# Patient Record
Sex: FEMALE | Race: WHITE | NOT HISPANIC OR LATINO | Employment: FULL TIME
[De-identification: names, ages, dates, MRNs, and addresses within clinical notes are randomized per-mention and may not be internally consistent; named-entity substitution may affect disease eponyms.]

---

## 2017-09-03 ENCOUNTER — HEALTH MAINTENANCE LETTER (OUTPATIENT)
Age: 32
End: 2017-09-03

## 2019-01-16 ENCOUNTER — COMMUNICATION - HEALTHEAST (OUTPATIENT)
Dept: SURGERY | Facility: CLINIC | Age: 34
End: 2019-01-16

## 2019-02-01 ENCOUNTER — AMBULATORY - HEALTHEAST (OUTPATIENT)
Dept: SURGERY | Facility: CLINIC | Age: 34
End: 2019-02-01

## 2019-02-04 ENCOUNTER — OFFICE VISIT - HEALTHEAST (OUTPATIENT)
Dept: SURGERY | Facility: CLINIC | Age: 34
End: 2019-02-04

## 2019-02-04 DIAGNOSIS — E66.01 OBESITY, CLASS III, BMI 40-49.9 (MORBID OBESITY) (H): ICD-10-CM

## 2019-02-04 ASSESSMENT — MIFFLIN-ST. JEOR: SCORE: 1969.45

## 2019-02-22 ENCOUNTER — AMBULATORY - HEALTHEAST (OUTPATIENT)
Dept: LAB | Facility: CLINIC | Age: 34
End: 2019-02-22

## 2019-02-22 ENCOUNTER — OFFICE VISIT - HEALTHEAST (OUTPATIENT)
Dept: SURGERY | Facility: CLINIC | Age: 34
End: 2019-02-22

## 2019-02-22 DIAGNOSIS — G89.29 OTHER CHRONIC PAIN: ICD-10-CM

## 2019-02-22 DIAGNOSIS — Z71.3 NUTRITIONAL COUNSELING: ICD-10-CM

## 2019-02-22 DIAGNOSIS — E66.01 OBESITY, CLASS III, BMI 40-49.9 (MORBID OBESITY) (H): ICD-10-CM

## 2019-02-22 DIAGNOSIS — E66.01 OBESITY, MORBID, BMI 40.0-49.9 (H): ICD-10-CM

## 2019-02-22 LAB
25(OH)D3 SERPL-MCNC: 34.7 NG/ML (ref 30–80)
ALBUMIN SERPL-MCNC: 3.9 G/DL (ref 3.5–5)
ALP SERPL-CCNC: 61 U/L (ref 45–120)
ALT SERPL W P-5'-P-CCNC: 12 U/L (ref 0–45)
ANION GAP SERPL CALCULATED.3IONS-SCNC: 7 MMOL/L (ref 5–18)
AST SERPL W P-5'-P-CCNC: 15 U/L (ref 0–40)
BILIRUB SERPL-MCNC: 0.6 MG/DL (ref 0–1)
BUN SERPL-MCNC: 11 MG/DL (ref 8–22)
CALCIUM SERPL-MCNC: 9.1 MG/DL (ref 8.5–10.5)
CHLORIDE BLD-SCNC: 107 MMOL/L (ref 98–107)
CO2 SERPL-SCNC: 22 MMOL/L (ref 22–31)
CREAT SERPL-MCNC: 0.79 MG/DL (ref 0.6–1.1)
ERYTHROCYTE [DISTWIDTH] IN BLOOD BY AUTOMATED COUNT: 12.6 % (ref 11–14.5)
FERRITIN SERPL-MCNC: 8 NG/ML (ref 10–130)
FOLATE SERPL-MCNC: 8.1 NG/ML
GFR SERPL CREATININE-BSD FRML MDRD: >60 ML/MIN/1.73M2
GLUCOSE BLD-MCNC: 89 MG/DL (ref 70–125)
HBA1C MFR BLD: 5.3 % (ref 4.2–6.1)
HCT VFR BLD AUTO: 38.3 % (ref 35–47)
HGB BLD-MCNC: 11.9 G/DL (ref 12–16)
MAGNESIUM SERPL-MCNC: 2.1 MG/DL (ref 1.8–2.6)
MCH RBC QN AUTO: 26.7 PG (ref 27–34)
MCHC RBC AUTO-ENTMCNC: 31.1 G/DL (ref 32–36)
MCV RBC AUTO: 86 FL (ref 80–100)
PLATELET # BLD AUTO: 310 THOU/UL (ref 140–440)
PMV BLD AUTO: 9.5 FL (ref 8.5–12.5)
POTASSIUM BLD-SCNC: 4.3 MMOL/L (ref 3.5–5)
PROT SERPL-MCNC: 6.9 G/DL (ref 6–8)
PTH-INTACT SERPL-MCNC: 60 PG/ML (ref 10–86)
RBC # BLD AUTO: 4.45 MILL/UL (ref 3.8–5.4)
SODIUM SERPL-SCNC: 136 MMOL/L (ref 136–145)
TSH SERPL DL<=0.005 MIU/L-ACNC: 2.94 UIU/ML (ref 0.3–5)
VIT B12 SERPL-MCNC: 330 PG/ML (ref 213–816)
WBC: 5.5 THOU/UL (ref 4–11)

## 2019-02-22 ASSESSMENT — MIFFLIN-ST. JEOR: SCORE: 1949.6

## 2019-02-24 LAB — VIT B1 PYROPHOSHATE BLD-SCNC: 81 NMOL/L (ref 70–180)

## 2019-02-25 LAB
ANNOTATION COMMENT IMP: NORMAL
VIT A SERPL-MCNC: 0.79 MG/L (ref 0.3–1.2)
VITAMIN A (RETINYL PALMITATE): 0.02 MG/L (ref 0–0.1)

## 2019-02-26 ENCOUNTER — COMMUNICATION - HEALTHEAST (OUTPATIENT)
Dept: SURGERY | Facility: CLINIC | Age: 34
End: 2019-02-26

## 2019-02-26 LAB
COPPER SERPL-MCNC: 97 UG/DL (ref 80–155)
ZINC SERPL-MCNC: 79 UG/DL (ref 60–120)

## 2019-02-28 ENCOUNTER — COMMUNICATION - HEALTHEAST (OUTPATIENT)
Dept: SURGERY | Facility: CLINIC | Age: 34
End: 2019-02-28

## 2019-02-28 ENCOUNTER — AMBULATORY - HEALTHEAST (OUTPATIENT)
Dept: SURGERY | Facility: CLINIC | Age: 34
End: 2019-02-28

## 2019-02-28 DIAGNOSIS — R79.0 LOW FERRITIN: ICD-10-CM

## 2019-03-05 ENCOUNTER — OFFICE VISIT - HEALTHEAST (OUTPATIENT)
Dept: SURGERY | Facility: CLINIC | Age: 34
End: 2019-03-05

## 2019-03-05 DIAGNOSIS — E66.01 MORBID OBESITY WITH BMI OF 40.0-44.9, ADULT (H): ICD-10-CM

## 2019-03-15 ENCOUNTER — OFFICE VISIT - HEALTHEAST (OUTPATIENT)
Dept: SURGERY | Facility: CLINIC | Age: 34
End: 2019-03-15

## 2019-03-15 DIAGNOSIS — E66.01 OBESITY, MORBID, BMI 40.0-49.9 (H): ICD-10-CM

## 2019-03-15 DIAGNOSIS — G89.29 OTHER CHRONIC PAIN: ICD-10-CM

## 2019-03-15 DIAGNOSIS — Z71.3 NUTRITIONAL COUNSELING: ICD-10-CM

## 2019-03-15 ASSESSMENT — MIFFLIN-ST. JEOR: SCORE: 1954.14

## 2019-03-20 ENCOUNTER — COMMUNICATION - HEALTHEAST (OUTPATIENT)
Dept: SURGERY | Facility: CLINIC | Age: 34
End: 2019-03-20

## 2019-03-21 ENCOUNTER — OFFICE VISIT - HEALTHEAST (OUTPATIENT)
Dept: SURGERY | Facility: CLINIC | Age: 34
End: 2019-03-21

## 2019-03-21 DIAGNOSIS — E66.01 MORBID OBESITY WITH BMI OF 40.0-44.9, ADULT (H): ICD-10-CM

## 2019-04-08 ENCOUNTER — OFFICE VISIT - HEALTHEAST (OUTPATIENT)
Dept: SURGERY | Facility: CLINIC | Age: 34
End: 2019-04-08

## 2019-04-08 DIAGNOSIS — E66.01 MORBID OBESITY WITH BMI OF 40.0-44.9, ADULT (H): ICD-10-CM

## 2019-04-08 ASSESSMENT — MIFFLIN-ST. JEOR: SCORE: 1972.28

## 2019-04-09 ENCOUNTER — AMBULATORY - HEALTHEAST (OUTPATIENT)
Dept: SURGERY | Facility: CLINIC | Age: 34
End: 2019-04-09

## 2019-04-15 ENCOUNTER — RECORDS - HEALTHEAST (OUTPATIENT)
Dept: ADMINISTRATIVE | Facility: OTHER | Age: 34
End: 2019-04-15

## 2019-04-22 ENCOUNTER — AMBULATORY - HEALTHEAST (OUTPATIENT)
Dept: SURGERY | Facility: CLINIC | Age: 34
End: 2019-04-22

## 2019-04-22 DIAGNOSIS — K21.9 ACID REFLUX: ICD-10-CM

## 2019-04-22 DIAGNOSIS — Z01.818 PRE-OPERATIVE CLEARANCE: ICD-10-CM

## 2019-04-22 DIAGNOSIS — Z87.891 HISTORY OF SMOKING: ICD-10-CM

## 2019-04-22 DIAGNOSIS — R63.4 RAPID WEIGHT LOSS: ICD-10-CM

## 2019-04-22 DIAGNOSIS — Z98.84 S/P LAPAROSCOPIC SLEEVE GASTRECTOMY: ICD-10-CM

## 2019-04-29 ENCOUNTER — RECORDS - HEALTHEAST (OUTPATIENT)
Dept: ADMINISTRATIVE | Facility: OTHER | Age: 34
End: 2019-04-29

## 2019-05-01 ENCOUNTER — COMMUNICATION - HEALTHEAST (OUTPATIENT)
Dept: SURGERY | Facility: CLINIC | Age: 34
End: 2019-05-01

## 2019-05-06 ENCOUNTER — ANESTHESIA - HEALTHEAST (OUTPATIENT)
Dept: SURGERY | Facility: CLINIC | Age: 34
End: 2019-05-06

## 2019-05-07 ENCOUNTER — SURGERY - HEALTHEAST (OUTPATIENT)
Dept: SURGERY | Facility: CLINIC | Age: 34
End: 2019-05-07

## 2019-05-07 ASSESSMENT — MIFFLIN-ST. JEOR
SCORE: 1943.71
SCORE: 1959.58

## 2019-05-10 ENCOUNTER — COMMUNICATION - HEALTHEAST (OUTPATIENT)
Dept: SURGERY | Facility: CLINIC | Age: 34
End: 2019-05-10

## 2019-05-10 DIAGNOSIS — K22.4 ESOPHAGEAL SPASM: ICD-10-CM

## 2019-05-10 DIAGNOSIS — R10.13 EPIGASTRIC CRAMPING: ICD-10-CM

## 2019-05-10 DIAGNOSIS — Z98.84 S/P LAPAROSCOPIC SLEEVE GASTRECTOMY: ICD-10-CM

## 2019-05-13 ENCOUNTER — AMBULATORY - HEALTHEAST (OUTPATIENT)
Dept: SURGERY | Facility: CLINIC | Age: 34
End: 2019-05-13

## 2019-05-13 ENCOUNTER — COMMUNICATION - HEALTHEAST (OUTPATIENT)
Dept: SURGERY | Facility: CLINIC | Age: 34
End: 2019-05-13

## 2019-05-13 DIAGNOSIS — E66.01 OBESITY, MORBID, BMI 40.0-49.9 (H): ICD-10-CM

## 2019-05-13 DIAGNOSIS — Z71.3 NUTRITIONAL COUNSELING: ICD-10-CM

## 2019-05-13 DIAGNOSIS — Z98.84 BARIATRIC SURGERY STATUS: ICD-10-CM

## 2019-05-20 ENCOUNTER — OFFICE VISIT - HEALTHEAST (OUTPATIENT)
Dept: SURGERY | Facility: CLINIC | Age: 34
End: 2019-05-20

## 2019-05-20 DIAGNOSIS — Z48.89 POSTOPERATIVE VISIT: ICD-10-CM

## 2019-05-20 ASSESSMENT — MIFFLIN-ST. JEOR: SCORE: 1881.56

## 2019-05-21 ENCOUNTER — COMMUNICATION - HEALTHEAST (OUTPATIENT)
Dept: FAMILY MEDICINE | Facility: CLINIC | Age: 34
End: 2019-05-21

## 2019-05-21 ENCOUNTER — OFFICE VISIT - HEALTHEAST (OUTPATIENT)
Dept: FAMILY MEDICINE | Facility: CLINIC | Age: 34
End: 2019-05-21

## 2019-05-21 ENCOUNTER — COMMUNICATION - HEALTHEAST (OUTPATIENT)
Dept: SURGERY | Facility: CLINIC | Age: 34
End: 2019-05-21

## 2019-05-21 DIAGNOSIS — D64.9 ANEMIA, UNSPECIFIED TYPE: ICD-10-CM

## 2019-05-21 DIAGNOSIS — L05.01 PILONIDAL ABSCESS: ICD-10-CM

## 2019-05-21 ASSESSMENT — MIFFLIN-ST. JEOR: SCORE: 1901.75

## 2019-05-22 ENCOUNTER — OFFICE VISIT - HEALTHEAST (OUTPATIENT)
Dept: SURGERY | Facility: CLINIC | Age: 34
End: 2019-05-22

## 2019-05-22 DIAGNOSIS — K61.1 PERI-RECTAL ABSCESS: ICD-10-CM

## 2019-05-22 ASSESSMENT — MIFFLIN-ST. JEOR: SCORE: 1874.54

## 2019-05-23 ENCOUNTER — COMMUNICATION - HEALTHEAST (OUTPATIENT)
Dept: SURGERY | Facility: CLINIC | Age: 34
End: 2019-05-23

## 2019-05-23 DIAGNOSIS — K61.1 PERI-RECTAL ABSCESS: ICD-10-CM

## 2019-05-29 ENCOUNTER — OFFICE VISIT - HEALTHEAST (OUTPATIENT)
Dept: SURGERY | Facility: CLINIC | Age: 34
End: 2019-05-29

## 2019-05-29 ASSESSMENT — MIFFLIN-ST. JEOR: SCORE: 1866.82

## 2019-06-04 ENCOUNTER — COMMUNICATION - HEALTHEAST (OUTPATIENT)
Dept: SURGERY | Facility: CLINIC | Age: 34
End: 2019-06-04

## 2019-06-07 ENCOUNTER — OFFICE VISIT - HEALTHEAST (OUTPATIENT)
Dept: SURGERY | Facility: CLINIC | Age: 34
End: 2019-06-07

## 2019-06-07 ENCOUNTER — COMMUNICATION - HEALTHEAST (OUTPATIENT)
Dept: SURGERY | Facility: CLINIC | Age: 34
End: 2019-06-07

## 2019-06-07 DIAGNOSIS — K61.1 PERIRECTAL ABSCESS: ICD-10-CM

## 2019-06-07 ASSESSMENT — MIFFLIN-ST. JEOR: SCORE: 1856.85

## 2019-06-12 ENCOUNTER — COMMUNICATION - HEALTHEAST (OUTPATIENT)
Dept: SURGERY | Facility: CLINIC | Age: 34
End: 2019-06-12

## 2019-06-17 ENCOUNTER — COMMUNICATION - HEALTHEAST (OUTPATIENT)
Dept: SURGERY | Facility: CLINIC | Age: 34
End: 2019-06-17

## 2019-06-25 ENCOUNTER — COMMUNICATION - HEALTHEAST (OUTPATIENT)
Dept: SURGERY | Facility: CLINIC | Age: 34
End: 2019-06-25

## 2019-06-25 DIAGNOSIS — K61.1 PERIRECTAL ABSCESS: ICD-10-CM

## 2019-06-27 ENCOUNTER — COMMUNICATION - HEALTHEAST (OUTPATIENT)
Dept: SURGERY | Facility: CLINIC | Age: 34
End: 2019-06-27

## 2019-06-28 ENCOUNTER — OFFICE VISIT - HEALTHEAST (OUTPATIENT)
Dept: SURGERY | Facility: CLINIC | Age: 34
End: 2019-06-28

## 2019-06-28 DIAGNOSIS — K61.1 PERIRECTAL ABSCESS: ICD-10-CM

## 2019-06-28 ASSESSMENT — MIFFLIN-ST. JEOR
SCORE: 1826
SCORE: 1826

## 2019-07-01 ENCOUNTER — ANESTHESIA - HEALTHEAST (OUTPATIENT)
Dept: SURGERY | Facility: AMBULATORY SURGERY CENTER | Age: 34
End: 2019-07-01

## 2019-07-02 ENCOUNTER — SURGERY - HEALTHEAST (OUTPATIENT)
Dept: SURGERY | Facility: AMBULATORY SURGERY CENTER | Age: 34
End: 2019-07-02

## 2019-07-02 ASSESSMENT — MIFFLIN-ST. JEOR: SCORE: 1821.46

## 2019-07-05 ENCOUNTER — COMMUNICATION - HEALTHEAST (OUTPATIENT)
Dept: SURGERY | Facility: CLINIC | Age: 34
End: 2019-07-05

## 2019-07-17 ENCOUNTER — OFFICE VISIT - HEALTHEAST (OUTPATIENT)
Dept: SURGERY | Facility: CLINIC | Age: 34
End: 2019-07-17

## 2019-07-17 DIAGNOSIS — K60.30 ANAL FISTULA: ICD-10-CM

## 2019-07-18 ENCOUNTER — AMBULATORY - HEALTHEAST (OUTPATIENT)
Dept: SURGERY | Facility: CLINIC | Age: 34
End: 2019-07-18

## 2019-07-18 DIAGNOSIS — E66.9 OBESITY WITH BODY MASS INDEX OF 30.0-39.9: ICD-10-CM

## 2019-07-18 DIAGNOSIS — Z98.84 BARIATRIC SURGERY STATUS: ICD-10-CM

## 2019-07-18 DIAGNOSIS — Z71.3 NUTRITIONAL COUNSELING: ICD-10-CM

## 2019-07-26 ENCOUNTER — COMMUNICATION - HEALTHEAST (OUTPATIENT)
Dept: SURGERY | Facility: CLINIC | Age: 34
End: 2019-07-26

## 2019-07-31 ENCOUNTER — COMMUNICATION - HEALTHEAST (OUTPATIENT)
Dept: SURGERY | Facility: CLINIC | Age: 34
End: 2019-07-31

## 2019-08-13 ENCOUNTER — OFFICE VISIT - HEALTHEAST (OUTPATIENT)
Dept: SURGERY | Facility: CLINIC | Age: 34
End: 2019-08-13

## 2019-08-13 DIAGNOSIS — Z98.84 BARIATRIC SURGERY STATUS: ICD-10-CM

## 2019-08-13 DIAGNOSIS — Z71.3 NUTRITIONAL COUNSELING: ICD-10-CM

## 2019-08-13 DIAGNOSIS — E66.9 OBESITY WITH BODY MASS INDEX OF 30.0-39.9: ICD-10-CM

## 2019-08-13 ASSESSMENT — MIFFLIN-ST. JEOR: SCORE: 1794.7

## 2019-08-27 ENCOUNTER — RECORDS - HEALTHEAST (OUTPATIENT)
Dept: ADMINISTRATIVE | Facility: OTHER | Age: 34
End: 2019-08-27

## 2019-09-17 ENCOUNTER — COMMUNICATION - HEALTHEAST (OUTPATIENT)
Dept: SURGERY | Facility: CLINIC | Age: 34
End: 2019-09-17

## 2019-09-17 ENCOUNTER — RECORDS - HEALTHEAST (OUTPATIENT)
Dept: ADMINISTRATIVE | Facility: OTHER | Age: 34
End: 2019-09-17

## 2019-10-24 ENCOUNTER — COMMUNICATION - HEALTHEAST (OUTPATIENT)
Dept: SURGERY | Facility: CLINIC | Age: 34
End: 2019-10-24

## 2019-10-24 DIAGNOSIS — Z48.89 POSTOPERATIVE VISIT: ICD-10-CM

## 2019-10-24 DIAGNOSIS — K91.2 POSTSURGICAL MALABSORPTION: ICD-10-CM

## 2019-10-24 DIAGNOSIS — K90.9 INTESTINAL MALABSORPTION, UNSPECIFIED: ICD-10-CM

## 2019-10-24 DIAGNOSIS — Z98.84 S/P LAPAROSCOPIC SLEEVE GASTRECTOMY: ICD-10-CM

## 2019-10-25 ENCOUNTER — COMMUNICATION - HEALTHEAST (OUTPATIENT)
Dept: SCHEDULING | Facility: CLINIC | Age: 34
End: 2019-10-25

## 2019-10-25 DIAGNOSIS — Z98.84 S/P LAPAROSCOPIC SLEEVE GASTRECTOMY: ICD-10-CM

## 2019-10-25 DIAGNOSIS — R63.4 RAPID WEIGHT LOSS: ICD-10-CM

## 2019-10-31 ENCOUNTER — SURGERY - HEALTHEAST (OUTPATIENT)
Dept: SURGERY | Facility: HOSPITAL | Age: 34
End: 2019-10-31

## 2019-10-31 ENCOUNTER — ANESTHESIA - HEALTHEAST (OUTPATIENT)
Dept: SURGERY | Facility: HOSPITAL | Age: 34
End: 2019-10-31

## 2019-11-18 ENCOUNTER — RECORDS - HEALTHEAST (OUTPATIENT)
Dept: ADMINISTRATIVE | Facility: OTHER | Age: 34
End: 2019-11-18

## 2019-11-19 ENCOUNTER — HOSPITAL ENCOUNTER (OUTPATIENT)
Dept: CT IMAGING | Facility: CLINIC | Age: 34
Discharge: HOME | End: 2019-11-19

## 2019-11-19 DIAGNOSIS — K61.1 PERIRECTAL ABSCESS: ICD-10-CM

## 2019-11-21 ENCOUNTER — AMBULATORY - HEALTHEAST (OUTPATIENT)
Dept: LAB | Facility: CLINIC | Age: 34
End: 2019-11-21

## 2019-11-21 DIAGNOSIS — K90.9 INTESTINAL MALABSORPTION, UNSPECIFIED: ICD-10-CM

## 2019-11-21 DIAGNOSIS — K91.2 POSTSURGICAL MALABSORPTION: ICD-10-CM

## 2019-11-21 DIAGNOSIS — Z98.84 S/P LAPAROSCOPIC SLEEVE GASTRECTOMY: ICD-10-CM

## 2019-11-21 LAB
25(OH)D3 SERPL-MCNC: 42.9 NG/ML (ref 30–80)
ALBUMIN SERPL-MCNC: 3.6 G/DL (ref 3.5–5)
ALP SERPL-CCNC: 54 U/L (ref 45–120)
ALT SERPL W P-5'-P-CCNC: 14 U/L (ref 0–45)
ANION GAP SERPL CALCULATED.3IONS-SCNC: 8 MMOL/L (ref 5–18)
AST SERPL W P-5'-P-CCNC: 21 U/L (ref 0–40)
BILIRUB SERPL-MCNC: 0.9 MG/DL (ref 0–1)
BUN SERPL-MCNC: 11 MG/DL (ref 8–22)
CALCIUM SERPL-MCNC: 8.8 MG/DL (ref 8.5–10.5)
CHLORIDE BLD-SCNC: 105 MMOL/L (ref 98–107)
CHOLEST SERPL-MCNC: 161 MG/DL
CO2 SERPL-SCNC: 24 MMOL/L (ref 22–31)
CREAT SERPL-MCNC: 0.71 MG/DL (ref 0.6–1.1)
ERYTHROCYTE [DISTWIDTH] IN BLOOD BY AUTOMATED COUNT: 12.1 % (ref 11–14.5)
FASTING STATUS PATIENT QL REPORTED: YES
FERRITIN SERPL-MCNC: 30 NG/ML (ref 10–130)
FOLATE SERPL-MCNC: 14.5 NG/ML
GFR SERPL CREATININE-BSD FRML MDRD: >60 ML/MIN/1.73M2
GLUCOSE BLD-MCNC: 81 MG/DL (ref 70–125)
HCT VFR BLD AUTO: 43.1 % (ref 35–47)
HDLC SERPL-MCNC: 59 MG/DL
HGB BLD-MCNC: 14.3 G/DL (ref 12–16)
LDLC SERPL CALC-MCNC: 88 MG/DL
MCH RBC QN AUTO: 30.4 PG (ref 27–34)
MCHC RBC AUTO-ENTMCNC: 33.2 G/DL (ref 32–36)
MCV RBC AUTO: 92 FL (ref 80–100)
PLATELET # BLD AUTO: 294 THOU/UL (ref 140–440)
PMV BLD AUTO: 9.3 FL (ref 8.5–12.5)
POTASSIUM BLD-SCNC: 4.6 MMOL/L (ref 3.5–5)
PROT SERPL-MCNC: 6.5 G/DL (ref 6–8)
PTH-INTACT SERPL-MCNC: 42 PG/ML (ref 10–86)
RBC # BLD AUTO: 4.7 MILL/UL (ref 3.8–5.4)
SODIUM SERPL-SCNC: 137 MMOL/L (ref 136–145)
TRIGL SERPL-MCNC: 68 MG/DL
WBC: 5 THOU/UL (ref 4–11)

## 2019-11-22 LAB — ZINC SERPL-MCNC: 72.2 UG/DL (ref 60–120)

## 2019-11-24 LAB — VIT B1 PYROPHOSHATE BLD-SCNC: 156 NMOL/L (ref 70–180)

## 2019-11-26 ENCOUNTER — COMMUNICATION - HEALTHEAST (OUTPATIENT)
Dept: SURGERY | Facility: CLINIC | Age: 34
End: 2019-11-26

## 2019-11-27 ENCOUNTER — OFFICE VISIT - HEALTHEAST (OUTPATIENT)
Dept: SURGERY | Facility: CLINIC | Age: 34
End: 2019-11-27

## 2019-11-27 DIAGNOSIS — E66.811 OBESITY, CLASS I, BMI 30-34.9: ICD-10-CM

## 2019-11-27 DIAGNOSIS — K91.2 POSTOPERATIVE MALABSORPTION: ICD-10-CM

## 2019-11-27 DIAGNOSIS — Z90.3 HISTORY OF SLEEVE GASTRECTOMY: ICD-10-CM

## 2019-11-27 ASSESSMENT — MIFFLIN-ST. JEOR: SCORE: 1724.4

## 2019-12-02 ENCOUNTER — AMBULATORY - HEALTHEAST (OUTPATIENT)
Dept: SURGERY | Facility: CLINIC | Age: 34
End: 2019-12-02

## 2019-12-02 DIAGNOSIS — K91.2 POSTOPERATIVE MALABSORPTION: ICD-10-CM

## 2019-12-06 ENCOUNTER — RECORDS - HEALTHEAST (OUTPATIENT)
Dept: ADMINISTRATIVE | Facility: OTHER | Age: 34
End: 2019-12-06

## 2019-12-19 ENCOUNTER — HOSPITAL ENCOUNTER (OUTPATIENT)
Dept: MRI IMAGING | Facility: CLINIC | Age: 34
Discharge: HOME | End: 2019-12-19
Attending: COLON & RECTAL SURGERY

## 2019-12-19 DIAGNOSIS — K60.30 ANAL FISTULA: ICD-10-CM

## 2019-12-20 ASSESSMENT — MIFFLIN-ST. JEOR: SCORE: 1721.67

## 2019-12-26 ENCOUNTER — ANESTHESIA - HEALTHEAST (OUTPATIENT)
Dept: SURGERY | Facility: AMBULATORY SURGERY CENTER | Age: 34
End: 2019-12-26

## 2019-12-27 ENCOUNTER — SURGERY - HEALTHEAST (OUTPATIENT)
Dept: SURGERY | Facility: AMBULATORY SURGERY CENTER | Age: 34
End: 2019-12-27

## 2019-12-27 ASSESSMENT — MIFFLIN-ST. JEOR: SCORE: 1721.67

## 2020-01-23 ENCOUNTER — COMMUNICATION - HEALTHEAST (OUTPATIENT)
Dept: SURGERY | Facility: CLINIC | Age: 35
End: 2020-01-23

## 2020-01-28 ENCOUNTER — OFFICE VISIT - HEALTHEAST (OUTPATIENT)
Dept: SURGERY | Facility: CLINIC | Age: 35
End: 2020-01-28

## 2020-01-28 DIAGNOSIS — Z98.84 BARIATRIC SURGERY STATUS: ICD-10-CM

## 2020-01-28 DIAGNOSIS — K91.2 POSTOPERATIVE MALABSORPTION: ICD-10-CM

## 2020-01-28 DIAGNOSIS — E66.811 OBESITY, CLASS I, BMI 30-34.9: ICD-10-CM

## 2020-01-28 ASSESSMENT — MIFFLIN-ST. JEOR: SCORE: 1702.17

## 2020-02-12 ENCOUNTER — AMBULATORY - HEALTHEAST (OUTPATIENT)
Dept: MULTI SPECIALTY CLINIC | Facility: CLINIC | Age: 35
End: 2020-02-12

## 2020-02-12 LAB
HPV_EXT - HISTORICAL: NORMAL
PAP SMEAR - HIM PATIENT REPORTED: ABNORMAL

## 2020-03-01 ENCOUNTER — COMMUNICATION - HEALTHEAST (OUTPATIENT)
Dept: SURGERY | Facility: CLINIC | Age: 35
End: 2020-03-01

## 2020-03-02 ENCOUNTER — AMBULATORY - HEALTHEAST (OUTPATIENT)
Dept: SURGERY | Facility: CLINIC | Age: 35
End: 2020-03-02

## 2020-03-02 DIAGNOSIS — E66.09 OBESITY DUE TO EXCESS CALORIES, UNSPECIFIED CLASSIFICATION, UNSPECIFIED WHETHER SERIOUS COMORBIDITY PRESENT: ICD-10-CM

## 2020-03-03 ENCOUNTER — COMMUNICATION - HEALTHEAST (OUTPATIENT)
Dept: SURGERY | Facility: CLINIC | Age: 35
End: 2020-03-03

## 2020-05-06 ENCOUNTER — OFFICE VISIT - HEALTHEAST (OUTPATIENT)
Dept: SURGERY | Facility: CLINIC | Age: 35
End: 2020-05-06

## 2020-05-06 DIAGNOSIS — K91.2 POSTOPERATIVE MALABSORPTION: ICD-10-CM

## 2020-05-06 DIAGNOSIS — Z90.3 HISTORY OF SLEEVE GASTRECTOMY: ICD-10-CM

## 2020-05-06 ASSESSMENT — PATIENT HEALTH QUESTIONNAIRE - PHQ9: SUM OF ALL RESPONSES TO PHQ QUESTIONS 1-9: 2

## 2020-05-15 ENCOUNTER — AMBULATORY - HEALTHEAST (OUTPATIENT)
Dept: LAB | Facility: CLINIC | Age: 35
End: 2020-05-15

## 2020-05-15 ENCOUNTER — COMMUNICATION - HEALTHEAST (OUTPATIENT)
Dept: SCHEDULING | Facility: CLINIC | Age: 35
End: 2020-05-15

## 2020-05-15 DIAGNOSIS — K91.2 POSTOPERATIVE MALABSORPTION: ICD-10-CM

## 2020-05-15 LAB
ALBUMIN SERPL-MCNC: 4 G/DL (ref 3.5–5)
ALP SERPL-CCNC: 75 U/L (ref 45–120)
ALT SERPL W P-5'-P-CCNC: 18 U/L (ref 0–45)
ANION GAP SERPL CALCULATED.3IONS-SCNC: 11 MMOL/L (ref 5–18)
AST SERPL W P-5'-P-CCNC: 21 U/L (ref 0–40)
BILIRUB SERPL-MCNC: 1.4 MG/DL (ref 0–1)
BUN SERPL-MCNC: 10 MG/DL (ref 8–22)
CALCIUM SERPL-MCNC: 9.6 MG/DL (ref 8.5–10.5)
CHLORIDE BLD-SCNC: 100 MMOL/L (ref 98–107)
CHOLEST SERPL-MCNC: 184 MG/DL
CO2 SERPL-SCNC: 26 MMOL/L (ref 22–31)
CREAT SERPL-MCNC: 0.81 MG/DL (ref 0.6–1.1)
ERYTHROCYTE [DISTWIDTH] IN BLOOD BY AUTOMATED COUNT: 10.7 % (ref 11–14.5)
FASTING STATUS PATIENT QL REPORTED: NORMAL
FERRITIN SERPL-MCNC: 37 NG/ML (ref 10–130)
FOLATE SERPL-MCNC: 9.5 NG/ML
GFR SERPL CREATININE-BSD FRML MDRD: >60 ML/MIN/1.73M2
GLUCOSE BLD-MCNC: 86 MG/DL (ref 70–125)
HCT VFR BLD AUTO: 45.7 % (ref 35–47)
HDLC SERPL-MCNC: 109 MG/DL
HGB BLD-MCNC: 15.5 G/DL (ref 12–16)
LDLC SERPL CALC-MCNC: 60 MG/DL
MAGNESIUM SERPL-MCNC: 1.9 MG/DL (ref 1.8–2.6)
MCH RBC QN AUTO: 33.1 PG (ref 27–34)
MCHC RBC AUTO-ENTMCNC: 33.8 G/DL (ref 32–36)
MCV RBC AUTO: 98 FL (ref 80–100)
PLATELET # BLD AUTO: 294 THOU/UL (ref 140–440)
PMV BLD AUTO: 7 FL (ref 7–10)
POTASSIUM BLD-SCNC: 4.1 MMOL/L (ref 3.5–5)
PROT SERPL-MCNC: 7.1 G/DL (ref 6–8)
PTH-INTACT SERPL-MCNC: 58 PG/ML (ref 10–86)
RBC # BLD AUTO: 4.68 MILL/UL (ref 3.8–5.4)
SODIUM SERPL-SCNC: 137 MMOL/L (ref 136–145)
TRIGL SERPL-MCNC: 73 MG/DL
TSH SERPL DL<=0.005 MIU/L-ACNC: 2.59 UIU/ML (ref 0.3–5)
VIT B12 SERPL-MCNC: 498 PG/ML (ref 213–816)
WBC: 6.3 THOU/UL (ref 4–11)

## 2020-05-17 LAB
COPPER SERPL-MCNC: 121.3 UG/DL (ref 80–155)
ZINC SERPL-MCNC: 97.9 UG/DL (ref 60–120)

## 2020-05-18 LAB
ANNOTATION COMMENT IMP: NORMAL
VIT A SERPL-MCNC: 0.95 MG/L (ref 0.3–1.2)
VIT B1 PYROPHOSHATE BLD-SCNC: 139 NMOL/L (ref 70–180)
VITAMIN A (RETINYL PALMITATE): 0.03 MG/L (ref 0–0.1)

## 2020-05-19 LAB — 25(OH)D3 SERPL-MCNC: 37.5 NG/ML (ref 30–80)

## 2020-05-20 ENCOUNTER — COMMUNICATION - HEALTHEAST (OUTPATIENT)
Dept: SURGERY | Facility: CLINIC | Age: 35
End: 2020-05-20

## 2020-05-29 ENCOUNTER — COMMUNICATION - HEALTHEAST (OUTPATIENT)
Dept: SURGERY | Facility: CLINIC | Age: 35
End: 2020-05-29

## 2020-06-01 ENCOUNTER — AMBULATORY - HEALTHEAST (OUTPATIENT)
Dept: SURGERY | Facility: CLINIC | Age: 35
End: 2020-06-01

## 2020-06-01 DIAGNOSIS — E66.09 OBESITY DUE TO EXCESS CALORIES, UNSPECIFIED CLASSIFICATION, UNSPECIFIED WHETHER SERIOUS COMORBIDITY PRESENT: ICD-10-CM

## 2020-06-03 ENCOUNTER — AMBULATORY - HEALTHEAST (OUTPATIENT)
Dept: SURGERY | Facility: CLINIC | Age: 35
End: 2020-06-03

## 2020-06-03 DIAGNOSIS — E66.09 OBESITY DUE TO EXCESS CALORIES, UNSPECIFIED CLASSIFICATION, UNSPECIFIED WHETHER SERIOUS COMORBIDITY PRESENT: ICD-10-CM

## 2020-06-26 ENCOUNTER — COMMUNICATION - HEALTHEAST (OUTPATIENT)
Dept: SURGERY | Facility: CLINIC | Age: 35
End: 2020-06-26

## 2020-08-17 ENCOUNTER — COMMUNICATION - HEALTHEAST (OUTPATIENT)
Dept: SURGERY | Facility: CLINIC | Age: 35
End: 2020-08-17

## 2020-08-18 ENCOUNTER — COMMUNICATION - HEALTHEAST (OUTPATIENT)
Dept: SURGERY | Facility: CLINIC | Age: 35
End: 2020-08-18

## 2020-08-18 ENCOUNTER — AMBULATORY - HEALTHEAST (OUTPATIENT)
Dept: SURGERY | Facility: CLINIC | Age: 35
End: 2020-08-18

## 2020-08-18 DIAGNOSIS — Z90.3 HISTORY OF SLEEVE GASTRECTOMY: ICD-10-CM

## 2020-08-18 DIAGNOSIS — F19.90 EXCESSIVE USE OF NONSTEROIDAL ANTI-INFLAMMATORY DRUG (NSAID): ICD-10-CM

## 2020-09-08 ENCOUNTER — AMBULATORY - HEALTHEAST (OUTPATIENT)
Dept: SURGERY | Facility: CLINIC | Age: 35
End: 2020-09-08

## 2020-09-08 DIAGNOSIS — F19.90 EXCESSIVE USE OF NONSTEROIDAL ANTI-INFLAMMATORY DRUG (NSAID): ICD-10-CM

## 2020-09-08 DIAGNOSIS — Z90.3 HISTORY OF SLEEVE GASTRECTOMY: ICD-10-CM

## 2020-11-10 ENCOUNTER — COMMUNICATION - HEALTHEAST (OUTPATIENT)
Dept: SURGERY | Facility: CLINIC | Age: 35
End: 2020-11-10

## 2020-11-10 DIAGNOSIS — Z90.3 HISTORY OF SLEEVE GASTRECTOMY: ICD-10-CM

## 2020-11-10 DIAGNOSIS — F19.90 EXCESSIVE USE OF NONSTEROIDAL ANTI-INFLAMMATORY DRUG (NSAID): ICD-10-CM

## 2020-11-30 ENCOUNTER — COMMUNICATION - HEALTHEAST (OUTPATIENT)
Dept: SURGERY | Facility: CLINIC | Age: 35
End: 2020-11-30

## 2020-11-30 DIAGNOSIS — E66.09 OBESITY DUE TO EXCESS CALORIES, UNSPECIFIED CLASSIFICATION, UNSPECIFIED WHETHER SERIOUS COMORBIDITY PRESENT: ICD-10-CM

## 2020-12-02 ENCOUNTER — COMMUNICATION - HEALTHEAST (OUTPATIENT)
Dept: SURGERY | Facility: CLINIC | Age: 35
End: 2020-12-02

## 2020-12-07 ENCOUNTER — COMMUNICATION - HEALTHEAST (OUTPATIENT)
Dept: SURGERY | Facility: CLINIC | Age: 35
End: 2020-12-07

## 2020-12-07 DIAGNOSIS — Z90.3 HISTORY OF SLEEVE GASTRECTOMY: ICD-10-CM

## 2020-12-07 DIAGNOSIS — K91.2 POSTOPERATIVE MALABSORPTION: ICD-10-CM

## 2020-12-15 ENCOUNTER — AMBULATORY - HEALTHEAST (OUTPATIENT)
Dept: LAB | Facility: CLINIC | Age: 35
End: 2020-12-15

## 2020-12-15 DIAGNOSIS — Z90.3 HISTORY OF SLEEVE GASTRECTOMY: ICD-10-CM

## 2020-12-15 DIAGNOSIS — K91.2 POSTOPERATIVE MALABSORPTION: ICD-10-CM

## 2020-12-15 LAB
ALBUMIN SERPL-MCNC: 3.9 G/DL (ref 3.5–5)
ALP SERPL-CCNC: 63 U/L (ref 45–120)
ALT SERPL W P-5'-P-CCNC: 15 U/L (ref 0–45)
ANION GAP SERPL CALCULATED.3IONS-SCNC: 9 MMOL/L (ref 5–18)
AST SERPL W P-5'-P-CCNC: 18 U/L (ref 0–40)
BILIRUB SERPL-MCNC: 1.3 MG/DL (ref 0–1)
BUN SERPL-MCNC: 13 MG/DL (ref 8–22)
CALCIUM SERPL-MCNC: 9 MG/DL (ref 8.5–10.5)
CHLORIDE BLD-SCNC: 105 MMOL/L (ref 98–107)
CO2 SERPL-SCNC: 24 MMOL/L (ref 22–31)
CREAT SERPL-MCNC: 0.74 MG/DL (ref 0.6–1.1)
ERYTHROCYTE [DISTWIDTH] IN BLOOD BY AUTOMATED COUNT: 10.5 % (ref 11–14.5)
FERRITIN SERPL-MCNC: 112 NG/ML (ref 10–130)
GFR SERPL CREATININE-BSD FRML MDRD: >60 ML/MIN/1.73M2
GLUCOSE BLD-MCNC: 70 MG/DL (ref 70–125)
HCT VFR BLD AUTO: 41.4 % (ref 35–47)
HGB BLD-MCNC: 14.1 G/DL (ref 12–16)
MCH RBC QN AUTO: 32.6 PG (ref 27–34)
MCHC RBC AUTO-ENTMCNC: 34 G/DL (ref 32–36)
MCV RBC AUTO: 96 FL (ref 80–100)
PLATELET # BLD AUTO: 225 THOU/UL (ref 140–440)
PMV BLD AUTO: 7.3 FL (ref 7–10)
POTASSIUM BLD-SCNC: 4.4 MMOL/L (ref 3.5–5)
PROT SERPL-MCNC: 6.7 G/DL (ref 6–8)
RBC # BLD AUTO: 4.33 MILL/UL (ref 3.8–5.4)
SODIUM SERPL-SCNC: 138 MMOL/L (ref 136–145)
VIT B12 SERPL-MCNC: 841 PG/ML (ref 213–816)
WBC: 5.2 THOU/UL (ref 4–11)

## 2020-12-16 LAB — 25(OH)D3 SERPL-MCNC: 54.3 NG/ML (ref 30–80)

## 2020-12-18 ENCOUNTER — COMMUNICATION - HEALTHEAST (OUTPATIENT)
Dept: SURGERY | Facility: CLINIC | Age: 35
End: 2020-12-18

## 2020-12-18 LAB — VIT B1 PYROPHOSHATE BLD-SCNC: 154 NMOL/L (ref 70–180)

## 2020-12-23 ENCOUNTER — OFFICE VISIT - HEALTHEAST (OUTPATIENT)
Dept: SURGERY | Facility: CLINIC | Age: 35
End: 2020-12-23

## 2020-12-23 DIAGNOSIS — M54.2 NECK PAIN: ICD-10-CM

## 2020-12-23 DIAGNOSIS — E66.09 OBESITY DUE TO EXCESS CALORIES, UNSPECIFIED CLASSIFICATION, UNSPECIFIED WHETHER SERIOUS COMORBIDITY PRESENT: ICD-10-CM

## 2020-12-23 ASSESSMENT — MIFFLIN-ST. JEOR: SCORE: 1581.06

## 2020-12-29 ENCOUNTER — COMMUNICATION - HEALTHEAST (OUTPATIENT)
Dept: SURGERY | Facility: CLINIC | Age: 35
End: 2020-12-29

## 2021-01-15 ENCOUNTER — HEALTH MAINTENANCE LETTER (OUTPATIENT)
Age: 36
End: 2021-01-15

## 2021-02-19 ENCOUNTER — COMMUNICATION - HEALTHEAST (OUTPATIENT)
Dept: SURGERY | Facility: CLINIC | Age: 36
End: 2021-02-19

## 2021-02-19 DIAGNOSIS — F19.90 EXCESSIVE USE OF NONSTEROIDAL ANTI-INFLAMMATORY DRUG (NSAID): ICD-10-CM

## 2021-02-19 DIAGNOSIS — Z90.3 HISTORY OF SLEEVE GASTRECTOMY: ICD-10-CM

## 2021-03-26 ENCOUNTER — AMBULATORY - HEALTHEAST (OUTPATIENT)
Dept: NURSING | Facility: CLINIC | Age: 36
End: 2021-03-26

## 2021-04-16 ENCOUNTER — AMBULATORY - HEALTHEAST (OUTPATIENT)
Dept: NURSING | Facility: CLINIC | Age: 36
End: 2021-04-16

## 2021-05-10 ENCOUNTER — COMMUNICATION - HEALTHEAST (OUTPATIENT)
Dept: SURGERY | Facility: CLINIC | Age: 36
End: 2021-05-10

## 2021-05-10 DIAGNOSIS — F19.90 EXCESSIVE USE OF NONSTEROIDAL ANTI-INFLAMMATORY DRUG (NSAID): ICD-10-CM

## 2021-05-10 DIAGNOSIS — Z90.3 HISTORY OF SLEEVE GASTRECTOMY: ICD-10-CM

## 2021-05-27 ASSESSMENT — PATIENT HEALTH QUESTIONNAIRE - PHQ9: SUM OF ALL RESPONSES TO PHQ QUESTIONS 1-9: 2

## 2021-05-27 NOTE — PROGRESS NOTES
I have submitted a prior authorization request on behalf of this patient to [facility] to be approved for a LSG with Dr. Austyn Louis

## 2021-05-27 NOTE — PROGRESS NOTES
HPI: Donna Bolanos is a 34 y.o. female here today for consideration of metabolic and bariatric surgery. She is referred by Dr. Hummel.  She has struggled with obesity for most of her life, with accelerating weight through her late 20s.  She has made multiple weight loss attempts on her own accord over the years, including low carbohydrate diets, caloric restrictive diets, and exercise.  She has had varying success with these, losing up to 50 pounds, but unfortunately always gained the weight back.  She began working with her primary physician last March on phentermine, but has plateaued on her weight loss.  She is currently down from her highest weight of 291 pounds.  She would like to pursue bariatric surgery to improve her overall health.    Her bariatric co-morbidities include chronic neck and upper back pain.    Bariatric comorbidities not present include hypertension, type 2 diabetes, obstructive sleep apnea, PCO S, hyperlipidemia.      Allergies:Patient has no known allergies.    No past medical history on file.  No active medical issues other than obesity    No past surgical history on file.  No previous surgeries    CURRENT MEDS:  Current Outpatient Medications   Medication Sig Dispense Refill     cholecalciferol, vitamin D3, (CHOLECALCIFEROL) 1,000 unit tablet Take 1 tablet by mouth daily.       ferrous gluconate (FERGON) 324 MG tablet Take 1 tablet (324 mg total) by mouth daily with breakfast. 45 tablet 0     levonorgestrel (MIRENA) 20 mcg/24 hr (5 years) IUD 1 each by Intrauterine route.       mv-min-FA-Cq-Wv-izesekc-lutein (MULTIVITAL) 0.4-162-18 mg Tab Take 1 tablet by mouth daily.       phentermine 15 MG capsule Take 15 mg by mouth daily.       topiramate (TOPAMAX) 25 MG tablet Take 75 mg by mouth. TAKE 3 TABLETS DAILY FOR A WEEK, THEN 2 TABLETS DAILY FOR A WEEK, THEN 1 TABLET DAILY FOR A WEEK, THEN STOP             No current facility-administered medications for this visit.          Family  "History   Problem Relation Age of Onset     Scoliosis Mother      Depression Sister      Liver disease Brother      No Medical Problems Father      No Medical Problems Sister      No Medical Problems Sister         reports that she quit smoking about 9 years ago. She has never used smokeless tobacco. She reports that she drinks alcohol. She reports that she does not use drugs.    Review of Systems -  A complete ROS was reviewed and except for what is listed in the HPI above, all others are negative  PSYCHIATRIC: She has undergone a lifestyle assessment and has been deemed a good candidate for bariatric surgery by the psychologist.    /80 (Patient Site: Right Arm, Patient Position: Sitting, Cuff Size: Adult Large)   Pulse 88   Ht 5' 8\" (1.727 m)   Wt (!) 272 lb (123.4 kg)   SpO2 100%   Breastfeeding? No   BMI 41.36 kg/m    Wt Readings from Last 3 Encounters:   04/08/19 (!) 272 lb (123.4 kg)   03/21/19 (!) 266 lb (120.7 kg)   03/15/19 (!) 268 lb (121.6 kg)     Body mass index is 41.36 kg/m .    EXAM:  GENERAL: This is a well-developed 34 y.o. female who appears her stated age  HEAD & NECK: Grossly normal.  No visible goiter or scars  CARDIAC: RRR without murmur  CHEST/LUNG:  Clear to auscultation  ABDOMEN: Obese.  Nontender.  No hernias or masses appreciated.  LYMPHATIC:  No significant adenopathy appreciated.    EXTREMITIES: Grossly normal.  No evidence of chronic venous stasis.    NEUROLOGIC: Focally intact  INTEGUMENT: No open lesions or ulcers  PSYCHIATRIC: Normal affect. She has a good grasp on the nature of her obesity and the treatment options.    LABS:  Lab Results   Component Value Date    WBC 5.5 02/22/2019    HGB 11.9 (L) 02/22/2019    HCT 38.3 02/22/2019    MCV 86 02/22/2019     02/22/2019     INR/Prothrombin Time      Lab Results   Component Value Date    HGBA1C 5.3 02/22/2019     Lab Results   Component Value Date    ALT 12 02/22/2019    AST 15 02/22/2019    ALKPHOS 61 02/22/2019    " BILITOT 0.6 02/22/2019       Assessment/Plan: 34 y.o. female who is an excellent candidate for bariatric and metabolic surgery.  After a careful conversation with the patient it was decided that a lap scopic sleeve gastrectomy would be her best option given her limited medical issues in young age.       I went over the surgery in detail with her.  I went over the nature of the operation and some of the potential consequences of the surgery.  I went over the expected hospital course and discussed laparoscopic versus open surgery, understanding that we will plan on doing this laparoscopically with the possibility of having to convert to an open operation.  I went over some of the risks and complications of the operation including, but not limited to, DVT, pulmonary emboli, pneumonia, postoperative bleeding, wound infection, staple line leak, intra-abdominal sepsis, and possible death.  I also went over some of the potential nutritional concerns such as vitamin B-12, iron, vitamin D, vitamin A, calcium and protein deficiencies.  I will also went over the need for lifelong nutritional surveillance.  The patient understands and wants to proceed with surgery.  We will submit for prior authorization.      Austyn Louis MD  [facility] Department of Surgery

## 2021-05-28 NOTE — PATIENT INSTRUCTIONS - HE
Your surgery is scheduled for 5/7/2019 at 11:30 am.  Please arrive at 9:30 am.    Medication Recommendations    Acetaminophen (Brand Name: Tylenol or MPAP)   You will likely be sent home on Tylenol to go with your other pain medications after surgery.  It is ok to cut/crush regular or extra strength tablets.  Make sure tablet is not long acting or extended release.  Do not take more than 3000mg in 24 hours.  If you decide to use liquid Tylenol at home, we recommend you use Adult strength because you will have to take less liquid to get the same effect.  Dilute the medication 1:1 with water before taking the liquid version to prevent dumping or stomach upset.    Levonorgestrel (Mirena IUD)  Okay to leave in and continue use.     Multivitamin with Iron   If not already taking, start chewable MVI with at least 18mg of iron and 10-15 mg of zinc twice a day at least 2 weeks prior to surgery and resume the day after surgery for life. Do not take the morning of surgery.    Omeprazole (Brand Name: Prilosec)   If not already taking, you will get a prescription to start when you get home from the hospital.  Tablets cannot be cut or crushed.  If a capsule, entire capsule contents may be sprinkled on a spoonful of applesauce and taken immediately without chewing.  If only on a full liquid diet, dump capsule contents into a spoonful of liquid and take without chewing.  May swallow whole again starting 6 weeks after surgery but can try swallowing sooner if having issues with opening into food.  Continue after surgery for at least 3 months.    Phentermine (Brand Name: Adipex)   Stop taking 2 weeks before surgery.  Talk with your Bariatrician prior to restarting this medication after surgery.    Topiramate (Brand Name: Topamax)  Tablet is not recommended to break, crush or chew because of the bitter taste.  However, you may need to cut tablet and take with flavored water or juice to mask the bitter taste.  If you have capsules, you  can open the capsule and pour it into a teaspoonful of soft food (yogurt or applesauce).  Swallow all of the food without chewing, and follow with a drink of water.  Stop taking after surgery if using for appetite suppression.    Ursodiol (Brand name: Actigall)  Start two weeks after surgery.  Swallow whole with warm water, do not cut, crush, or open capsule up.  This is a medication that will help to prevent gallstones from forming during the first 6 months post-op of rapid weight loss.  Prescription was sent to your pharmacy.    Vitamin B12   If not already taking, start sublingual (under the tongue) vitamin B12 1,000 mcg at least 2 weeks prior to surgery and resume the day after surgery for life. Do not take the morning of surgery.    Vitamin D3 5000 IU   Ok to cut or crush tablet; if a softgel will likely need to swallow whole if small enough.  If capsule is too large, may need to take tablet form until able to swallow larger pills again.  Do not take the morning of surgery and don t restart again until instructed by the dietitian.      [facility] Surgery   Laparoscopic Mehran-en-Y Gastric Bypass, Gastric Sleeve & Single Anastomosis Duodenal Switch  Home Discharge Instructions      Coughing and Deep Breathing   Use your incentive spirometer frequently after you get home. Continued coughing and deep breathing help prevent complications such as fevers and pneumonia. If you are fever free after one week, stop using it, and discard it.    Incision and Dressing   All incisional coverings can get wet so you may continue to shower at home.  If you have Band-Aids, they should come off while in the hospital.  Remove all steri-strips one week from your surgery date unless told otherwise by the surgeon.  If you have gauze covered by a clear dressing, remove 2-3 days after surgery as directed by the surgeon.     Notify the clinic or your surgeon if:  You develop a fever orally of 101.5 or greater, see any unusual bright red  or green infection-like drainage; see redness or swelling around the incision; have left shoulder pain or pain that does not go away with your narcotic; increasing anxiety or feeling that something is just  not right;  a persistent rapid heart rate (greater than or equal to 120 beats per minute) lasting longer than 15 minutes; progressive rapid breathing; increasing shortness of breath; continuous (non-stop) hiccups lasting longer than 15 minutes; persistent nausea; if you are unable to keep liquids down; have frothy vomiting; difficulty swallowing; excessive bloating; swelling, warmth, discoloration or pain in your lower legs; dark, bright red, black, or tarry bowel movements; or anything you are concerned might be an urgent problem or need reassurance about.    Dehydration/Nausea/Vomiting  Vomiting is not normal after surgery and can be caused by drinking with meals, eating large portions, not chewing thoroughly and drinking large sips.  If you continue to have nausea and vomiting despite following our recommendations, call the clinic.  Nausea can be caused by dehydration so make sure you are drinking the suggested amount of fluids.  Symptoms of dehydration include dark colored urine, lack of energy, nausea, dizziness, headache and a bad taste in your mouth.  If you notice any of these symptoms, please don t delay in calling the clinic.  Early identification gives us more options for treatment.    Bowel Movements  Consider that your stools might be loose since you are currently only taking in fluids. Diarrhea may be caused by dumping syndrome if you had Gastric Bypass, so make sure you aren t drinking liquids containing excess sugar.  Otherwise, call the clinic if you have 3 or more diarrhea stools per day. If constipation is a problem, it is ok to use a Dulcolax  rectal suppository, Miralax or Milk of Magnesia . Other helpful ways to avoid constipation include: increasing your fluids; stop taking narcotic  medications; and increasing your activity. Adding Benefiber  daily to your liquids once you have had a stool may help keep you regular until there is more natural fiber in your diet. You may also have foul smelling gas.    Risk for Blood Clots  For the next 6 weeks, if you are in any vehicle longer than 30 minutes, stop every 30 minutes, and walk for at least 3 minutes before continuing your journey. Traveling and/or flying are not recommended for 6 weeks.  If leaving the country within the first 3-6 months after surgery, check with your surgeon first.    Activity  Do not lift greater than 20 lbs. for 2 weeks unless told differently by your surgeon. After two weeks, let your body be your guide. You may drive only after you have been completely off of narcotics for a minimum of 24 hours. Continue to shower as you have in the hospital. NO BATHING IN THE BATHTUB, SWIMMING, etc. for 2-4 weeks.  (You need to be sure your incisions are well healed to prevent infection.)    Pain Control  You will go home with a prescription for pain medication. If your pain does not go away with this medication, please notify your surgeon. If the pain requires medication, but not something as strong as the prescription, you may try Tylenol  (acetaminophen) cut  <    inch or crushed.   DO NOT USE PRESCRIBED OR OVER THE COUNTER NONSTEROIDAL ANTI-  INFLAMMATORY MEDICATION LIKE MOTRIN, ADVIL, IBUPROFEN OR ASPIRIN.    Acid Reducer and Gallbladder Medication  It is important to reduce the amount of acid in your new stomach for a couple months after surgery while it is healing.  We will prescribe an acid reducer that you should take daily.  It is important for you to let us know if you have any symptoms of heartburn or reflux.      For those of you who still have a gallbladder, we will also prescribe a medication called Actigall (ursodiol) and we recommend taking it twice a day for 6 months.  It is only effective if you take it twice a day.  You  will start taking this two weeks after surgery.    ER Needs  If you need to go to the Emergency Room, we prefer [facility] go to [facility].  Be sure to inform the ER staff that you are a bariatric surgery patient, and ask them to notify your surgeon that you are there.    Remember to refer to your bariatric program handbook and keep follow up appointments for long-term success. You will need regular labs to check your nutritional status and it is very important to take ALL your supplements and protein religiously,    For urgent concerns on evenings, weekends & holidays, call the clinic and the message will direct you to the surgeon on call.    [facility] Surgery and Bariatric Care: [phone]

## 2021-05-28 NOTE — PROGRESS NOTES
Time in: 10:00a   /Time out: 10:30a     BMI: There is no height or weight on file to calculate BMI.   Pt presents for 1 week post op dietitian follow up. Reports tolerating full liquids well. Denies n/v, constipation/diarrhea, or significant pain. Taking MVI and SL B12 appropriately. Taking 50 oz fluid/day. Educated pt on pureed and soft to bariatric regular diets. Provided grocery list and sample meal plan for each diet stage.  Pt will begin pureed diet on 5/23 and advance as tolerated to softs/bariatric regular on 6/14. Instructed pt to begin 500 mg calcium citrate BID and 5000IU Vitamin D3 on 6/8. Pt to follow up with RD at 3 months post op.

## 2021-05-28 NOTE — ANESTHESIA POSTPROCEDURE EVALUATION
Patient: Donna Metcalfer  GASTRECTOMY, SLEEVE, LAPAROSCOPIC  Anesthesia type: general    Patient location: PACU  Last vitals:   Vitals Value Taken Time   /61 5/8/2019  7:28 AM   Temp 36.9  C (98.4  F) 5/8/2019  7:28 AM   Pulse 82 5/8/2019  7:28 AM   Resp 16 5/8/2019  7:28 AM   SpO2 99 % 5/8/2019  7:28 AM     Post vital signs: stable  Level of consciousness: awake and responds to simple questions  Post-anesthesia pain: pain controlled  Post-anesthesia nausea and vomiting: no  Pulmonary: unassisted  Cardiovascular: stable  Hydration: adequate  Anesthetic events: no    QCDR Measures:  ASA# 11 - Joan-op Cardiac Arrest: ASA11B - Patient did NOT experience unanticipated cardiac arrest  ASA# 12 - Joan-op Mortality Rate: ASA12B - Patient did NOT die  ASA# 13 - PACU Re-Intubation Rate: ASA13B - Patient did NOT require a new airway mgmt  ASA# 10 - Composite Anes Safety: ASA10A - No serious adverse event    Additional Notes:

## 2021-05-28 NOTE — TELEPHONE ENCOUNTER
Patient called in to let the care team know that she  Has completed her labs and all her pre-op visits.     Her PCP will be sending over records over.    Patient was also inquiring before and after pictures - she does not need a follow-up call.

## 2021-05-28 NOTE — ANESTHESIA PREPROCEDURE EVALUATION
Anesthesia Evaluation      Patient summary reviewed   No history of anesthetic complications     Airway   Mallampati: II  Neck ROM: full   Pulmonary - negative ROS and normal exam                          Cardiovascular - negative ROS and normal exam   Neuro/Psych - negative ROS     Endo/Other    (+) obesity (BMI 40),      GI/Hepatic/Renal - negative ROS           Dental - normal exam                        Anesthesia Plan  Planned anesthetic: general endotracheal and ITN  ITN duromorph  ASA 3   Induction: intravenous   Anesthetic plan and risks discussed with: patient    Post-op plan: routine recovery

## 2021-05-28 NOTE — ANESTHESIA CARE TRANSFER NOTE
Last vitals:   Vitals:    05/07/19 0837   BP: (!) 175/93   Pulse: 92   Resp: 16   Temp: 36.5  C (97.7  F)   SpO2: 100%     Patient's level of consciousness is drowsy  Spontaneous respirations: yes  Maintains airway independently: yes  Dentition unchanged: yes  Oropharynx: oropharynx clear of all foreign objects    QCDR Measures:  ASA# 20 - Surgical Safety Checklist: WHO surgical safety checklist completed prior to induction    PQRS# 430 - Adult PONV Prevention: 4558F - Pt received => 2 anti-emetic agents (different classes) preop & intraop  ASA# 8 - Peds PONV Prevention: NA - Not pediatric patient, not GA or 2 or more risk factors NOT present  PQRS# 424 - Joan-op Temp Management: 4559F - At least one body temp DOCUMENTED => 35.5C or 95.9F within required timeframe  PQRS# 426 - PACU Transfer Protocol: - Transfer of care checklist used  ASA# 14 - Acute Post-op Pain: ASA14B - Patient did NOT experience pain >= 7 out of 10

## 2021-05-28 NOTE — ANESTHESIA PROCEDURE NOTES
Spinal Block    Patient location during procedure: pre-op  Start time: 5/7/2019 7:06 AM  End time: 5/7/2019 7:14 AM  Reason for block: at surgeon's request and post-op pain management    Staffing:  Performing  Anesthesiologist: Soledad Masterson MD    Preanesthetic Checklist  Completed: patient identified, risks, benefits, and alternatives discussed, timeout performed, consent obtained, airway assessed, oxygen available, suction available, emergency drugs available and hand hygiene performed  Spinal Block  Patient position: sitting  Prep: ChloraPrep and site prepped and draped  Patient monitoring: heart rate, cardiac monitor, continuous pulse ox and blood pressure  Approach: midline  Location: L3-4  Injection technique: single-shot  Needle type: pencil-tip   Needle gauge: 22 G

## 2021-05-28 NOTE — TELEPHONE ENCOUNTER
Post-Op Phone Call  [facility] Bariatric Care    Surgeon: Austyn Louis M.D.  Date of Surgery: 5/7/2019  Discharge Date: 5/8/2019    Date/Time Called:   Date: 5/10/2019 Time: 4:03 PM   Attempt: First    Patient unavailable, message left to call [facility] with questions/problems? No    Pain Control:  Intensity: Mild (1 - 3)  Duration/Location/Explain: incisional pain  What makes it better/worse? Getting up from sitting or standing, goes up to 7/10.  Some issues with cramping/tightness in her esophagus when she swallows, script for hyoscyamine sent to her pharmacy and gave her instructions on taking the medication.  Pt verbalized understanding.    Medications:  Narcotic Use - No  Drug type: Gabapentin  Frequency: every 8 hours    Non-prescription pain control: Tylenol every 8 hours    Other medications currently taking: See med list    Complete Multivitamin + Iron BID? Yes    Vitamin B12? sublingual daily    Incisions:  Drainage? clean and dry  Comment: Will take bandaids off    Intake/Output:  Fluid Intake(oz/day)? 30 oz so far today  Fluid type? water    Heartburn? No  Counseling: Omeprazole  Nausea? No  Vomiting? No  Explain:     Voiding Frequency? 4 or more/day     Voiding-Color/Amount? Good.    Flatus? Yes    Bowel Movement?Yes     Are you using your incentive spirometer? If yes, how often? 3+/day    Any fever type symptoms? No  Explain:     Walking activity?   Frequency/Type: walking around the house.    In Preparation for Surgery:  On a scale of 1-5, with 5 being the highest, how well did the pre-op class prepare you for what actually happened in the hospital? 5  If you were unable to give us a 5, what could we have done to earn a 5?     Is there anything that you wish you would have known prior to surgery that you did not know? If yes, what? No    On a scale of 1-5, with 5 being the highest, how was the service while you were in the hospital? 5  If you were unable to give us a 5, what could we have done  to earn a 5?     Is/was there anyone in particular; nurse, aide, hospital staff, that did a great job and you would like us to recognize? If yes, whom. DEWAYNE Saucedo and Darby Vivas NP  What did they do? They were all great.    Would you recommend [facility] to others? Yes  If no, can you explain why?     Would you recommend [facility] to others?Yes  If no, can you explain why?      Thank you for your time. Please do not hesitate to call us with any questions or concerns.    Call completed by:   Sheryl Watkins RN, Count includes the [facility] Surgery and Bariatric Care  P [phone]  F [phone]

## 2021-05-28 NOTE — PROGRESS NOTES
"HPI: Pt is here for follow up of a laparoscopic sleeve gastrectomy on May 7. She is doing well. Taking po well, estimating she is getting in 64+ oz of fluid. No vomiting.  No pain with drinking or eating purees. No fevers or chills. Ambulating without problems.     Current Outpatient Medications   Medication Sig Dispense Refill     cholecalciferol, vitamin D3, (CHOLECALCIFEROL) 1,000 unit tablet Take 2,000 Units by mouth daily.              cyanocobalamin, vitamin B-12, 1,000 mcg Subl Place 1,000 mcg under the tongue daily. Sublingual spray             levonorgestrel (MIRENA) 20 mcg/24 hr (5 years) IUD 1 each by Intrauterine route.       omeprazole (PRILOSEC) 20 MG capsule Take 1 capsule (20 mg total) by mouth daily. Open capsule and sprinkle on food. 90 capsule 0     pediatric multivitamin-iron (POLY-VI-SOL WITH IRON) chewable tablet Chew 1 tablet 2 (two) times a day.       [START ON 5/21/2019] ursodiol (ACTIGALL) 300 mg capsule Take 1 capsule (300 mg total) by mouth 2 (two) times a day. Start 2 wks after surgery. Do not open capsule. Swallow whole with warm water. 180 capsule 1     hyoscyamine (LEVSIN/SL) 0.125 mg SL tablet Take 1 tablet (0.125 mg total) by mouth every 4 (four) hours as needed for cramping. 90 tablet 1     No current facility-administered medications for this visit.          /72   Pulse 96   Temp 99.5  F (37.5  C) (Oral)   Resp (!) 100   Ht 5' 8\" (1.727 m)   Wt (!) 252 lb (114.3 kg)   LMP  (LMP Unknown) Comment: has IUD  BMI 38.32 kg/m    Wt Readings from Last 3 Encounters:   05/20/19 (!) 252 lb (114.3 kg)   05/07/19 (!) 269 lb 3.2 oz (122.1 kg)   04/22/19 (!) 275 lb 9.6 oz (125 kg)     Body mass index is 38.32 kg/m .    EXAM:  GENERAL:Appears well  ABDOMEN: Incisions healing well      Assessment/Plan: Pt s/p laparoscopic sleeve gastrectomy. Doing well. Diet and activity discussed. She will f/u with us at the Bariatric Center in 1 month to meet with our dietician, and again at 3 " months for additional follow up.    Austyn Louis MD  [facility] Department of Surgery

## 2021-05-28 NOTE — PROGRESS NOTES
Patient attended group class to review pre-op instructions and dietary plan for upcoming surgery.    Pt will begin 2-week pre-op liquid diet 4/23/2019, will do clear liquids the day before surgery. Pt is scheduled for Gastric Sleeve on 5/7/2019, and will then follow 2 weeks post-op liquid diet. Pt will f/u with RD 1-week post-op for further diet advancement.    Educated pt on 2-week pre-op and 1-week post-op liquid diets. Discussed appropriate liquids and demonstrated portions for each of the food groupings during each diet phase. Reviewed appropriate calories/protein/fluid goals during 2-week pre-op liquid diet. Educated on correct vitamins/minerals to take after surgery in correct dosage and frequency. Provided grocery list and sample menu plan for each diet stage, as well as unflavored protein powder samples.       Discussed admission process and hospital course.  Pharmacy information packet given and explained. Patient was given exercises to work on post-op for maintaining muscle mass and strengthening that was created by [facility].  Bariatric quiz given to patient for a review on their own.  Discharge instructions, information card and follow up appointments given and reviewed with pt at this time and patient verbalized understanding. She will have her H&P on 4/26/2019 at [facility] and do her  pre-op testing during her visit with her primary. Prescriptions for Omeprazole and Actigall sent to the patient's pharmacy to be started after surgery.      Soledad Mccollum RN, CBN

## 2021-05-29 NOTE — PATIENT INSTRUCTIONS - HE
Dr. Flores [address], [address] [facility].   3:30 pm, 5/22/19  Watch temps, if worsening symptoms, nausea, vomiting, fever, go to the ER.

## 2021-05-29 NOTE — TELEPHONE ENCOUNTER
Question following Office Visit  When did you see your provider: today   What is your question: Patient states Анна Harvey PA-C was going to recommend a specific chewable multivitamin. However, this was not on her papers. The patient is asking if this could be sent as a 90 day supply to [facility] to see if her insurance will cover it.  Okay to leave a detailed message: Yes

## 2021-05-29 NOTE — TELEPHONE ENCOUNTER
Pt called and left message stating that she did not receive her pain medication after her office visit yesterday with Dr. Shepherd.     A Rx of Dilaudid was prescribed, but printed instead of being e-faxed to her pharmacy.     Dr. Shepherd is out of the office today. I will have Darby Vivas CNP sign medication in his absence.

## 2021-05-29 NOTE — PROGRESS NOTES
CHIEF COMPLAINT:  Chief Complaint   Patient presents with     rectal pain x11 days     pain while having bowel movement-sitting-walking- tender to touch- mild discharge        HPI:  Donna Bolanos is a female who is seen for new anal drainage and pain in the perianal area.  This symptom has been going on for 11 days.  She had recent gastric bypass surgery, has been on a liquid diet.  She notes some constant loose stools since her surgery.  She has had some low-grade fever in the last 2 days, in the 99.5 range.  She has pain about 5 out of 10 with a bowel movement.  She notices some mucus.  She denies rectal pain, swelling of the rectal area, hemorrhoid.  She has had some passage of blood but none no now.  She is not currently on any opiate medications for pain, is taking Tylenol.  She talked to her bariatric surgeon who was concerned that she may have a perianal abscess.  She was advised to see her primary care, she is seen today in office since her primary is not available.  She notes that she is also had ongoing headaches since her surgery.  She is otherwise had no other symptoms.  Review of Systems:  ROS: Patient denies, chills, sweats, fainting, fatigue, weight change, dizziness, sleep problems, chest pain, palpitations, shortness of breath, wheezing, cough,  sore throat, changes in hearing, ear pain,tinnitus,  disphagia, sore throat, globus, changes in vision, eye pain eye redness, acid reflux, nausea, vomiting, diarrhea, constipation, black or bloody stools,  Dysuria, frequency, urinary incontinence, nocturia, hematuria, back pain,joint pain, bone pain, muscle cramps,edema, weakness, numbness, tingling of extremities, rash, itching, skin changes, swollen lymph nodes, thirst, increased urination, breast lumps, breast pain, nipple discharge, memory difficulties, anxiety, mood swings, (female)vaginal discharge, dyspareunia, menorrhagia, pelvic pain, sexual dysfunction,   (male) testicular lumps, erectile  "dysfunction.    PREVIOUS MEDICAL HISTORY  No past medical history on file.  PREVIOUS SURGICAL HISTORY  Past Surgical History:   Procedure Laterality Date     NO PAST SURGERIES       CURRENT MEDICATIONS  Current Outpatient Medications on File Prior to Visit   Medication Sig Dispense Refill     cyanocobalamin, vitamin B-12, 1,000 mcg Subl Place 1,000 mcg under the tongue daily. Sublingual spray             levonorgestrel (MIRENA) 20 mcg/24 hr (5 years) IUD 1 each by Intrauterine route.       omeprazole (PRILOSEC) 20 MG capsule Take 1 capsule (20 mg total) by mouth daily. Open capsule and sprinkle on food. 90 capsule 0     pediatric multivitamin-iron (POLY-VI-SOL WITH IRON) chewable tablet Chew 1 tablet 2 (two) times a day.       ursodiol (ACTIGALL) 300 mg capsule Take 1 capsule (300 mg total) by mouth 2 (two) times a day. Start 2 wks after surgery. Do not open capsule. Swallow whole with warm water. 180 capsule 1     cholecalciferol, vitamin D3, (CHOLECALCIFEROL) 1,000 unit tablet Take 2,000 Units by mouth daily.              hyoscyamine (LEVSIN/SL) 0.125 mg SL tablet Take 1 tablet (0.125 mg total) by mouth every 4 (four) hours as needed for cramping. 90 tablet 1     No current facility-administered medications on file prior to visit.        ALLERGIES  No Known Allergies    PROBLEM LIST  Patient Active Problem List   Diagnosis     Morbid obesity due to excess calories (H)       SOCIAL HISTORY  [unfilled]  OBJECTIVE:  /80 (Patient Site: Right Arm, Patient Position: Sitting, Cuff Size: Adult Large)   Pulse 80   Ht 5' 8.5\" (1.74 m)   Wt (!) 254 lb 11.2 oz (115.5 kg)   LMP  (LMP Unknown) Comment: has IUD  SpO2 99%   BMI 38.16 kg/m      General: Shows alert and oriented, well-nourished, well-developed, pleasant  male, NAD.Hydration: good  Vital Signs: Pulse 143   Temp 98.2  F (36.8  C)   Wt 28 lb (12.7 kg)   SpO2 96%   General: Appears well, in no apparent distress.   Mouth: mucous membranes moist  Neck: Soft, " no adenopathy  Heart: Regular rate and rhythm, no murmurs, clicks or rubs.   Lungs:  Clear to auscultation.  Abdomen: abdomen is soft with mild diffuse tenderness, no guarding, mass, rebound or organomegaly.  Well-healed surgical scars with no increased erythema or tenderness to palpation.  Rectal area: Rectal drainage of purulent and bloody exudates.  Tender rectal mass deep to surface, no apparent tracking, does express fluid to the rectal area when palpated, minimal tenderness, minimal redness or increased warmth.  None   ASSESSMENT/PLAN:  1. Pilonidal abscess  sulfamethoxazole-trimethoprim (BACTRIM) 400-80 mg/5 mL injection     Called general surgery and patient can be seen tomorrow morning.  Advised to start antibiotics, watch for any worsening signs such as nausea, vomiting, fever or worsening, go to the emergency room if any of these develop, follow-up tomorrow in general surgery for incision and drainage or surgical removal of possible infected pilonidal cyst versus abscess which has tracked to the rectal area.  She voices understanding.  Анна Harvey 12/11/2018 6:03 AM

## 2021-05-29 NOTE — PROGRESS NOTES
GENERAL SURGICAL CONSULTATION    I was requested by Gela Hummel NP to consult on this pt to evaluate them for a perirectal abscess    HPI:  This is a 34 y.o. female here today with pain that started around the anus at the anterior left side about a week ago ago.  This pain started to become quite intense and then started to drain yesterday.    Allergies:Patient has no known allergies.    Past Medical History:   Diagnosis Date     Obesity        Past Surgical History:   Procedure Laterality Date     AR LAP, PANCHO RESTRICT PROC, LONGITUDINAL GASTRECTOMY N/A 5/7/2019    Procedure: GASTRECTOMY, SLEEVE, LAPAROSCOPIC;  Surgeon: Austyn Louis MD;  Location: [facility];  Service: General       CURRENT MEDS:  Current Outpatient Medications   Medication Sig Dispense Refill     cholecalciferol, vitamin D3, (CHOLECALCIFEROL) 1,000 unit tablet Take 2,000 Units by mouth daily.              cyanocobalamin, vitamin B-12, 1,000 mcg Subl Place 1,000 mcg under the tongue daily. Sublingual spray             hyoscyamine (LEVSIN/SL) 0.125 mg SL tablet Take 1 tablet (0.125 mg total) by mouth every 4 (four) hours as needed for cramping. 90 tablet 1     levonorgestrel (MIRENA) 20 mcg/24 hr (5 years) IUD 1 each by Intrauterine route.       omeprazole (PRILOSEC) 20 MG capsule Take 1 capsule (20 mg total) by mouth daily. Open capsule and sprinkle on food. 90 capsule 0     pediatric multivitamin-iron (POLY-VI-SOL WITH IRON) chewable tablet Chew 1 tablet 2 (two) times a day.       sulfamethoxazole-trimethoprim (BACTRIM) 400-80 mg/5 mL injection 10 ml two times a day x 10 days 200 mL 0     ursodiol (ACTIGALL) 300 mg capsule Take 1 capsule (300 mg total) by mouth 2 (two) times a day. Start 2 wks after surgery. Do not open capsule. Swallow whole with warm water. 180 capsule 1     No current facility-administered medications for this visit.        Family History   Problem Relation Age of Onset     Scoliosis Mother       Depression Sister      Liver disease Brother      No Medical Problems Father      No Medical Problems Sister      No Medical Problems Sister      Family history is not pertinent to this patients Chief Complaint.     reports that she quit smoking about 9 years ago. She has never used smokeless tobacco. She reports that she drinks alcohol. She reports that she does not use drugs.    Review of Systems -   10 point Review of systems is negative except for; as mentioned above in HPI and PMHx    LMP  (LMP Unknown) Comment: has IUD  There is no height or weight on file to calculate BMI.    EXAM:  GENERAL: Well developed female   Rectal exam: Indurated area of the perirectal tissue at the 8 o'clock position, when this area is palpated purulent fluid comes out through the anus  HEENT: EOMI, Anicteric Sclera, Moist Mucous Membranes,  In Mouth the pt does not have redness or bleeding gums  CARDIOVASCULAR: RRR w/out murmur   CHEST/LUNG: Clear to Auscultation  ABDOMEN:  Non tender to palpation, +BS  MUSCULOSKELETAL:  No deformities with good range of motion in all extremities  NEURO: She is ambulatory with good strength in both legs.  HEME/LYMPH: No Cervical Adenopathy or tenderness.     IMAGES:  None    Assessment/Plan:  Perirectal abscess that is draining into the lumen of the rectum.  This lesion needs further and more extensive incision and drainage.      Procedure note  Sterile prep with Betadine  A field block was infused with 1% lidocaine with epinephrine  A 1.5 cm incision was made over the abscess  The abscess pocket was cleaned out with gauze  The abscess was packed with gauze.  The wound was covered over with gauze and tape  The patient was given instructions to change the dressings and packing daily for the next 1-2 weeks      Amado Shepherd MD  [facility]  [phone]

## 2021-05-29 NOTE — PROGRESS NOTES
Donna Knutson is a 34-year-old woman known to me with a perirectal abscess.  I opened and drained this perirectal abscess.  She did well for period of time but then she started draining fluid from the incision site from perirectal abscess again 3 or 4 days ago.  I evaluated the wound which looks to be unremarkable she has some surrounding induration but no erythema.  There is a small amount of purulent fluid expressed up to the incision site from where that perirectal abscess was entered.  I probed this wound but I cannot appreciate where it connects into a deeper pocket or tract.  With that I recommend we treat her with a course of antibiotics and see how this progresses.    I did discuss with the patient that she has a potential for having reformed an abscess with a tissue has come together and healed over the abscess pocket or she has potential for a anal fistula which is a source of reinfection to the site but before getting carried away with these eventualities I recommend we treat this with antibiotics and see how things work out.  If she is not feeling better then I be happy to see her back in 2 to 3 weeks.    Amado [facility]  [phone]

## 2021-05-29 NOTE — TELEPHONE ENCOUNTER
RN cannot approve Refill Request    RN can NOT refill this medication med is not covered by policy/route to provider.    Lauri Mansfield, [facility] Triage/Med Refill 5/22/2019    Requested Prescriptions   Pending Prescriptions Disp Refills     sulfamethoxazole-trimethoprim (BACTRIM) 400-80 mg/5 mL injection [Pharmacy Med Name: SULFAMETHOXAZOLE-TMP INJ -80 SOLN] 200 mL 0     Sig: 10 ML TWO TIMES A DAY X 10 DAYS *NEEDS CLARIFICATION - INJECTION?*       There is no refill protocol information for this order

## 2021-05-29 NOTE — TELEPHONE ENCOUNTER
Patient called and said that she was just in to see Heriberto yesterday for her 2 week post-op bariatric surgery visit and forgot to mention to him some issues she was having with constipation and now with some anal leakage.  Patient said she had constipation on the pre-op liquid diet and then again after surgery. She is now stooling regularly but noticed some opaque yellow to brownish anal leakage that she has been having the last two days.  She has pain with sitting and she also has a hard lump forming near the anus.  Discussed with Dr. Louis and he said he would be happy to see her in clinic but doesn't have any openings in clinic until later next week.  He encouraged her to make an appointment with her primary clinic in the meantime because it may be a pimple or cellulitis and not directly related to bariatric surgery.  Soledad Mccollum RN

## 2021-05-29 NOTE — TELEPHONE ENCOUNTER
Pt called with questions about her wound (per-rectal abscess). She states that it had completely healed at her last follow up. She noticed a large amount of brown-yellowish drainage yesterday and the wound had opened. Denies any pain or odor.      Informed her that the drainage doesn't sound worrisome for infection, however to continue to monitor for pain, redness, swelling, fever/chills and call me right away if this happens.    She will resume packing daily again and covering with gauze. Pt would like to be scheduled on Friday with Dr. Shepherd for evaluation before he is out of the office for two weeks.

## 2021-05-30 NOTE — ANESTHESIA POSTPROCEDURE EVALUATION
Patient: Donna Bolanos  RECTAL EXAM UNDER ANESTHESIA, Seton placement, anal fistula debridment,  Anesthesia type: MAC    Patient location: Phase II Recovery  Last vitals:   Vitals Value Taken Time   /63 7/2/2019  2:00 PM   Temp 36.3  C (97.4  F) 7/2/2019  1:17 PM   Pulse 63 7/2/2019  1:59 PM   Resp 16 7/2/2019  1:45 PM   SpO2 100 % 7/2/2019  1:59 PM   Vitals shown include unvalidated device data.  Post vital signs: stable  Level of consciousness: awake and responds to simple questions  Post-anesthesia pain: pain controlled  Post-anesthesia nausea and vomiting: no  Pulmonary: unassisted, return to baseline  Cardiovascular: stable and blood pressure at baseline  Hydration: adequate  Anesthetic events: no    QCDR Measures:  ASA# 11 - Joan-op Cardiac Arrest: ASA11B - Patient did NOT experience unanticipated cardiac arrest  ASA# 12 - Joan-op Mortality Rate: ASA12B - Patient did NOT die  ASA# 13 - PACU Re-Intubation Rate: ASA13B - Patient did NOT require a new airway mgmt  ASA# 10 - Composite Anes Safety: ASA10A - No serious adverse event    Additional Notes:

## 2021-05-30 NOTE — ANESTHESIA CARE TRANSFER NOTE
Last vitals:   Vitals:    07/02/19 1317   BP: 125/59   Pulse: (!) 104   Resp: 16   Temp: 36.3  C (97.4  F)   SpO2: 100%     Patient's level of consciousness is awake and alert  Spontaneous respirations: yes  Maintains airway independently: yes  Dentition unchanged: yes  Oropharynx: oropharynx clear of all foreign objects    QCDR Measures:  ASA# 20 - Surgical Safety Checklist: WHO surgical safety checklist completed prior to induction    PQRS# 430 - Adult PONV Prevention: 4558F - Pt received => 2 anti-emetic agents (different classes) preop & intraop  ASA# 8 - Peds PONV Prevention: NA - Not pediatric patient, not GA or 2 or more risk factors NOT present  PQRS# 424 - Joan-op Temp Management: 4559F - At least one body temp DOCUMENTED => 35.5C or 95.9F within required timeframe  PQRS# 426 - PACU Transfer Protocol: - Transfer of care checklist used  ASA# 14 - Acute Post-op Pain: ASA14B - Patient did NOT experience pain >= 7 out of 10

## 2021-05-30 NOTE — TELEPHONE ENCOUNTER
Pt calling back. Informed her I have sent Dr. Shepherd a message and have not heard back. Advised I would attempt to send another message to him for recommendations. He will be in clinic tomorrow as well.    English

## 2021-05-30 NOTE — PROGRESS NOTES
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  A seton was placed.  The operative site was fairly uncomfortable for the first week but she more recently has been doing much better and is now having some drainage from the seton site but overall is fairly comfortable at this point.   she is doing well.  Pain is well controlled.  No difficulties with the surgical wound/wounds.  she is eating well and denies fever and chills.         There were no vitals taken for this visit.    EXAM:  GENERAL:Appears well  ABDOMEN:  Soft, +BS  SURGICAL WOUNDS: Seton in the anal fistulous tract      Assessment/Plan: . Doing well after exam under anesthesia where she had a seton placed in her anal fistula.  This seton will need to stay in place for 3 to 6 months.  I will refer this patient to colorectal surgery to get their input on treatment of her anal fistula.  I did mention Dr. Fabiola Dwyer to her and she seemed enthusiastic about seeing a specialist in this field.  Surgery and should follow up as needed.    Consultation with colorectal surgery please request of Dr.Judith Meme Shepherd MD  [facility] Department of Surgery

## 2021-05-30 NOTE — PROGRESS NOTES
GENERAL SURGICAL CONSULTATION    I was requested by Gela Hummel NP to consult on this pt to evaluate them for a perirectal abscess    HPI:  This is a 34 y.o. female is having persistent challenges with drainage from the perirectal abscess that was evaluated on this patient's last clinic visit.    Allergies:Patient has no known allergies.    Past Medical History:   Diagnosis Date     Obesity        Past Surgical History:   Procedure Laterality Date     SD LAP, PANCHO RESTRICT PROC, LONGITUDINAL GASTRECTOMY N/A 5/7/2019    Procedure: GASTRECTOMY, SLEEVE, LAPAROSCOPIC;  Surgeon: Austyn Louis MD;  Location: [facility];  Service: General       CURRENT MEDS:  Current Outpatient Medications   Medication Sig Dispense Refill     cholecalciferol, vitamin D3, (CHOLECALCIFEROL) 1,000 unit tablet Take 2,000 Units by mouth daily.              cyanocobalamin, vitamin B-12, 1,000 mcg Subl Place 1,000 mcg under the tongue daily. Sublingual spray             levonorgestrel (MIRENA) 20 mcg/24 hr (5 years) IUD 1 each by Intrauterine route.       multivit with min-folic acid 200 mcg Chew Chew 1 each daily. 90 tablet 3     omeprazole (PRILOSEC) 20 MG capsule Take 1 capsule (20 mg total) by mouth daily. Open capsule and sprinkle on food. 90 capsule 0     pediatric multivitamin-iron (POLY-VI-SOL WITH IRON) chewable tablet Chew 1 tablet 2 (two) times a day.       ursodiol (ACTIGALL) 300 mg capsule Take 1 capsule (300 mg total) by mouth 2 (two) times a day. Start 2 wks after surgery. Do not open capsule. Swallow whole with warm water. 180 capsule 1     levoFLOXacin (LEVAQUIN) 500 MG tablet Take 1 tablet (500 mg total) by mouth daily. 7 tablet 0     No current facility-administered medications for this visit.        Family History   Problem Relation Age of Onset     Scoliosis Mother      Depression Sister      Liver disease Brother      No Medical Problems Father      No Medical Problems Sister      No Medical Problems  "Sister      Family history is not pertinent to this patients Chief Complaint.     reports that she quit smoking about 9 years ago. She has never used smokeless tobacco. She reports that she drinks alcohol. She reports that she does not use drugs.    Review of Systems -   10 point Review of systems is negative except for; as mentioned above in HPI and PMHx    /88 (Patient Site: Right Arm, Patient Position: Sitting, Cuff Size: Adult Large)   Pulse 77   Ht 5' 8.5\" (1.74 m)   Wt (!) 238 lb (108 kg)   SpO2 100%   Breastfeeding? No   BMI 35.66 kg/m    Body mass index is 35.66 kg/m .    EXAM:  GENERAL: Well developed female   Rectal exam: Indurated area of the perirectal tissue at the 8 o'clock position, when this area is palpated purulent fluid comes out through the anus  HEENT: EOMI, Anicteric Sclera, Moist Mucous Membranes,  In Mouth the pt does not have redness or bleeding gums  CARDIOVASCULAR: RRR w/out murmur   CHEST/LUNG: Clear to Auscultation  ABDOMEN:  Non tender to palpation, +BS  MUSCULOSKELETAL:  No deformities with good range of motion in all extremities  NEURO: She is ambulatory with good strength in both legs.  HEME/LYMPH: No Cervical Adenopathy or tenderness.     IMAGES:  None    Assessment/Plan:  Perirectal abscess that has not recovered.  This raises concern for potential fistula.  Like to evaluate this patient with an exam under anesthesia to better understand the anatomy of this lesion and to rule out an anal fistula.  I did discuss with the patient that if the fistula exists that we would likely treat this with a seton and a subsequent procedure down the road.    Perineal exam under anesthesia          Amado Shepherd MD  [facility]  [phone]  "

## 2021-05-30 NOTE — PROGRESS NOTES
Time in: 4:00 pm Time out: 5:00 pm  .     Pt presents for 1 month post op dietitian follow up class . Reports tolerating soft food diet well. Denies n/v, constipation/diarrhea, or significant pain. Taking MVI and SL B12 appropriately.Instructed pt to begin taking 500 mg calcium citrate BID and 5000 IU Vitamin D3. Educated pt on soft to bariatric regular diets, medications, developing an exercise regimen, and other dietary points of care. Provided  Education handout on items discussed . Pt to follow up with RD at 3 months post op.

## 2021-05-30 NOTE — TELEPHONE ENCOUNTER
Pt calling with an update of her perirectal abscess. She was seen last on 6/7/19 by Dr. Shepherd. There was a small amount of purulent fluid expressed up to the incision site from where her perirectal abscess was entered. She was placed on antibiotics  (Levaquin 500 mg daily x 7). Pt states that she was doing much better on the antibiotics, however about 5 days after stopping them, has some drainage that has re-occurred. She states it is very similar to before and not worse. Denies any pain, but can feel some swelling in the area.    She is wondering if the dose of antibiotics should be repeated. Advised that I would follow up with Dr. Shepherd and call her back today with plan.

## 2021-05-30 NOTE — ANESTHESIA PREPROCEDURE EVALUATION
Anesthesia Evaluation      Patient summary reviewed   History of anesthetic complications (PONV)     Airway   Mallampati: II  Neck ROM: full   Pulmonary - normal exam    breath sounds clear to auscultation  (+) a smoker (former)  (-) wheezes                         Cardiovascular - negative ROS and normal exam  Exercise tolerance: > or = 4 METS  (-) murmur  Rhythm: regular  Rate: normal,    no murmur      Neuro/Psych - negative ROS     Endo/Other    (+) obesity (BMI 35.66),      GI/Hepatic/Renal - negative ROS           Dental - normal exam                        Anesthesia Plan  Planned anesthetic: MAC  Plan for propofol gtt w/ fentanyl PRN for pain.  Discussed potential need to convert to GA w/ ETT vs LMA if not tolerating.  Explained that it is possible to remember certain aspects of the OR experience during MAC dependent on the depth of sedation and patient understands this.  Hx of PONV and plan for scopolamine, decadron, zofran for PONV ppx.  ASA 2     Anesthetic plan and risks discussed with: patient  Anesthesia plan special considerations: antiemetics,   Post-op plan: routine recovery

## 2021-05-31 NOTE — PROGRESS NOTES
"Post-op Surgical Weight Loss Diet Evaluation     Assessment:  Pt presents for 3 months post-op RD visit, s/p LSG on 5/8/19 with Dr. Louis. Today we reviewed current eating habits and level of physical activity, and instructed on the changes that are required for successful bariatric outcomes.    Patient Progress: Tolerating foods and fluids well; discouraged in weight loss stall. Having difficulty getting in 64 oz/day.   Pt had perirectal abscess on 5/22; fistula debridement (seton placed) on 7/2.     Pt's goal weight: 170 lb     Pt's Initial Weight: 270.5 lbs  Weight: (!) 231 lb 1.6 oz (104.8 kg)  Weight loss from initial: 39.4  % Weight loss: 14.57 %    Body mass index is 34.63 kg/m .     Concerns: large portion sizes, poor fiber intake, snacking, poor H20 intake, and increased exercise needed.      Vitamins   Multi Vit: yes  Calcium Citrate: yes- TID  B12: yes  D3: yes  Iron: yes     Do you experience hunger? Yes   Do you have \"dumping\" syndrome?No    How often?n/a   With what foods: n/a   Do you experience any reflux or discomfort with eating? No   -Are you still taking omeprazole? Is this new since you stopped taking it?  Yes   Nausea: no  Vomiting: no  Diarrhea: no  Constipation: no  Hair loss: yes     Diet Recall/Time:   Breakfast: protein shake w/ powder, fruit, kefir, almond milk, power greens (40g)   Am Snack: almonds or 1/2 protein bar (10g)  Lunch: lunch meat, cheese, tortilla (15g)   Pm snack:1/2 protein bar  Dinner:  Meat, vegetable (10g)   HS Snack: none     Estimated protein intake: 70-80 grams    Estimated portion size per meals:1 cup/meal    Meals per week away from home: seldom   Recommended limiting eating out to no more than 2x/week.    Meal Duration:30 minutes  Encouraged slowing meal times down, 20-30 minutes, chewing to applesauce consistency.     Fluid-meal separation:  Fluids are  30min before and 30 minutes after meals.  The patient and I reviewed the anatomy of the bypass and why " " fluids from a meal is so important.    Fluid Intake  Water: 40 oz     Discussed the importance of adequate hydration after surgery and the goal of 64+ oz of fluid daily.   The patient understands the importance of avoiding all carbonated, caffeinated, and sweetened drinks; and instead choosing 64oz plain water.    Exercise  Type: walking and core strength exercise   Frequency (days/week): 2-3x/week   Duration (minutes/day): 30 minutes     PES statement:    1. (NC-1.4) Altered GI Function related to Alteration in gastrointestinal tract structure and/or function/ Decreased functional length of the GI tract as evidenced by Weight loss of 14% initial body weight; sleeve gastrectomy.     Intervention    Discussion  1. Discussed 3 months  Post-Op Nutritional Guidelines for SG.  2. Encouraged increased anaerobic activity for further weight loss.   3. Recommended to consume 15-20gm protein at 3 meals daily, along with protein supplement/\"planned protein containing snack\" of 15-30gm protein, to reach goal of 60-80 gm protein daily.  4. Educated on post-op vitamin regimen: Multi Vit + iron 2x/day, calcium citrate 400-600 mg 2x/day, 9086-6081 mcg of Sublingual B-12 daily, and 5000 IU Vitamin D3 daily (MVI and calcium can    Instructions  1. Include 15-20gm protein at each meal, along with protein supplement/\"planned protein containing snack\" of 15-30gm protein, to reach goal of 60-80 gm protein daily.  2. Increase fluid intake to 64oz daily: choose plain or calorie/carbonation-free beverages.  3. Incorporate daily structured activity, 30-60 minutes most days of the week  4. Recommended pt to start taking: Multi Vit + iron 2x/day, calcium citrate 400-600 mg 2x/day, 6403-5331 mcg of Sublingual B-12 daily, and 5000 IU Vitamin D3 daily. (MVI and calcium can be taken at the same time)  5. Read food labels more consistently: keeping total fat grams <10, total sugar grams <10, fiber >3gm per serving.  6. Increase " vegetable/fruit intake, by having a vegetable or fruit with each meal daily.  7. Practice plate method: 1/2 plate lean/low fat protein source, vegetable/fruit, <25% of plate complex carbohydrates.  8. Separate fluids 30 minutes before/after meal times.  9. Practice eating off of smaller plates/bowls, chewing to applesauce consistency, taking 20-30 minutes to eat in a calm/relaxed environment without distractions of tv/email/cell phone.    Handouts provided:  3 months  Post-Op Nutritional Guidelines for SG      Monitor/Evaluation    Pt to follow up for 6 months  post-op visit with bariatrician       Time In: 7:30am  Time Out: 8:00am      ABN signed: Yes

## 2021-06-02 VITALS — BODY MASS INDEX: 40.62 KG/M2 | WEIGHT: 268 LBS | HEIGHT: 68 IN

## 2021-06-02 VITALS — WEIGHT: 267 LBS | BODY MASS INDEX: 40.6 KG/M2

## 2021-06-02 VITALS — WEIGHT: 266 LBS | BODY MASS INDEX: 40.45 KG/M2

## 2021-06-02 VITALS — HEIGHT: 68 IN | BODY MASS INDEX: 40.47 KG/M2 | WEIGHT: 267 LBS

## 2021-06-02 VITALS — HEIGHT: 68 IN | WEIGHT: 272 LBS | BODY MASS INDEX: 41.22 KG/M2

## 2021-06-02 VITALS — WEIGHT: 270.5 LBS | HEIGHT: 68 IN | BODY MASS INDEX: 41 KG/M2

## 2021-06-02 NOTE — TELEPHONE ENCOUNTER
----- Message from Sheryl Watkins RN sent at 8/13/2019  9:45 AM CDT -----  Regarding: HE labs  6 month s/p SG with Marcus on 11/11. Labs internal.

## 2021-06-02 NOTE — TELEPHONE ENCOUNTER
6 mos post op lab orders placed for patient in preparation for appointment with  in November.    Sheryl Watkins RN, CBN  [facility]  P [phone]  F [phone]

## 2021-06-02 NOTE — ANESTHESIA PREPROCEDURE EVALUATION
Anesthesia Evaluation      Patient summary reviewed   History of anesthetic complications (POVN)     Airway   Mallampati: II  Neck ROM: full   Pulmonary     breath sounds clear to auscultation  (+) a smoker  (-) pneumonia, asthma, shortness of breath, recent URI, sleep apnea                         Cardiovascular   Exercise tolerance: > or = 4 METS  (-) angina  Rhythm: regular  Rate: normal,         Neuro/Psych - negative ROS     Endo/Other    (+) obesity,   (-) no diabetes     GI/Hepatic/Renal            Dental    (+) caps                       Anesthesia Plan  Planned anesthetic: MAC  GA as needed   zofran  Decadron   ASA 2     Anesthetic plan and risks discussed with: patient    Post-op plan: routine recovery

## 2021-06-02 NOTE — ANESTHESIA POSTPROCEDURE EVALUATION
Patient: Donna Bolanos  EXAMINE UNDER ANESTHESIA, INCISION AND DRAINAGE PERIRECTAL ABSCESS  Anesthesia type: MAC    Patient location: PACU  Last vitals:   Vitals Value Taken Time   /66 10/31/2019  7:08 PM   Temp 37  C (98.6  F) 10/31/2019  7:08 PM   Pulse 82 10/31/2019  7:08 PM   Resp 14 10/31/2019  7:08 PM   SpO2 100 % 10/31/2019  7:08 PM     Post vital signs: stable  Level of consciousness: awake and responds to simple questions  Post-anesthesia pain: pain controlled  Post-anesthesia nausea and vomiting: no  Pulmonary: unassisted, return to baseline  Cardiovascular: stable and blood pressure at baseline  Hydration: adequate  Anesthetic events: no    QCDR Measures:  ASA# 11 - Joan-op Cardiac Arrest: ASA11B - Patient did NOT experience unanticipated cardiac arrest  ASA# 12 - Joan-op Mortality Rate: ASA12B - Patient did NOT die  ASA# 13 - PACU Re-Intubation Rate: NA - No ETT / LMA used for case  ASA# 10 - Composite Anes Safety: ASA10A - No serious adverse event    Additional Notes:

## 2021-06-02 NOTE — ANESTHESIA CARE TRANSFER NOTE
Last vitals:   Vitals:    10/31/19 1908   BP: 145/66   Pulse: 82   Resp: 14   Temp: 37  C (98.6  F)   SpO2: 100%     Patient's level of consciousness is alert  Spontaneous respirations: yes  Maintains airway independently: yes  Dentition unchanged: yes  Oropharynx: oropharynx clear of all foreign objects    QCDR Measures:  ASA# 20 - Surgical Safety Checklist: WHO surgical safety checklist completed prior to induction    PQRS# 430 - Adult PONV Prevention: 4558F - Pt received => 2 anti-emetic agents (different classes) preop & intraop  ASA# 8 - Peds PONV Prevention: NA - Not pediatric patient, not GA or 2 or more risk factors NOT present  PQRS# 424 - Joan-op Temp Management: 4559F - At least one body temp DOCUMENTED => 35.5C or 95.9F within required timeframe  PQRS# 426 - PACU Transfer Protocol: - Transfer of care checklist used  ASA# 14 - Acute Post-op Pain: ASA14B - Patient did NOT experience pain >= 7 out of 10

## 2021-06-03 VITALS — WEIGHT: 269.2 LBS | BODY MASS INDEX: 40.8 KG/M2 | HEIGHT: 68 IN

## 2021-06-03 VITALS — HEIGHT: 69 IN | BODY MASS INDEX: 36.84 KG/M2 | WEIGHT: 248.7 LBS

## 2021-06-03 VITALS — HEIGHT: 68 IN | WEIGHT: 252 LBS | BODY MASS INDEX: 38.19 KG/M2

## 2021-06-03 VITALS — HEIGHT: 69 IN | WEIGHT: 237 LBS | BODY MASS INDEX: 35.1 KG/M2

## 2021-06-03 VITALS — BODY MASS INDEX: 34.23 KG/M2 | WEIGHT: 231.1 LBS | HEIGHT: 69 IN

## 2021-06-03 VITALS — BODY MASS INDEX: 35.25 KG/M2 | HEIGHT: 69 IN | WEIGHT: 238 LBS

## 2021-06-03 VITALS — WEIGHT: 254.7 LBS | BODY MASS INDEX: 37.72 KG/M2 | HEIGHT: 69 IN

## 2021-06-03 VITALS — HEIGHT: 69 IN | BODY MASS INDEX: 36.58 KG/M2 | WEIGHT: 247 LBS

## 2021-06-03 VITALS — HEIGHT: 69 IN | BODY MASS INDEX: 36.26 KG/M2 | WEIGHT: 244.8 LBS

## 2021-06-03 VITALS — WEIGHT: 275.6 LBS | BODY MASS INDEX: 41.9 KG/M2

## 2021-06-03 NOTE — PATIENT INSTRUCTIONS - HE
Plan:  1. Great work on 20% total body weight reduction these past 6 months. You're on track. Continue your exercise regimen and look to find adventure to use this new body.  2. Continue your vitamin regimen, labs are excellent.   3. Follow up with dietician in 2 months.  4. Given sensitive stomach, consider iron protein succinylate or iron fumarate or iron gluconate.      [facility]  Nutritional Guidelines  Gastric Sleeve 6 Months Post Op    General Guidelines and Helpful Hints:    Eat 3 meals per day + protein supplement(s). No snacks between meals.  o Do not skip meals.  This can cause overeating at the next meal and will prevent adequate protein and nutritional intake.    Aim for 60-80 grams of protein per day.   o Always eat your protein first. This assists with optimal nutrition and helps you stay full longer.  o To achieve daily protein goals, it is recommended to drink approved protein supplement between meals.    Follow appropriate portion size: Use measuring cups to be accurate.    Months Post Op Portion Size per Meal   6 months 1/2 cup   7-8 months 1/2 - 2/3 cup   8 -9 months 2/3 - 3/4 cup   10-12 months 3/4 - 1 cup   12 months and beyond 1 cup maximum       Continue to use saucer/salad plates, infant/toddler silverware to keep portion sizes small and take small bites.    Eat S-L-O-W-L-Y to make each meal last 20-30 minutes. Always stop eating when satisfied.    Continue to use caution with foods containing skins, peels or membranes. Chew well!    Aim for 64 oz. of calorie-free fluids daily.  o Continue to avoid caffeine, carbonation and alcohol.  o Remember to avoid drinking during meals, 15-30 minutes before and 30 minutes after.    Aim for 30-60 minutes of physical activity most days of the week.    If having trouble tolerating meat, try using a crock-pot, tinfoil tent, steamer or other moist cooking method to create tender meats. Add broth or low-fat gravy to help meat stay moist.      Avoid high sugar and high fat foods to prevent high calorie intake. This will reduce your rate of weight loss.  o Check nutrition labels for less than 10 grams of sugar and less than 10 grams of fat per serving.    Continue Taking Vitamins/Minerals:  o 1000 mcg of Sublingual B-12 daily or at least 3 days weekly.  o 1 Multivitamin with Iron twice daily (chewable or swallow tabs)  o 500-600 mg Calcium Citrate three days weekly (chewable or swallow tabs)  o 5000 IU Vitamin D3 daily    Sample Grocery List    Protein:    Fat free Greek or light yogurt (less than 10 grams sugar)    Fat free or low-fat cottage cheese    String cheese or reduced fat cheese slices    Tuna, salmon, crab, egg, or chicken salad made with light or fat free mayonnaise    Egg or Egg Substitute    Lean/extra lean turkey, beef, bison, venison (ground, sirloin, round, flank)    Pork loin or tenderloin (grilled, baked, broiled)    Fish such as salmon, tuna, trout, tilapia, etc. (grilled, baked, broiled)    Tender cuts of lean (skinless) turkey or chicken    Lean deli meats: turkey, lean ham, chicken, lean roast beef    Beans such as kidney, garbanzo, black, verdugo, or low-fat/fat free refried beans    Peanut butter (natural preferred). Limit to 1 Tbsp. per day.    Low-fat meatloaf (made with lean ground beef or turkey)    Sloppy Joes made with low-sugar ketchup and lean ground beef or turkey    Soy or vegetable protein (i.e. vegan crumbles, soy/veggie burger, tofu)    Hummus    Vegetables:    Fresh: cooked or raw (as tolerated)    Frozen vegetables    Canned vegetables (low sodium or no salt added, rinse before cooking/eating)    (Ok to have skins/peels/membranes/seeds - just chew well)    Fruits:    Fresh fruit    Frozen fruit (no sugar added)    Canned fruit (packed in its own juice, NOT syrup)    (Ok to have skins/peels/membranes/seeds - just chew well)    Starch:    Unsweetened whole-grain hot cereal (or high fiber cold cereal, dry)    Toasted  whole wheat bread or Buffalo Thins    Whole grain crackers    Baked/boiled/mashed potato/sweet potato    Cooked whole grain pasta, brown rice, or other cooked whole grains    Starchy vegetables: corn, peas, winter squash    Protein Supplement:     Ready to drink protein shake with:  o 15-30 grams protein per serving  o Less than 10 grams total carbohydrate per serving     Protein powder mixed with:  o  Skim or 1% milk  o Low fat or fat free Lactaid milk, plain or no sugar added soymilk  o Water     Fats: (use in moderation)    1 teaspoon of soft tub margarine    1 teaspoon olive oil, canola oil, or peanut oil    1 tablespoon of low-fat ng or salad dressing     Sample Menu for 6 months after Gastric Sleeve    You do NOT need to eat/drink the full portion sizes listed below  Always stop when you are satisfied  Breakfast 6 Tbsp. 1% cottage cheese   2 Tbsp. peaches    Lunch 2 oz. lean hamburger or veggie burger  1-2 tsp. salsa   Supplement Approved Protein Shake   (Have between meals throughout the day)   Dinner 6 Tbsp. chicken breast  1-2 Tbsp. green beans     Breakfast 2 Eggs or   cup scrambled egg substitute product   Lunch 7 Tbsp. low-fat Sloppy Matthew mixture   2 high fiber crackers   Supplement Approved Protein Shake   (Have between meals throughout the day)   Dinner 6 Tbsp. grilled, broiled, or baked lemon pepper salmon  2 Tbsp. asparagus     Breakfast 6 Tbsp. light yogurt with 2 Tbsp. Grape Nuts or high fiber cereal    Lunch   cup of chili made with extra lean ground beef and kidney beans   Dinner 5 Tbsp. pork loin made in a crock pot  2 Tbsp. cooked broccoli  1 Tbsp. baked potato with 2 sprays of spray margarine    Supplement Approved Protein Shake   (Have between meals throughout the day)     Breakfast 6 Tbsp. lean ham  2 Tbsp. seedless melon   Lunch 7 Tbsp. diced turkey with 1 teaspoon low-fat gravy  1 Tbsp. green beans   Dinner 6 Tbsp. extra lean ground beef mixed with low sugar spaghetti sauce  2 Tbsp.  whole wheat pasta   Supplement Approved Protein Shake   (Have between meals throughout the day)     Breakfast 2 oz turkey or soy based sausage van    Lunch 6 Tbsp. low-fat cottage cheese  2 Tbsp. pineapple   Supplement Approved Protein Shake   (Have between meals throughout the day)   Dinner 7 Tbsp. beef tenderloin  1 Tbsp. asparagus     Breakfast 1/2 of a whole wheat English Muffin (toasted)  1 Tbsp. creamy natural peanut butter   Lunch   cup of black bean soup with 1 Tbsp. low fat shredded cheese   Dinner 7 Tbsp. low-fat turkey meat loaf   1 Tbsp. cooked carrots   Supplement Approved Protein Shake   (Have between meals throughout the day)     Breakfast 6 Tbsp. scrambled egg or scrambled egg substitute  1 Tbsp. finely chopped bell pepper  1 Tbsp. low fat shredded cheese   Lunch 7 Tbsp. lean diced ham  1 Tbsp. mandarin oranges   Supplement Approved Protein Shake   (Have between meals throughout the day)   Dinner 6 Tbsp. flaked fish   2 Tbsp. mashed sweet potato

## 2021-06-03 NOTE — PROGRESS NOTES
Bariatric Care Clinic Follow Up Visit for Previous Bariatric Surgery   Date of visit: 11/27/2019  Physician: Alfa Branch MD  Primary Care is Gela Hummel NP.  Donna Bolanos   34 y.o.  female    Date of Surgery: 5/7/19  Initial Weight: 270 lbs  Initial BMI: 40.8  Today's Weight:   Wt Readings from Last 1 Encounters:   11/27/19 215 lb 9.6 oz (97.8 kg)     Body mass index is 32.31 kg/m .  Initial Weight: 270 lbs  Weight: 215 lb 9.6 oz (97.8 kg)  Weight loss from initial: 54.4  % Weight loss: 20.15 %       Assessment and Plan   Assessment: Donna is a 34 y.o. year old female who is 6 months s/p  Sleeve Gastrectomy with Dr. Louis.  She has had a durable weight loss of 54 lbs since surgery.  Overall compliance with the [facility] Program has been excellent apart from being a bit limited in exercise variety/intensity due to some anal fistula issues/abscess problems the last few months. She's feeling well now. Previous iron deficiency anemia/low ferritin has improved and she's using vitamins well.   .  Donna ASHLIE Cici feels that she has achieved her   preoperative goals for bariatric surgery.    Plan:  Great work on 20% total body weight reduction these past 6 months. You're on track. Continue your exercise regimen and look to find adventure to use this new body.  2. Continue your vitamin regimen, labs are excellent.   3. Follow up with dietician in 2 months.  4. Given sensitive stomach, consider iron protein succinylate or iron fumarate or iron gluconate.      1. Postoperative malabsorption  iron succinyl-protein complex 40 mg/15 mL Liqd   2. Obesity, Class I, BMI 30-34.9     3. History of sleeve gastrectomy       Resolved Iron def anemia.    Return in about 6 months (around 5/27/2020), or 2 months w/ dietician..     Bariatric Surgery Review   Interim History/LifeChanges: few procedures for anal abscess. Upcoming wedding in May. 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    Patient Concerns: discussed vitamins/coffee and liver failure concerns and how it relates to alcohol abuse.    Medication changes: see list, completed Actigall.    Appetite (1-10): nothing \"out of the ordinary\". Some premeal hunger.     GERD sx at all? None.discused likely EGD in 2 years to assess status of esophagus, sooner if sx arise.    Vitamin Intake:   Multivitamin   yes   Vitamin D  yes   Calcium  yes   Vit. B-12    yes     Habits:            Alcohol Intake  rarely   NSAID Use  no   Caffeine Use  wants to start   Exercise  yes, bowflex machine/stair stepper in her home.   CPAP Use:  none.   Birth Control  IUD                  Symptoms  Hair Loss: No  Reactive Hypoglycemia: No  Abdominal Pain: No  Nausea: No  Heartburn: No  Constipation: No  Diarrhea: No  Trouble Breathing or Chest Pain: No  Leg Swelling: No  Skin rashes under folds: No                         LABS: \"Reviewed      LABS:  Lab Results   Component Value Date    WBC 5.0 11/21/2019    HGB 14.3 11/21/2019    HCT 43.1 11/21/2019    MCV 92 11/21/2019     11/21/2019      Lab Results   Component Value Date    JZATECCR92SL 42.9 11/21/2019    Lab Results   Component Value Date    HGBA1C 5.3 02/22/2019      Lab Results   Component Value Date    CHOL 161 11/21/2019    Lab Results   Component Value Date    PTH 42 11/21/2019         Lab Results   Component Value Date    FERRITIN 30 11/21/2019      Lab Results   Component Value Date    HDL 59 11/21/2019      Lab Results   Component Value Date    TKGEUHQO53 330 02/22/2019    No results found for: 82846   Lab Results   Component Value Date    LDLCALC 88 11/21/2019    Lab Results   Component Value Date    TSH 2.94 02/22/2019    Lab Results   Component Value Date    FOLATE 14.5 11/21/2019      Lab Results   Component Value Date    TRIG 68 11/21/2019    Lab Results   Component Value Date    ALT 14 11/21/2019    AST 21 11/21/2019    ALKPHOS 54 11/21/2019    BILITOT 0.9 11/21/2019    No results " "found for: TESTOSTERONE     No components found for: CHOLHDL No results found for: 7597   @[facility](vitamin a: 1)@          Patient Profile   Social History     Social History Narrative     Not on file        Past Medical History   Past Medical History:   Diagnosis Date     Obesity      PONV (postoperative nausea and vomiting)      Patient Active Problem List   Diagnosis     Anal fistula     Current Outpatient Medications   Medication Sig Note     calcium citrate-vitamin D (CITRACAL+D) 315-200 mg-unit per tablet take 3 tablets by oral route every day      cholecalciferol, vitamin D3, (CHOLECALCIFEROL) 1,000 unit tablet Take 2,000 Units by mouth daily.             cyanocobalamin, vitamin B-12, 1,000 mcg Subl Place 1,000 mcg under the tongue daily. Sublingual spray            FLUCELVAX QUAD 9413-1321, PF, 60 mcg (15 mcg x 4)/0.5 mL Syrg TO BE ADMINISTERED BY PHARMACIST FOR IMMUNIZATION      levonorgestrel (MIRENA) 20 mcg/24 hr (5 years) IUD 1 each by Intrauterine route.      magnesium 200 mg Tab take 2 tablets by oral route every day      multivit with min-folic acid 200 mcg Chew Chew 1 each daily. 11/27/2019: [facility] product. Doesn't have iron. Capsule. \"complete\".      multivitamin (ONE A DAY) per tablet take 1 tablet by oral route  every day with food      iron succinyl-protein complex 40 mg/15 mL Liqd 20 ml daily to supplement for postoperative malabsorption.        Past Surgical History  She has a past surgical history that includes pr lap, donta restrict proc, longitudinal gastrectomy (N/A, 5/7/2019); pr surg diagnostic exam, anorectal (N/A, 7/2/2019); and Incision and drainage perirectal abscess (N/A, 10/31/2019).     Examination   /68   Pulse 76   Resp 16   Ht 5' 8.5\" (1.74 m)   Wt 215 lb 9.6 oz (97.8 kg)   SpO2 99%   BMI 32.31 kg/m    Height: 5' 8.5\" (1.74 m) (11/27/2019  2:27 PM)  Initial Weight: 270 lbs (11/27/2019  2:27 PM)  Weight: 215 lb 9.6 oz (97.8 kg) (11/27/2019  2:27 PM)  Weight " loss from initial: 54.4 (11/27/2019  2:27 PM)  % Weight loss: 20.15 % (11/27/2019  2:27 PM)  BMI (Calculated): 32.3 (11/27/2019  2:27 PM)  SpO2: 99 % (11/27/2019  2:27 PM)  Waist Circumference (In): 45 Inches (2/4/2019  3:00 PM)  Hip Circumference (In): 50 Inches (2/4/2019  3:00 PM)  Neck Circumference (In): 14.5 Inches (2/4/2019  3:00 PM)  Body fat percentage: 50.6 (2/4/2019  3:00 PM)    General:  Alert and ambulatory,   HEENT:  No conjunctival pallor, moist mucous Membranes, neck is thin.  Pulmonary:  Normal respiratory effort, no cough, no audible wheezes/crackles.  CV:  Regular rate and Rhythm, no murmurs, pulses 2 plus  Abdominal: mildly obese, incisions well healed, non tender RUQ.   Extremities: no tremor or edema.  Skin:  No pallor or jaundice. Thinned hair.   Pscyh/Mood: happy with progress.         Counseling:   We reviewed the important post op bariatric recommendations:  -eating 3 meals daily  -eating protein first, getting >60gm protein daily  -eating slowly, chewing food well  -avoiding/limiting calorie containing beverages  -drinking water 15-30 minutes before or after meals  -limiting restaurant or cafeteria eating to twice a week or less    We discussed the importance of restorative sleep and stress management in maintaining a healthy weight.  We discussed the National Weight Control Registry healthy weight maintenance strategies and ways to optimize metabolism.  We discussed the importance of physical activity including cardiovascular and strength training in maintaining a healthier weight.  We discussed the importance of life-long vitamin supplementation and life-long  follow-up.    Donna was reminded that, to avoid marginal ulcers she should avoid tobacco at all, alcohol in excess, caffeine in excess, and NSAIDS (unless indicated for cardioprotection or othewise and opposed by a PPI).    At least 25 minutes was spent in direct consultation and over 50% of the time devoted to counseling regarding  maximizing the benefits of her previous bariatric surgery while minimizing risks of nutritional or structural complications.    Alfa Branch MD  [facility].  11/27/2019  2:55 PM

## 2021-06-04 VITALS — BODY MASS INDEX: 29.94 KG/M2 | WEIGHT: 199.8 LBS

## 2021-06-04 VITALS — WEIGHT: 210.7 LBS | BODY MASS INDEX: 31.21 KG/M2 | HEIGHT: 69 IN

## 2021-06-04 VITALS
WEIGHT: 215.6 LBS | RESPIRATION RATE: 16 BRPM | DIASTOLIC BLOOD PRESSURE: 68 MMHG | SYSTOLIC BLOOD PRESSURE: 120 MMHG | BODY MASS INDEX: 31.93 KG/M2 | HEART RATE: 76 BPM | HEIGHT: 69 IN | OXYGEN SATURATION: 99 %

## 2021-06-04 VITALS — BODY MASS INDEX: 31.84 KG/M2 | WEIGHT: 215 LBS | HEIGHT: 69 IN

## 2021-06-04 NOTE — ANESTHESIA POSTPROCEDURE EVALUATION
Patient: Donna Bolanos  EXAM UNDER ANESTHESIA, FISTULOTOMY  Anesthesia type: general    Patient location: Phase II Recovery  Last vitals:   Vitals Value Taken Time   /67 12/27/2019  1:45 PM   Temp 36.3  C (97.3  F) 12/27/2019  1:30 PM   Pulse 64 12/27/2019  1:44 PM   Resp 16 12/27/2019  1:30 PM   SpO2 100 % 12/27/2019  1:44 PM   Vitals shown include unvalidated device data.  Post vital signs: stable  Level of consciousness: awake and responds to simple questions  Post-anesthesia pain: pain controlled  Post-anesthesia nausea and vomiting: no  Pulmonary: unassisted, return to baseline  Cardiovascular: stable and blood pressure at baseline  Hydration: adequate  Anesthetic events: no    QCDR Measures:  ASA# 11 - Joan-op Cardiac Arrest: ASA11B - Patient did NOT experience unanticipated cardiac arrest  ASA# 12 - Joan-op Mortality Rate: ASA12B - Patient did NOT die  ASA# 13 - PACU Re-Intubation Rate: ASA13B - Patient did NOT require a new airway mgmt  ASA# 10 - Composite Anes Safety: ASA10A - No serious adverse event    Additional Notes:

## 2021-06-04 NOTE — ANESTHESIA CARE TRANSFER NOTE
Last vitals:   Vitals:    12/27/19 1254   BP: 131/63   Pulse: (!) 108   Resp: 16   Temp: 36.3  C (97.4  F)   SpO2: 100%     Pt brought to PACU on 10L facemask. Monitors applied. VSS upon arrival.    Patient's level of consciousness is drowsy  Spontaneous respirations: yes  Maintains airway independently: yes  Dentition unchanged: yes  Oropharynx: oropharynx clear of all foreign objects    QCDR Measures:  ASA# 20 - Surgical Safety Checklist: WHO surgical safety checklist completed prior to induction    PQRS# 430 - Adult PONV Prevention: 4558F - Pt received => 2 anti-emetic agents (different classes) preop & intraop  ASA# 8 - Peds PONV Prevention: NA - Not pediatric patient, not GA or 2 or more risk factors NOT present  PQRS# 424 - Joan-op Temp Management: 4559F - At least one body temp DOCUMENTED => 35.5C or 95.9F within required timeframe  PQRS# 426 - PACU Transfer Protocol: - Transfer of care checklist used  ASA# 14 - Acute Post-op Pain: ASA14B - Patient did NOT experience pain >= 7 out of 10

## 2021-06-04 NOTE — ANESTHESIA PREPROCEDURE EVALUATION
Anesthesia Evaluation      Patient summary reviewed   History of anesthetic complications (PONV after gastric sleeve, no issues with PONV since. )     Airway   Mallampati: II  Neck ROM: full   Pulmonary     breath sounds clear to auscultation  (+) a smoker  (-) pneumonia, asthma, shortness of breath, recent URI, sleep apnea                         Cardiovascular   Exercise tolerance: > or = 4 METS  (-) angina  ECG reviewed (incomplete RBBB, NSR)  Rhythm: regular  Rate: normal,         Neuro/Psych - negative ROS     Endo/Other    (+) obesity,   (-) no diabetes     GI/Hepatic/Renal            Dental    (+) caps                         Anesthesia Plan  Planned anesthetic: general endotracheal and total IV anesthesia  GETA  ASA 2   Induction: intravenous   Anesthetic plan and risks discussed with: patient  Anesthesia plan special considerations: antiemetics,   Post-op plan: routine recovery

## 2021-06-05 VITALS — WEIGHT: 184 LBS | HEIGHT: 69 IN | BODY MASS INDEX: 27.25 KG/M2

## 2021-06-05 NOTE — PROGRESS NOTES
"Post-op Surgical Weight Loss Diet Evaluation     Assessment:  Pt presents for 8 months post-op RD visit, s/p LSG on 5-17-19 with Dr. Louis. Today we reviewed current eating habits and level of physical activity, and instructed on the changes that are required for successful bariatric outcomes.    Patient Progress: Patient is frustrated with plateau, had three surgeries since sleeve due to anal fistula- unable to move the way she wants to  +we spent time discussing non scale victories as well as plateaus and how they are a normal part of the weight loss process    Pt's Initial Weight: 270 lbs  Weight: 210 lb 11.2 oz (95.6 kg)  Weight loss from initial: 59.3  % Weight loss: 21.96 %      Body mass index is 31.57 kg/m .     Concerns: inadequate water intake at times, lack of physical activity     Vitamins   Multi Vit with Iron: yes  Calcium Citrate: yes  B12: yes  D3: yes    Do you experience hunger? Yes- 3 hours after eating   Do you experience any reflux or discomfort with eating? No- only if not chewing food as small as possible  Nausea: no  Vomiting: no  Diarrhea: no  Constipation: no  Hair loss:no    Diet Recall/Time:   Breakfast: shilpa sanya egg sandwich with turkey sausage and cheese- 3/4 of it   Am Snack: orange  Lunch: roast beef, carrots, mushrooms and onions  Pm snack: protein bar (20g)  Dinner: chicken breast with salsa, black beans and corn, cheese sprinkled on top and hot sauce  HS Snack: none  +1/2 protein shake throughout the day- 21g    Proteins/Veg/Fruits/CHO (NOT well tolerated): milk is hard to drink, but can tolerate protein shakes    Estimated protein intake: 60 grams    Estimated portion size per meals:3/4 cup/meal    Incorporation of vegetables, fruits, carbohydrates into diet/meals using   Bariatric \"Plate Method\"   The patient and I discussed the importance of including lean/low fat protein at each meal, including a vegetable/fruit, and limiting carbohydrate intake to less than 25% of plate " "volume. Always keeping within approved perimeters of post op meal portion sizes according to 9 months post op guidelines.    Meal Duration:20 minutes  Encouraged slowing meal times down, 20-30 minutes, chewing to applesauce consistency.     Fluid-meal separation:  Fluids are  30min before and 30 minutes after meals.  The patient and I reviewed the anatomy of the bypass and why  fluids from a meal is so important.    Fluid Intake  Water: not so great, tracking now-48-50oz  +decaf tea    Discussed the importance of adequate hydration after surgery and the goal of 64+ oz of fluid daily.   The patient understands the importance of avoiding all carbonated, caffeinated, and sweetened drinks; and instead choosing 64oz plain water.    Exercise  Type: started exercising about 2 weeks ago- active at job, going to the gym 2-3 times per week    Pt's understands that 30-60 minutes of daily activity is an important part of bariatric surgery success.   Encouraged pt to incorporate strength training exercise along with cardiovascular exercise as well, most days of the week.      PES statement:    (NC-1.4) Altered GI Function related to Alteration in gastrointestinal tract structure and/or function/ Decreased functional length of the GI tract as evidenced by Weight loss of 21.9% initial body weight; sleeve gastrectomy    Intervention    Discussion  1. Discussed 9 Months  Post-Op Nutritional Guidelines for LSG  2. Recommended to consume 15-20gm protein at 3 meals daily, along with protein supplement/\"planned protein containing snack\" of 15-30gm protein, to reach goal of 60-80 gm protein daily.  3. Educated on post-op vitamin regimen: Multi Vit + iron 2x/day, calcium citrate 400-600 mg 2x/day, 0764-4541 mcg of Sublingual B-12 daily, and 5000 IU Vitamin D3 daily (MVI and calcium can be taken at the same time BID)  4. Reviewed lean protein sources  5. Bariatric Plate Method-  including lean/low fat protein at each " "meal, including a vegetable/fruit, and limiting carbohydrate intake to less than 25% of plate volume. Approved perimeters of post op meal portion sizes according to 9 months post op guidelines.  Instructions  1. Include 15-20gm protein at each meal, along with protein supplement/\"planned protein containing snack\" of 15-30gm protein, to reach goal of 60-80 gm protein daily.  2. Increase fluid intake to 64oz daily: choose plain or calorie/carbonation-free beverages.  3. Incorporate daily structured activity, 30-60 minutes most days of the week  4. Recommended pt to start taking: Multi Vit + iron 2x/day, calcium citrate 400-600 mg 2x/day, 7430-6803 mcg of Sublingual B-12 daily, and 5000 IU Vitamin D3 daily. (MVI and calcium can be taken at the same time)  5. Read food labels more consistently: keeping total fat grams <10, total sugar grams <10, fiber >3gm per serving.  6. Increase vegetable/fruit intake, by having a vegetable or fruit with each meal daily.  7. Practice plate method: 1/2 plate lean/low fat protein source, vegetable/fruit, <25% of plate complex carbohydrates.  8. Separate fluids 30 minutes before/after meal times.  9. Practice eating off of smaller plates/bowls, chewing to applesauce consistency, taking 20-30 minutes to eat in a calm/relaxed environment without distractions of tv/email/cell phone.    Handouts provided:  9 Months  Post-Op Nutritional Guidelines for LSG    Monitor/Evaluation    Pt to follow up for 1 year  post-op visit with bariatrician       Time In: 7:30a  Time Out: 8:00a      LARON signed: Yes    "

## 2021-06-06 NOTE — TELEPHONE ENCOUNTER
Prior Authorization Request  Who s requesting:  Pharmacy  Pharmacy Name and Location: [facility]  Medication Name: Phentermine  Insurance Plan: [employer]  Insurance Member ID Number:  [id number]  CoverMyMeds Key: N/A  Informed patient that prior authorizations can take up to 10 business days for response:   Yes  Okay to leave a detailed message: Yes

## 2021-06-06 NOTE — TELEPHONE ENCOUNTER
Central PA team  [phone]  Pool: HE PA MED ([id number])          PA has been initiated.       PA form completed and faxed insurance via Cover My Meds     Key:  [id number]     Medication:  Phentermine 37.5    Insurance:  [employer]        Response will be received via fax and may take up to 5-10 business days depending on plan

## 2021-06-08 NOTE — TELEPHONE ENCOUNTER
RN triage   Call from pt   Pt states she has fasting labs scheduled this AM and a couple hours ago took tylenol and a sip of water -- pt wants to know if she can still get her labs drawn  Advised to let lab know when getting labs drawn the time she took the tylenol.  Patti Goss RN [facility] RN triage      Reason for Disposition    Information only question and nurse able to answer    Protocols used: NO PROTOCOL AVAILABLE - INFORMATION ONLY-A-[place]

## 2021-06-08 NOTE — PROGRESS NOTES
Got a call from [facility] saying that they are only a hospital based pharmacist that can't fill the Phentermine.  Patient would prefer it to go to the [facility].  Please transfer this prescription.    Thanks,   Soledad Mccollum RN

## 2021-06-08 NOTE — TELEPHONE ENCOUNTER
Called pt to figure out which pharmacy she would like the script sent to and she would like to try the [facility] pharmacy in [place]. I let her know that I will send a message to  and let her know what he says. Pt verbalized understanding.    Sheryl Watkins RN, CBN  [facility]  P [phone]  F [phone]

## 2021-06-08 NOTE — TELEPHONE ENCOUNTER
Pt called and can not  her Phentermine prescription due to the pharmacy being shut down. Pharmacy told her to contact provider to figure out where she can go instead and have prescription sent there.   [phone]  **ok to leave a detailed message

## 2021-06-08 NOTE — PROGRESS NOTES
"B[facility] Care Clinic Follow Up Visit for Previous Bariatric Surgery   Date of visit: 5/6/2020  Physician: Alfa Branch MD  Primary Care is Gela Hummel NP.  Donna Bolanos   35 y.o.  female  8:23 AM telephone start. End call at 9:00am. Total call 35 minutes.  Date of Surgery: 5/7/19  Initial Weight: 270 lbs  Initial BMI: 40.8  Today's Weight:   Wt Readings from Last 1 Encounters:   05/06/20 199 lb 12.8 oz (90.6 kg)     Body mass index is 29.94 kg/m .  Initial Weight: 270 lbs  Weight: 199 lb 12.8 oz (90.6 kg)  Weight loss from initial: 70.2  % Weight loss: 26 %  Height: 5' 8.5\" (1.74 m) (1/28/2020  7:00 AM)  Initial Weight: 270 lbs (5/6/2020  8:13 AM)  Weight: 199 lb 12.8 oz (90.6 kg) (5/6/2020  8:13 AM)  Weight loss from initial: 70.2 (5/6/2020  8:13 AM)  % Weight loss: 26 % (5/6/2020  8:13 AM)  BMI (Calculated): 31.6 (1/28/2020  7:00 AM)  SpO2: 100 % (12/27/2019  2:00 PM)         Assessment and Plan   Assessment: Donna is a 35 y.o. year old female who is one year s/p  Sleeve Gastrectomy with Dr. Louis.  She has had a durable weight loss of  lbs since surgery.  Overall compliance with the [facility] has been excellent and has been using phentermine as well these last 2 months w/ good success and an additional reported 5% weight reduction from starting the 210 weight at her last dietician visit. Overall, doing well w/ 26% total body weight reduction, one year out and has just dipped her BMI below the obesity range at 199 lbs that she reports today.  Tolerating her phentermine well, using her vitamins well and plans to get her nutritional labs done this month to check if any further adjustments are needed. She's taking her iron separately from her chiropractor rx'd multivitamin that she prefers to use.     She has mild, infrequent GERD sx at night and uses prn omeprazole with good effect. We will plan for EGD between year 2 and 3 post op if sx remain mild/sporadic to check for " any silent reflux damage.    Exercise is good w/ interval training on her Bowflex machine. .  .  Donna Bolanos feels that she has achieved her   preoperative goals for bariatric surgery.    Plan:  1.  Great work, continue good bariatric method protein and water intake.  2. Phentermine can be refilled with 90 tabs next time, I'd recommend staying on half a tablet for now but it's most economical to fill 90 tabs for her.  Don't use phentermine if sick/ill or needing cold medications/decongestants or if you get COVID or other flu like illnesses/colds.  3. Continue as needed omeprazole if sporadic sx, consider daily use if getting sx more frequently or Pepcid with Gaviscon for breakthrough sx if needed.  4. Check labs at your convenience this month:  [facility] clinic or the lab at our Clinic in [place] (([phone] to schedule).   5. Continue exercise/strengthening 3-4 days weekly or more.  6. Recheck for phentermine in 3 months to check blood pressure/heart rate.  7. Consider sleep clinic evaluation if continued disrupted sleep. Don't use phentermine closer than 10-12 hours before desired bedtime and hold phentermine if worsening sleep patterns.      1. Postoperative malabsorption  Comprehensive Metabolic Panel    HM2(CBC w/o Differential)    Vitamin D, Total (25-Hydroxy)    Parathyroid Hormone Intact    Lipid Profile    Thyroid Stimulating Hormone (TSH)    Vitamin A (Retinol), Serum or Plasma    Vitamin B12    Vitamin B1 (Thiamine), Whole Blood (VIT B1 WB)    Ferritin    Copper, Serum or Plasma    Zinc, Serum or Plasma    Magnesium    Folate, Serum       No follow-ups on file.     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    Patient Concerns: doing well, tolerating phentermine the last 2 months.    Medication changes: see list.     Appetite (1-10): using phentermine well.  Sometimes will have evening hunger  still, may have half a protein bar or smoothie. occ snacks.     GERD sx at all? Rarely, night time reflux. Will use omeprazole as needed rarely. Once every 2 weeks.    Vitamin Intake:   Multivitamin   twice daily   Vitamin D  yes   Calcium  couple days weekly.   Vit. B-12    yes, spray at least 3     Habits:            Alcohol Intake  occasional.    NSAID Use None.   Caffeine Use  one cup coffee daily. Protein shake inside.    Exercise  bowflex and PT   CPAP Use:  never.   Birth Control  IUD                  Symptoms  Hair Loss: Yes  Reactive Hypoglycemia: No  Abdominal Pain: No  Nausea: No  Heartburn: Yes  Constipation: Yes  Diarrhea: No  Trouble Breathing or Chest Pain: No  Leg Swelling: No  Skin rashes under folds: No                         LABS: ordered      LABS:  Lab Results   Component Value Date    WBC 5.0 11/21/2019    HGB 14.3 11/21/2019    HCT 43.1 11/21/2019    MCV 92 11/21/2019     11/21/2019      Lab Results   Component Value Date    SXJYRBUP96FM 42.9 11/21/2019    Lab Results   Component Value Date    HGBA1C 5.3 02/22/2019      Lab Results   Component Value Date    CHOL 161 11/21/2019    Lab Results   Component Value Date    PTH 42 11/21/2019         Lab Results   Component Value Date    FERRITIN 30 11/21/2019      Lab Results   Component Value Date    HDL 59 11/21/2019      Lab Results   Component Value Date    DRYOHZCX11 330 02/22/2019    No results found for: 34893   Lab Results   Component Value Date    LDLCALC 88 11/21/2019    Lab Results   Component Value Date    TSH 2.94 02/22/2019    Lab Results   Component Value Date    FOLATE 14.5 11/21/2019      Lab Results   Component Value Date    TRIG 68 11/21/2019    Lab Results   Component Value Date    ALT 14 11/21/2019    AST 21 11/21/2019    ALKPHOS 54 11/21/2019    BILITOT 0.9 11/21/2019    No results found for: TESTOSTERONE     No components found for: CHOLHDL No results found for: 7597   @ZAINA PHARMA(vitamin a: 1)@          Patient Profile  "  Social History     Social History Narrative     Not on file        Past Medical History   Past Medical History:   Diagnosis Date     Obesity      Patient Active Problem List   Diagnosis     Anal fistula     Current Outpatient Medications   Medication Sig Note     calcium citrate-vitamin D (CITRACAL+D) 315-200 mg-unit per tablet Take 2 tablets by mouth 2 (two) times a week.       cholecalciferol, vitamin D3, (VITAMIN D3) 50 mcg (2,000 unit) capsule Take 4,000 Units by mouth daily.       cyanocobalamin, vitamin B-12, 1,000 mcg Subl Place 1,000 mcg under the tongue every other day. Sublingual spray      ferrous gluconate 324 mg (37.5 mg iron) Tab Take 1 tablet by mouth daily. 5/6/2020: Most nights will take.     FLUCELVAX QUAD 9565-6299, PF, 60 mcg (15 mcg x 4)/0.5 mL Syrg TO BE ADMINISTERED BY PHARMACIST FOR IMMUNIZATION      levonorgestrel (MIRENA) 20 mcg/24 hr (5 years) IUD 1 each by Intrauterine route.      magnesium 200 mg Tab take 2 tablets by oral route every day 5/6/2020: Uses for mood periodically.     multivit with min-folic acid 200 mcg Chew Chew 1 each daily. (Patient taking differently: Chew 1 each 2 (two) times a day. ) 11/27/2019: [facility] product. Doesn't have iron. Capsule. \"complete\".      phentermine (ADIPEX-P) 37.5 mg tablet Half tablet daily.      traZODone (DESYREL) 50 MG tablet         Past Surgical History  She has a past surgical history that includes pr lap, donta restrict proc, longitudinal gastrectomy (N/A, 5/7/2019); pr surg diagnostic exam, anorectal (N/A, 7/2/2019); Incision and drainage perirectal abscess (N/A, 10/31/2019); and Anal fissurectomy (N/A, 12/27/2019).     Examination   Wt 199 lb 12.8 oz (90.6 kg)   BMI 29.94 kg/m    Height: 5' 8.5\" (1.74 m) (1/28/2020  7:00 AM)  Initial Weight: 270 lbs (5/6/2020  8:13 AM)  Weight: 199 lb 12.8 oz (90.6 kg) (5/6/2020  8:13 AM)  Weight loss from initial: 70.2 (5/6/2020  8:13 AM)  % Weight loss: 26 % (5/6/2020  8:13 AM)  BMI (Calculated): " 31.6 (1/28/2020  7:00 AM)  SpO2: 100 % (12/27/2019  2:00 PM)    General:  Alert, normal speech/cognition and mood on phone visit.       Counseling:   We reviewed the important post op bariatric recommendations:  -eating 3 meals daily  -eating protein first, getting >60gm protein daily  -eating slowly, chewing food well  -avoiding/limiting calorie containing beverages  -drinking water 15-30 minutes before or after meals  -limiting restaurant or cafeteria eating to twice a week or less    We discussed the importance of restorative sleep and stress management in maintaining a healthy weight.  We discussed the National Weight Control Registry healthy weight maintenance strategies and ways to optimize metabolism.  We discussed the importance of physical activity including cardiovascular and strength training in maintaining a healthier weight.  We discussed the importance of life-long vitamin supplementation and life-long  follow-up.    Donna was reminded that, to avoid marginal ulcers she should avoid tobacco at all, alcohol in excess, caffeine in excess, and NSAIDS (unless indicated for cardioprotection or othewise and opposed by a PPI).    At least 25 minutes was spent in direct phone consultation and over 50% of the time devoted to counseling regarding maximizing the benefits of her previous bariatric surgery while minimizing risks of nutritional or structural complications.    Alfa Branch MD  [facility].  5/6/2020  8:22 AM

## 2021-06-08 NOTE — PATIENT INSTRUCTIONS - HE
Plan:  1.  Great work, continue good bariatric method protein and water intake.  2. Phentermine can be refilled with 90 tabs next time, I'd recommend staying on half a tablet for now but it's most economical to fill 90 tabs for her.  Don't use phentermine if sick/ill or needing cold medications/decongestants or if you get COVID or other flu like illnesses/colds.  3. Continue as needed omeprazole if sporadic sx, consider daily use if getting sx more frequently or Pepcid with Gaviscon for breakthrough sx if needed.  4. Check labs at your convenience this month:  [facility] clinic or the lab at our Clinic in [place] (([phone] to schedule).   5. Continue exercise/strengthening 3-4 days weekly or more.  6. Recheck for phentermine in 3 months to check blood pressure/heart rate.  7. Consider sleep clinic evaluation if continued disrupted sleep. Don't use phentermine closer than 10-12 hours before desired bedtime and hold phentermine if worsening sleep patterns.    [facility]  Nutritional Guidelines  Gastric Sleeve 12 Months Post Op    General Guidelines and Helpful Hints:    Eat 3 meals per day + protein supplement(s). No snacks between meals.  o Do not skip meals.  This can cause overeating at the next meal and will prevent adequate protein and nutritional intake.    Aim for 60-80 grams of protein per day.  o Always eat your protein first. This assists with optimal nutrition and helps you stay full longer.  o Depending on your portion size, you may need to drink approved protein supplement between meals to achieve protein goals. Follow recommendations of your Dietitian.     Eat your protein first, and then follow with fiber.   o It is not necessary to count your fiber, but 15-20 grams per day is recommended.    o Add fiber by including fruits, vegetables, whole grains, and beans.     Portions should be about 1 cup per meal. Use measuring cups to be accurate.    Continue to use saucer/salad  plates, infant/toddler silverware to keep portion sizes small and take small bites.    Eat [id number] to make each meal last 20-30 minutes. Always stop eating when satisfied.    Continue to use caution with foods containing skins, peels or membranes. Chew well!    Aim for 64 oz. of calorie-free fluids daily.  o Continue to avoid caffeine and carbonation. If you choose to drink alcohol, do so in moderation.   o Remember to avoid drinking during meals, 15-30 minutes before and 30 minutes after.    Exercise is sterling for continued weight loss and weight maintenance. Aim for 30-60 minutes of physical activity most days of the week. Include cardiovascular and strength training.    If having trouble tolerating meat, try using a crock-pot, tinfoil tent, steamer or other moist cooking method to create tender meats. Add broth or low-fat gravy to help meat stay moist.     Avoid high sugar and high fat foods to prevent high calorie intake. This will reduce your rate of weight loss and can cause weight regain.   o Check nutrition labels for less than 10 grams of sugar and less than 10 grams of fat per serving.    Continue Taking Vitamins/Minerals:  o 1000 mcg of Sublingual B-12 daily (at least 3 days weekly minimum)  o 1 Multivitamin with Iron twice daily (chewable or swallow tabs)  o 500-600 mg Calcium Citrate twice weekly(chewable or swallow tabs)  o 4000 IU Vitamin D3 daily     Sample Grocery List    Protein:    Fat free Greek or light yogurt (less than 10 grams sugar)    Fat free or low-fat cottage cheese    String cheese or reduced fat cheese slices    Tuna, salmon, crab, egg, or chicken salad made with light or fat free mayonnaise    Egg or Egg Substitute    Lean/extra lean turkey, beef, bison, venison (ground, sirloin, round, flank)    Pork loin or tenderloin (grilled, baked, broiled)    Fish such as salmon, tuna, trout, tilapia, etc. (grilled, baked, broiled)    Tender cuts of lean (skinless) turkey or chicken    Lean  deli meats: turkey, lean ham, chicken, lean roast beef    Beans such as kidney, garbanzo, black, verdugo, or low-fat/fat free refried beans    Peanut butter (natural preferred). Limit to 1 Tbsp. per day.    Low-fat meatloaf (made with lean ground beef or turkey)    Sloppy Joes made with low-sugar ketchup and lean ground beef or turkey    Soy or vegetable protein (i.e. vegan crumbles, soy/veggie burger, tofu)    Hummus    Vegetables:    Fresh: cooked or raw (as tolerated)    Frozen vegetables    Canned vegetables (low sodium or no salt added, rinse before cooking/eating)    (Ok to have skins/peels/membranes/seeds - just chew well)    Fruits:    Fresh fruit    Frozen fruit (no sugar added)    Canned fruit (packed in its own juice, NOT syrup)    (Ok to have skins/peels/membranes/seeds - just chew well)    Starch:    Unsweetened whole-grain hot cereal (or high fiber cold cereal, dry)    Toasted whole wheat bread or [place] Thins    Whole grain crackers    Baked/boiled/mashed potato/sweet potato    Cooked whole grain pasta, brown rice, or other cooked whole grains    Starchy vegetables: corn, peas, winter squash    Protein Supplement:     Ready to drink protein shake with:  o 15-30 grams protein per serving  o Less than 10 grams total carbohydrate per serving     Protein powder mixed with:  o  Skim or 1% milk  o Low fat or fat free Lactaid milk, plain or no sugar added soymilk  o Water     Fats: (use in moderation)    1 teaspoon of soft tub margarine    1 teaspoon olive oil, canola oil, or peanut oil    1 tablespoon of low-fat ng or salad dressing     Sample Menu for 12 months after Gastric Sleeve    You do NOT need to eat/drink the full portion sizes listed below  Always stop when you are satisfied    Breakfast   cup 1% cottage cheese     cup diced peaches   Lunch   slice whole grain bread/toast with 1 tsp. light ng  2 oz. sliced lean turkey, ham, or chicken    cup carrots   Supplement Approved protein supplement (as  needed between meals)   Dinner   cup 93% lean ground beef mixed with 2 Tbsp. marinara sauce     cup green beans    cup whole grain pasta     Breakfast   cup egg substitute or 2 egg whites, scrambled   1 oz low fat shredded cheese    cup sautéed chopped vegetables mixed in   Lunch 1 cup chili made with lean ground beef or turkey   Supplement 6 oz light Greek yogurt (as needed)   Dinner 3 oz  grilled, broiled, or baked lemon pepper salmon  2 Tbsp. grilled asparagus  2 Tbsp. baked sweet potato     Breakfast   cup whole grain oatmeal made with skim milk and unflavored protein powder added    cup blueberries       Lunch 3 oz  meatloaf made with lean ground turkey    cup steamed broccoli   Dinner 3 oz pork loin made in a crock pot seasoned with a spice rub    cup cooked carrots   Supplement Approved protein supplement (as needed between meals)     Breakfast   cup egg scramble made with egg substitute and turkey sausage    whole grain English muffin with 1 teaspoon low sugar jelly   Lunch 3 oz seasoned, skinless grilled chicken     cup grilled vegetables   Dinner 2 oz lean beef    cup brown rice    cup strawberries   Supplement 1 string cheese (as needed)     Breakfast 6 ounces light Greek yogurt    cup unsweetened mixed berries   Lunch 3 oz shrimp, with low-sugar cocktail sauce for dipping    cup pea pods   Supplement   cup fat free cottage cheese (as needed)   Dinner 3 oz tender turkey breast (made in crock pot for extra moisture)    of a small whole wheat dinner roll     Breakfast     cup low fat cottage cheese    cup strawberries   Lunch   cup black bean soup  4 whole grain crackers   Supplement 1 cup skim milk with scoop of protein powder added (as needed)   Dinner 3 oz. grilled tilapia with lemon pepper seasoning    cup grilled bell peppers     Breakfast 2 ounces turkey sausage van    whole wheat English muffin   Supplement Approved protein supplement (as needed between meals)   Lunch 3 oz lean ham, turkey, or  chicken     cup tomatoes   Dinner 2 oz. sirloin steak    cup mixed vegetables    cup brown rice     Exercise Guidance    Nearly everything that bothers us gets better when the proper amount of exercise can be done in the proper amounts.  Getting to that level safely and without injury is the key.  When it comes to weight loss, exercise is especially important in maintaining the weight loss.  Unfortunately, one of the harsh realities is that substantial weight loss slows our metabolism, often anywhere from 5-20%.    Our brain always remembers our heaviest weight and we can return to that if we're not mindful and moving regularly.  Our biology doesn't understand the concept of having too much energy, only not having enough.  As such, when we lose weight, it's thought that the brain interprets this as we're ill or in a famine and dials back our metabolism to limit further weight loss.  This is why exercise is so important in keeping the weight off and is the main reason people have some weight regain from their low weight point after weight loss.  We have to make up that 10-20% of calories not being burned.Since we can restrict our intake for only so long, exercise becomes very important in our long term healthy weigh maintenance to balance out the occasional indiscretion with our diet.    Generally, for every 5% body weight reduction in a weight loss season, a person needs to add  kilocalories of exercise in their daily routine to keep that weight off for the long term.  This is why it's vital to be starting your fitness regimen during weight loss season, so that routine is well established as you move into your maintenance period.    Additionally, all sorts of good enzymes and genes turn on with exercise and our stress, sleep, mood and bodies feel better when we can get to the point of making ourselves a little sweaty and short of breath 35-50 minutes most days of the week. But we have to start with what we can  "do first and give ourselves permission to work our way up to this goal.    Who isn't ready for exercise? Well, if you get severe dizziness/palpitations, chest pain or short of breath/faint with even minimal activity like walking across a room or you're having to pause while going up a flight of stairs, then getting your heart and/or lungs fully evaluated prior to starting an exercise regimen is recommended. Everyone else can probably start a program, but everyone may start at a different point:  Some can set a 5-10 minute walking goal and others will be able to ride their bike for an hour.      Start with where you're at and look to add 10% more each week until you're at that 150 minutes or more a week (or 75 minutes/week or more of vigorous exercise). Moderate exercise can be estimated as the pace you can carry on a conversation and vigorous is the pace at which you can get 3-5 words out before having to take a breath.  If you're using heart rate monitoring, Moderate is about 60% of your maximum heart rate and vigorous about 75%. (Max heart rate estimated as 220 beats minus your age:  Example: 220-age of 44 =176 Beats per minute (BPM) maximum. 0.6X 176= 105 BPM (moderate), 132 BMP(vigorous)).    If you like to count steps, the 10,000 steps per day does correlate well with weight maintenance but try to make at least 20-25% of those steps at a brisk pace (like you are about to miss your bus).    Finally, if you are pressed for time, it's important to know that some exercise is better than none.  High Intensity Interval training (HIIT) is a good way to get as much out of a short period of working out. If you can't walk, use the stairs, bike or swim; you could use a punching/arm workout regimen for your activity.  The idea with HIIT is to have a 3-6 minute warm up period of low intensity and the 3-6 \"intervals\" where you push the intensity up and then recover and start the next interval. One study showed that 3 " "intervals of 20 seconds at \"Maximum Effort\" while either biking on a stationary bike or going up stairs and then having 100 seconds recovery time before the next Maximum Effort was equally as beneficial on cardiovascular fitness development as doing 30 minutes of moderately paced walking 3 days weekly over a 6 week period of time.  So intensity matters. You just need to be able to safely do your desired exercise without injury. There are many great HIIT exercises/routines out there. IF you're not doing much exercise currently, I recommend giving your self 2-3 weeks of moderate exercise, 3 days weekly minimum to get your bones/tendons/muscles used to exercise before going for High Intensity workouts.    If you like to use Apps on the phone, the couch to 5k martinez and 7 minute workout apps are nice places to start if you are reasonably healthy.  There are hundreds of other options out there.  Consider viewing [employer] if gentler exercise/movement is desired. Videos on Willie Chi and chair yoga for seniors exist and are free. Check them out and let's get that 3-4 days a week routine going.    Let's move!  Alfa Branch MD.     Information about the Weight Loss Medication Phentermine    When combined with a commitment to diet and behavior changes, weight loss medications can be a nice additional tool to maximize your weight loss season.  There are no magic pills and without diet and behavior changes, weight loss will be minimal.  Think of this medication as a tool to make your diet and behavior changes easier and you'll enjoy a higher probability of success.  Remember not to skip meals, but use this medication to tolerate your reduced calories more easily.  If you are very hungry in the evenings, you are likely not eating enough in the first 10 hours of your day and need to focus on getting your protein requirements in at each of your 3 daily meals.      Phentermine is a stimulant medication related to the amphetamine class of " "medication but with a lower risk of dependence and addiction.  It is used for weight loss by suppressing the appetite region of the brain.  It also may speed up the metabolic rate and give a person more energy.  Like any medication there are potential side effects and the most common are:  Dry mouth occurs in almost everyone (hydrate well), fewer people experience Palpatations, fast heart rate, elevation of blood pressure, restlessness, insomnia, dizziness, change in mood, tremor, headache, changes in bowel movements,itchiness, changes in sex drive.    Some people can develop serious side effects which include:  Heart strain (\"ischemia\").  Tachycardia (fast heart rate or irregular heart rate).  Hypertension  Pulmonary Hypertension  Psychosis  Dependency and abuse has occurred in some.  If you've been on high dose (37.5mg) for long periods, phentermine should be tapered down over a few weeks before abruptly stopping as seizures have been reported rarely.    We do not recommend taking it in combination with the following medications due to potential drug interactions which can increase the risk of side effects and/or potential for seizures:    Absolutely contraindicated are:  Amphetamines or other stimulants like ADHD medication: (dextroamphetamine, amphetamine, diethylpropion, isocarboxazid, methamphetamine, lisdexamfetamine, benzphetamine,dexmethylphenidate, methylphenidate, selegiline patch, sibutramine, tranylcypromine.    Avoid use with:   Dopamine, dobutamine, ephedra, ephedrine, epinephrine, isoproterenol, linezolid, norepinephrine, phenylephrine injection, venlafaxine (Effexor).    Monitor or modify dose with:  Acebutolol, atenolol, betaxolol, bisoprolol, carvedilol, droxidopa, esmolol, labetalol, magnesium citrate, metoprolol, nadolol, nebivolol, penbutolol, pindolol, propranolol, sotalol, timolol.    Caution with: armodafinil,betaxolol eye drops, brexpiprazole, bupropion, busulfan, caffeine, carteolol drops, " enzalutaminde, ginseng, green tea, guarana, levobunolol drops, lindane cream, modafinil, afrin nasal spray (oxymetazoline), pamabrom, phenylephrine oral and nasal spray, pseudoephedrine (sudafed), rasgiline, sleegiline, bowel prep, tiagabine, timolol drops,  TRAMADOL due to increased risk of seizures.    The current cheapest place to fill your prescription is at [facility], [facility] or [facility] and is around $30 for 90 tablets.  Occasionally, [employer] and [facility] have price matched, so call around and get the best price for you.  Other pharmacies may charge closer to$70- $100 for the same prescription. You don't have to be a member to use the pharmacy at [facility] currently.  An alternative some patients have tried is using a voucher system through [employer].  $25 paid on their website gets you a  voucher that can allows you to pick your meds up at [facility] without paying anything more.    Dosing:  We start with half a tablet for the first 2 weeks and if tolerating it without problems, you can take up to one full tablet daily in the morning after breakfast.  For those with evening hunger problems, sometimes half a tablet in the morning and half a tablet around 1-2 pm can be effective, however, risks of nighttime insomnia/restless increase with afternoon dosing so call me at the clinic if considering this regimen or having any issues.  You only have to use the amount effective for you, not to exceed one full tablet.  It can also be used situationaly and does not have to be taken every day. As always, if any questions give us a call at the [facility] telephone:  [phone].      Simple measures to decrease reflux:  1. Avoid Carbonation  2. Low carbohydrate diet  3. Avoid the following:  Chocolate, tomatoes, citrus, carbonated beverages, peppermint, onions/garlic, high fat meals  4. If not much relief from Omeprazole/Prilosec, consider trying Gaviscon   5. Avoid alcohol, especially white wine.  6. Sleep on  your left side if possible.  7. Try not to have meals within 3 hrs of going to sleep.          Try to de-stress, relaxation breathing techniques, aerobic exercise, hypno-therapy and acupuncture can be helpful.    Weight Loss can improve reflux immensely.

## 2021-06-09 ENCOUNTER — AMBULATORY - HEALTHEAST (OUTPATIENT)
Dept: SURGERY | Facility: CLINIC | Age: 36
End: 2021-06-09

## 2021-06-09 DIAGNOSIS — Z90.3 HISTORY OF SLEEVE GASTRECTOMY: ICD-10-CM

## 2021-06-09 DIAGNOSIS — F19.90 EXCESSIVE USE OF NONSTEROIDAL ANTI-INFLAMMATORY DRUG (NSAID): ICD-10-CM

## 2021-06-09 NOTE — TELEPHONE ENCOUNTER
Prior Authorization Request  Who s requesting:  Pharmacy  Pharmacy Name and Location: [facility]  Medication Name: Phentermine  Insurance Plan: [employer]  Insurance Member ID Number:  [id number]  CoverMyMeds Key: [id number]  Informed patient that prior authorizations can take up to 10 business days for response:   Yes  Okay to leave a detailed message: Yes

## 2021-06-09 NOTE — TELEPHONE ENCOUNTER
Central PA team  [phone]  Pool: HE PA MED ([id number])          PA has been initiated.       PA form completed and faxed insurance via Cover My Meds     Key:  [id number]      Medication:  Phentermine HCl 37.5MG tablets      Insurance:  [employer]        Response will be received via fax and may take up to 5-10 business days depending on plan

## 2021-06-10 NOTE — TELEPHONE ENCOUNTER
responded to pt.    Sheryl Watkins RN, CBN  [facility]  P [phone]  F [phone]

## 2021-06-11 NOTE — PROGRESS NOTES
Omeprazole script changed to [employer].    Sheryl Watkins RN, CBN  [facility]  P [phone]  F [phone]

## 2021-06-13 NOTE — TELEPHONE ENCOUNTER
18 most post op lab orders placed for patient in preparation for appointment with  in December.  would just like to check her vitamin B12, Thiamine, Ferritin, vitamin D, CMP and CBC at this time.    Sheryl Watkins RN, CBN  [facility]  P [phone]  F [phone]

## 2021-06-13 NOTE — TELEPHONE ENCOUNTER
Pt has 18 mo post op on 12/23.  Feels good, but if you want labs, she'll do them at [facility].

## 2021-06-14 NOTE — PROGRESS NOTES
"Donna Bolanos is a 35 y.o. female who is being evaluated via a billable video visit.      The patient has been notified of following:     \"This video visit will be conducted via a call between you and your physician/provider. We have found that certain health care needs can be provided without the need for an in-person physical exam.  This service lets us provide the care you need with a video conversation.  If a prescription is necessary we can send it directly to your pharmacy.  If lab work is needed we can place an order for that and you can then stop by our lab to have the test done at a later time.    Video visits are billed at different rates depending on your insurance coverage. Please reach out to your insurance provider with any questions.    If during the course of the call the physician/provider feels a video visit is not appropriate, you will not be charged for this service.\"    Patient has given verbal consent to a Video visit? Yes  How would you like to obtain your AVS? AVS Preference: E-Mail (Inform patient AVS not encrypted with this option).  If dropped by the video visit, the video invitation should be sent to: Text to cell phone: [phone]  Will anyone else be joining your video visit? No        Video Start Time: 10:30 AM    Additional provider notes: a    [facility] Follow Up Visit for Previous Bariatric Surgery   Date of visit: 12/23/2020  Physician: Alfa Branch MD  Primary Care is Gela Hummel NP.  Donna Bolanos   35 y.o.  female    Date of Surgery: 5/7/19  Initial Weight: 270 lbs  Initial BMI: 40.8  Today's Weight:   Wt Readings from Last 1 Encounters:   12/23/20 184 lb (83.5 kg)     Body mass index is 27.57 kg/m .  Initial Weight: 270 lbs  Weight: 184 lb (83.5 kg)  Weight loss from initial: 86  % Weight loss: 31.85 %       Assessment and Plan   Assessment: Donna is a 35 y.o. year old female who is 18 months s/p  Sleeve Gastrectomy with Dr. Louis.  She has had a " "durable weight loss of 86 lbs since surgery.  Overall compliance with the [facility] Program has been excellent. Labs are great, habits good apart from occ NSAID exposure for neck pain and ramping up exercise more. Phentermine use is appropriate and helpful for her so we'll continue therapy this winter..  .  Donna Bolanos feels that she has achieved her   preoperative goals for bariatric surgery.  Plan:  1. Iron can switch to fumarate form from [employer]. Once weekly. Continue ultra solo bariatric advantage and you can hold any extra B12.  2. Continue omeprazole.If desire to taper let me know.  3. Voltaren gel trial for neck pain to limit oral NSAID exposure risks to your stomach. Once or twice daily. Consider acupuncture for non medicinal relief or [facility] for therapy.  4. Phentermine refilled, recheck with me in 6 months, sooner if struggling/questions. No need for daily use but can use half tab daily for now if tolerated. Don't use if sick/ill or using other stimulants or cold medications.  5. Follow up with dietician if any questions arise. Continue to chew thoroughly to decrease \"plugged\" feelings and separate beverage from meals by 30 minutes ideally.   6. Recheck at 2 year visit. We'll look an screening endoscopy for stomach health in 1 year or so if all else stays the same.    No diagnosis found.    No follow-ups on file.     Bariatric Surgery Review   Interim History/LifeChanges: stable. Some neck pain issues the last few months.    Patient Concerns: doing well overall. No GERD sx and using PPI once daily still. .    Medication changes: no..    Any Hunger/Appetite issues?  Doing well w/ combination therapy of sleeve and phentermine..    GERD sx at all? PPI ongoing, doing well. Follow up Endoscopy to evaluate health of esophagus/stomach in one year. Typically, EGD is recommended within the first 3 years following surgery and periodically for surveillance " "thereafter or for symptom evaluation as needed.    Vitamin Intake:   Multivitamin   yes.   Vitamin D  4000IUs.   Calcium  twice weekly.   B12   1000mcg 2-3 days weekly, bariatric advantage ultrasolo SL.   Extra Supplments? Iron gluconate will try fumarate to decrease irritation.     Habits:            Sleep Insomnia sometimes, 7 hours per night, bedtime uses sleep tracker.   Nicotine Use? no.   Alcohol Intake  occasional. .   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  Rare use since for neckshoulder pain..   Caffeine Use  cup in AM, w/ premiere protein for .   Aerobic Exercise?  \"taking it easy\" . Has bowflex  available.  occ yoga.   Strength Training? Has PT. Lots of chasing 1.5 year old and walking dog for 20-45 minutes in morning.   Birth Control  good..   Bone Density Follow up:  With an American [employer] Fracture Risk Calculator score of Moderate or High, we would recommend DXA scan per our protocol. A Low risk for 45-54 year old can defer DXA scan until age 55 barring extenuating circumstances.  Over 55 years old, we would recommend check between year 2 and 3 post bariatric surgery.                                  LABS: \"Reviewed      LABS:  Lab Results   Component Value Date    WBC 5.2 12/15/2020    HGB 14.1 12/15/2020    HCT 41.4 12/15/2020    MCV 96 12/15/2020     12/15/2020      Lab Results   Component Value Date    UOMGQMPO48NL 54.3 12/15/2020    Lab Results   Component Value Date    HGBA1C 5.3 02/22/2019      Lab Results   Component Value Date    CHOL 184 05/15/2020    Lab Results   Component Value Date    PTH 58 05/15/2020         Lab Results   Component Value Date    FERRITIN 112 12/15/2020      Lab Results   Component Value Date     05/15/2020      Lab Results   Component Value Date    WZTMISPT88 841 (H) 12/15/2020    No results found for: 28872   Lab Results   Component Value Date    LDLCALC 60 05/15/2020    Lab Results   Component Value Date    " TSH 2.59 05/15/2020    Lab Results   Component Value Date    FOLATE 9.5 05/15/2020      Lab Results   Component Value Date    TRIG 73 05/15/2020    Lab Results   Component Value Date    ALT 15 12/15/2020    AST 18 12/15/2020    ALKPHOS 63 12/15/2020    BILITOT 1.3 (H) 12/15/2020    No results found for: TESTOSTERONE     No components found for: CHOLHDL No results found for: 7597   @resusfast(vitamin a: 1)@          Patient Profile   Social History     Social History Narrative     Not on file        Past Medical History   Past Medical History:   Diagnosis Date     Obesity      Patient Active Problem List   Diagnosis     Anal fistula     History of sleeve gastrectomy     Postoperative malabsorption     Current Outpatient Medications   Medication Sig Note     calcium citrate-vitamin D (CITRACAL+D) 315-200 mg-unit per tablet Take 2 tablets by mouth 2 (two) times a week.       cholecalciferol, vitamin D3, (VITAMIN D3) 50 mcg (2,000 unit) capsule Take 4,000 Units by mouth daily.       cyanocobalamin, vitamin B-12, 1,000 mcg Subl Place 1,000 mcg under the tongue every other day. Sublingual spray      ferrous gluconate 324 mg (37.5 mg iron) Tab Take 1 tablet by mouth daily. 5/6/2020: Most nights will take.     FLUCELVAX QUAD 7315-0264, PF, 60 mcg (15 mcg x 4)/0.5 mL Syrg TO BE ADMINISTERED BY PHARMACIST FOR IMMUNIZATION      levonorgestrel (MIRENA) 20 mcg/24 hr (5 years) IUD 1 each by Intrauterine route.      multivitamin therapeutic tablet Bariatric advantage without iron      omeprazole (PRILOSEC) 20 MG capsule Take once daily while on brief course of NSAIDS following sleeve gastrectomy.      phentermine (ADIPEX-P) 37.5 mg tablet Half tablet daily.      Skins can irritate her stomach. Potato skins can plug her.   Past Surgical History  She has a past surgical history that includes pr lap, donta restrict proc, longitudinal gastrectomy (N/A, 5/7/2019); pr surg diagnostic exam, anorectal (N/A, 7/2/2019); Incision and drainage  "perirectal abscess (N/A, 10/31/2019); and Anal fissurectomy (N/A, 12/27/2019).     Examination   Ht 5' 8.5\" (1.74 m)   Wt 184 lb (83.5 kg)   BMI 27.57 kg/m    Height: 5' 8.5\" (1.74 m) (12/23/2020 10:00 AM)  Initial Weight: 270 lbs (12/23/2020 10:00 AM)  Weight: 184 lb (83.5 kg) (12/23/2020 10:00 AM)  Weight loss from initial: 86 (12/23/2020 10:00 AM)  % Weight loss: 31.85 % (12/23/2020 10:00 AM)  BMI (Calculated): 27.6 (12/23/2020 10:00 AM)  SpO2: 100 % (12/27/2019  2:00 PM)    General:  Alert and ambulatory,   HEENT:  No conjunctival pallor, moist mucous Membranes, neck is thin  Pulmonary:  Normal respiratory effort, no cough,   Abdominal: no rashes.   Extremities: no tremor  Skin:  No pallor.  Pscyh/Mood: happy with progress.         Counseling:   We reviewed the important post op bariatric recommendations:  -eating 3 meals daily  -eating protein first, getting >60gm protein daily  -eating slowly, chewing food well  -avoiding/limiting calorie containing beverages  -drinking water 15-30 minutes before or after meals  -limiting restaurant or cafeteria eating to twice a week or less    We discussed the importance of restorative sleep and stress management in maintaining a healthy weight.  We discussed the National Weight Control Registry healthy weight maintenance strategies and ways to optimize metabolism.  We discussed the importance of physical activity including cardiovascular and strength training in maintaining a healthier weight and help long term bone health.  We discussed the importance of life-long vitamin supplementation for avoiding malnourishment and related disease and the need for life-long  follow-up for maintenance of her bariatric tool.    Donna was reminded that, to avoid marginal ulcers she should avoid tobacco at all, alcohol in excess, caffeine in excess, and NSAIDS (unless indicated for cardioprotection or othewise and opposed by a PPI).    At least 25 minutes was spent in video  consultation " and over 50% of the time devoted to counseling regarding maximizing the benefits of her previous bariatric surgery, while minimizing risks of nutritional or structural complications.    Alfa Branch MD  [facility].  12/23/2020  10:30 AM           Video-Visit Details    Type of service:  Video Visit    Video End Time (time video stopped): 10:55  Originating Location (pt. Location): Home    Distant Location (provider location):  [facility] SURG[facility] AND BARIATRICS CARE [place]     Platform used for Video Visit: Dorita Rico, [facility]

## 2021-06-14 NOTE — PATIENT INSTRUCTIONS - HE
"Plan:  1. Iron can switch to fumarate form from [employer]. Once weekly. Continue ultra solo bariatric advantage and you can hold any extra B12.  2. Continue omeprazole.If desire to taper let me know.  3. Voltaren gel trial for neck pain to limit oral NSAID exposure risks to your stomach. Once or twice daily. Consider acupuncture for non medicinal relief or [facility] for therapy.  4. Phentermine refilled, recheck with me in 6 months, sooner if struggling/questions. No need for daily use but can use half tab daily for now if tolerated. Don't use if sick/ill or using other stimulants or cold medications.  5. Follow up with dietician if any questions arise. Continue to chew thoroughly to decrease \"plugged\" feelings and separate beverage from meals by 30 minutes ideally.   6. Recheck at 2 year visit. We'll look an screening endoscopy for stomach health in 1 year or so if all else stays the same.      [facility]  Nutritional Guidelines  Gastric Sleeve 18 Months Post Op and Beyond    General Guidelines and Helpful Hints:    Eat 3 meals per day + protein supplement(s). No snacks between meals.  o Do not skip meals.  This can cause overeating at the next meal and will prevent adequate protein and nutritional intake.    Aim for 60-80 grams of protein per day.  o Always eat your protein first. This assists with optimal nutrition and helps you stay full longer.  o Depending on your portion size, you may need to drink approved protein supplement between meals to achieve protein goals. Follow recommendations of your Dietitian.     Eat your protein first, and then follow with fiber.   o It is not necessary to count your fiber, but 15-20 grams per day is recommended.    o Add fiber by including fruits, vegetables, whole grains, and beans.     Portions should remain about 1 cup per meal. Use measuring cups to be accurate.    Continue to use saucer/salad plates, infant/toddler silverware to keep " portion sizes small and take small bites.    Eat S-[id number]-Y to make each meal last 20-30 minutes. Always stop eating when satisfied.    Continue to use caution with foods containing skins, peels or membranes. Chew well!    Aim for 64 oz. of calorie-free fluids daily.  o Continue to avoid caffeine and carbonation. If you choose to drink alcohol, do so in moderation.   o Remember to avoid drinking during meals, 15-30 minutes before and 30 minutes after.    Exercise is sterling for continued weight loss and weight maintenance. Aim for 30-60 minutes of physical activity most days of the week. Include cardiovascular and strength training.    If having trouble tolerating meat, try using a crock-pot, tinfoil tent, steamer or other moist cooking method to create tender meats. Add broth or low-fat gravy to help meat stay moist.     Avoid high sugar and high fat foods to prevent high calorie intakes and a reduced rate of weight loss, or weight regain.  o Check nutrition labels for less than 10 grams of sugar and less than 10 grams of fat per serving.    Continue Taking Vitamins/Minerals:  o 2516-8371 mcg of Sublingual B-12 daily  o 1 Multivitamin with Iron twice daily (chewable or swallow tabs)  o 500-600 mg Calcium Citrate twice daily (chewable or swallow tabs)  o 5000 IU Vitamin D3 daily    Sample Grocery List    Protein:    Fat free Greek or light yogurt (less than 10 grams sugar)    Fat free or low-fat cottage cheese    String cheese or reduced fat cheese slices    Tuna, salmon, crab, egg, or chicken salad made with light or fat free mayonnaise    Egg or Egg Substitute    Lean/extra lean turkey, beef, bison, venison (ground, sirloin, round, flank)    Pork loin or tenderloin (grilled, baked, broiled)    Fish such as salmon, tuna, trout, tilapia, etc. (grilled, baked, broiled)    Tender cuts of lean (skinless) turkey or chicken    Lean deli meats: turkey, lean ham, chicken, lean roast beef    Beans such as kidney, garbanzo,  black, verdugo, or low-fat/fat free refried beans    Peanut butter (natural preferred). Limit to 1 Tbsp. per day.    Low-fat meatloaf (made with lean ground beef or turkey)    Sloppy Joes made with low-sugar ketchup and lean ground beef or turkey    Soy or vegetable protein (i.e. vegan crumbles, soy/veggie burger, tofu)    Hummus    Vegetables:    Fresh: cooked or raw (as tolerated)    Frozen vegetables    Canned vegetables (low sodium or no salt added, rinse before cooking/eating)    (Ok to have skins/peels/membranes/seeds - just chew well)    Fruits:    Fresh fruit    Frozen fruit (no sugar added)    Canned fruit (packed in its own juice, NOT syrup)    (Ok to have skins/peels/membranes/seeds - just chew well)    Starch:    Unsweetened whole-grain hot cereal (or high fiber cold cereal, dry)    Toasted whole wheat bread or [place] Thins    Whole grain crackers    Baked /boiled/mashed potato/sweet potato    Cooked whole grain pasta, brown rice, or other cooked whole grains    Starchy vegetables: corn, peas, winter squash    Protein Supplement:     Ready to drink protein shake with:  o 15-30 grams protein per serving  o Less than 10 grams total carbohydrate per serving     Protein powder mixed with:  o  Skim or 1% milk  o Low fat or fat free Lactaid milk, plain or no sugar added soymilk  o Water     Fats: (use in moderation)    1 teaspoon of soft tub margarine    1 teaspoon olive oil, canola oil, or peanut oil    1 tablespoon of low-fat ng or salad dressing     Sample Menu for 18+ months after Gastric Sleeve    You do NOT need to eat/drink the full portion sizes listed below  Always stop when you are satisfied    Breakfast   cup 1% cottage cheese     cup mixed berries   Lunch 2 oz lean roast beef on   [place] Thin with 1 tsp. light ng    small tomato, chopped, mixed with 1 tsp. light vinaigrette dressing   Supplement Approved protein supplement (if needed between meals)   Dinner 2 oz grilled salmon    cup salad  greens with 1 tsp. light salad dressing and 1 tsp. ground flax seed    cup quinoa or brown rice     Breakfast   cup egg substitute with   cup sautéed chopped vegetables  2 light [place] Krisp crackers   Lunch Tuna Melt:   cup tuna mixed with 1 tsp. light ng over   [place] Thin. Top with 2-3 slices cucumber and 1 oz slice of low fat cheese   Supplement 1 cup skim milk (if needed between meals)   Dinner 3 oz  grilled, broiled, or baked seasoned skinless chicken breast    cup asparagus     Breakfast   cup plain oatmeal made with skim or 1% milk with 1 Tbsp. flavored/unflavored protein powder added  1 mozzarella string cheese   Lunch 2 oz deli turkey breast  1/3 cup salad with 1 tsp. light salad dressing, 1/8 of a whole avocado and 1 Tbsp. sunflower seeds   Dinner 3 oz. pork loin made in a crock pot, seasoned with a spice rub    cup cooked carrots   Supplement Approved protein supplement (if needed between meals)     Breakfast 1 cup breakfast casserole made with egg substitute, turkey sausage,  and steamed, chopped bell peppers   Supplement  1 cup light Greek yogurt (if needed between meals)   Lunch 2 oz. teriyaki turkey    cup mashed sweet potato with 1-2 spritzes of spray butter (like [place])    cup fresh pineapple   Dinner 3 oz low fat meatloaf    cup roasted garlic zucchini     Breakfast   cup leftover breakfast casserole    cup no sugar added applesauce with 1 Tbsp. unflavored protein powder and a sprinkle of cinnamon    Lunch 3 oz shrimp with 1-2 Tbsp. low-sugar cocktail sauce for dipping    c. whole wheat pasta drizzled with   tsp. olive oil   Supplement 1 cup skim/1% milk with scoop of protein powder (if needed between meals)   Dinner Grilled, seasoned kebob with 2 oz lean beef and   cup vegetables     Breakfast Breakfast pizza:   [place] Thin spread with 1 Tbsp. low sugar spaghetti sauce,   cup shredded low fat cheese, melted and 1 slice of [place] cabral     cup fresh fruit mixed with chopped almonds   Lunch    cup black bean soup  4-5 whole grain crackers   Dinner 3 oz  tilapia with lemon pepper seasoning    cup stewed tomatoes   Supplement 1 string cheese (if needed between meals)     Breakfast 2 hard boiled eggs (discard 1 egg yolk)    whole wheat English Muffin with 1 tsp. low sugar jelly   Lunch   cup leftover black bean soup topped with 1-2 Tbsp. low fat cheese  2-3 light [place] Krisp crackers   Supplement Approved protein supplement (if needed between meals)   Dinner 3 oz sirloin steak    cup steamed broccoli           LEAN PROTEIN SOURCES  Getting 20-30 grams of protein, 3 meals daily, is appropriate for most people, some need more but more than about 40 grams per meal is not useful.  General rule is drinking one ounce of water per gram of protein eaten over the course of the day:  70 grams of protein each day, drink 70 oz of water.  Protein Source Portion Calories Grams of Protein                           Nonfat, plain Greek yogurt    (10 grams sugar or less) 3/4 cup (6 oz)  12-17   Light Yogurt (10 grams sugar or less) 3/4 cup (6 oz)  6-8   Protein Shake 1 shake 110-180 15-30   Skim/1% Milk or lactose-free milk 1 cup ( 8 oz)  8   Plain or light, flavored soymilk 1 cup  7-8   Plain or light, hemp milk 1 cup 110 6   Fat Free or 1% Cottage Cheese 1/2 cup 90 15   Part skim ricotta cheese 1/2 cup 100 14   Part skim or reduced fat cheese slices 1 ounce 65-80 8     Mozzarella String Cheese 1 80 8   Canned tuna, chicken, crab or salmon  (canned in water)  1/2 cup 100 15-20   White fish (broiled, grilled, baked) 3 ounces 100 21   [place]/Tuna (broiled, grilled, baked) 3 ounces 150-180 21   Shrimp, Scallops, Lobster, Crab 3 ounces 100 21   Pork loin, Pork Tenderloin 3 ounces 150 21   Boneless, skinless chicken /turkey breast                          (broiled, grilled, baked) 3 ounces 120 21   [place], [place], [place], and Venison 3 ounces 120 21   Lean cuts of red meat and pork (sirloin,   round,  tenderloin, flank, ground 93%-96%) 3 ounces 170 21   Lean or Extra Lean Ground Turkey 1/2 cup 150 20   90-95% Lean [place] Burger 1 van 140-180 21   Low-fat casserole with lean meat 3/4 cup 200 17   Luncheon Meats                                                        (turkey, lean ham, roast beef, chicken) 3 ounces 100 21   Egg (boiled, poached, scrambled) 1 Egg 60 7   Egg Substitute 1/2 cup 70 10   Nuts (limit to 1 serving per day)  3 Tbsp. 150 7   Nut [place]avi (peanut, almond)  Limit to 1 serving or less daily 1 Tbsp. 90 4   Soy Burger (varies) 1  15   Garbanzo, Black, Cortés Beans 1/2 cup 110 7   Refried Beans 1/2 cup 100 7   Kidney and Lima beans 1/2 cup 110 7   Tempeh 3 oz 175 18   Vegan crumbles 1/2 cup 100 14   Tofu 1/2 cup 110 14   Chili (beans and extra lean beef or turkey) 1 cup 200 23   Lentil Stew/Soup 1 cup 150 12   Black Bean Soup 1 cup 175 12             Information about the Weight Loss Medication Phentermine    When combined with mindful eating and behavior changes, weight loss medications can be a nice additional tool to maximize your weight loss season.  There are no magic pills and without diet and behavior changes, weight loss will be minimal.  Think of this medication as a tool to make your diet and behavior changes easier and you'll enjoy a higher probability of success.  Remember not to skip meals, but use this medication to tolerate your reduced calories more easily.  If you are very hungry in the evenings, you are likely not eating enough in the first 10 hours of your day and need to focus on getting your protein requirements in at each of your 3 daily meals.      Phentermine is a stimulant medication related to the amphetamine class of medication but with a lower risk of dependence and addiction.  It is used for weight loss by suppressing the appetite region of the brain.  It also may speed up the metabolic rate and give a person more energy.  Like any medication there are potential  "side effects and the most common are:  Dry mouth occurs in almost everyone (hydrate well), fewer people experience Palpatations, fast heart rate, elevation of blood pressure, restlessness, insomnia, dizziness, change in mood, tremor, headache, changes in bowel movements,itchiness, changes in sex drive.  If you are or may have become pregnant, do not use phentermine as it increases the risk for birth defects/miscarriage.  Do not use if breastfeeding.    Some people can develop serious side effects which include:  Heart strain (\"ischemia\").  Tachycardia (fast heart rate or irregular heart rate).  Hypertension  Pulmonary Hypertension  Psychosis  Dependency and abuse has occurred in some.  If you've been on high dose (37.5mg) for long periods, phentermine should be tapered down over a few weeks before abruptly stopping as seizures have been reported rarely.    We do not recommend taking it in combination with the following medications due to potential drug interactions which can increase the risk of side effects and/or potential for seizures:    Absolutely contraindicated are:  Amphetamines or other stimulants like ADHD medication: (dextroamphetamine, amphetamine, diethylpropion, isocarboxazid, methamphetamine, lisdexamfetamine, benzphetamine,dexmethylphenidate, methylphenidate, selegiline patch, sibutramine, tranylcypromine.    Avoid use with:   Dopamine, dobutamine, ephedra, ephedrine, epinephrine, isoproterenol, linezolid, norepinephrine, phenylephrine injection, venlafaxine (Effexor).    Monitor or modify dose with:  Acebutolol, atenolol, betaxolol, bisoprolol, carvedilol, droxidopa, esmolol, labetalol, magnesium citrate, metoprolol, nadolol, nebivolol, penbutolol, pindolol, propranolol, sotalol, timolol.    Caution with: armodafinil,betaxolol eye drops, brexpiprazole, bupropion, busulfan, caffeine, carteolol drops, enzalutaminde, ginseng, green tea, guarana, levobunolol drops, lindane cream, modafinil, afrin nasal " spray (oxymetazoline), pamabrom, phenylephrine oral and nasal spray, pseudoephedrine (sudafed), rasgiline, sleegiline, bowel prep, tiagabine, timolol drops,  TRAMADOL due to increased risk of seizures.    The current cheapest place to fill your prescription is at [facility], [facility], [facility] or [place] pharmacy,[facility] or [employer] and is around $22for 90 tablets.  Occasionally, [employer] and [employer] have price matched, so call around and get the best price for you.  Other pharmacies may charge closer to$70- $100 for the same prescription. You don't have to be a member to use the pharmacy at [facility] currently.  An alternative some patients have tried is using a voucher system through [employer].  $25 paid on their website gets you a  voucher that can allows you to pick your meds up at [facility] without paying anything more.  [facility] may also offer discounted coupons and give prices around you.    Dosing:  We start with half a tablet for the first 2-3 weeks and if tolerating it without problems, you can take up to one full tablet daily in the morning after breakfast.  For those with evening hunger problems, sometimes half a tablet in the morning and half a tablet around 1 pm can be effective, however, risks of nighttime insomnia/restless increase with afternoon dosing so call me at the clinic if considering this regimen or having any issues.  You only have to use the amount effective for you, not to exceed one full tablet.  It can also be used situationaly and does not have to be taken every day. For more sensitive individuals,: get a pill cutter and cut the half tab into quarters and use a quarter of a tablet, about 9mg, for a couple weeks before increasing to half a tablet (18.75mg) if needed.  As always, if any questions give us a call at the [facility] telephone:  [phone].     Don't use Phentermine if sick/ill or using other stimulants/cold medications and it's OK to skip days that you  "don't feel the need for appetite suppressant assistance.  This medication works the day you take it and doesn't require \"building up\" in the system.    Thank you for your interest in Support Group!  We currently have two options for support group but they are in the virtual format only at this time.  Both groups are using Microsoft Teams for their platform and you can access it through the web or an martinez that can be downloaded to a smart phone if you have one.      The Pre- and Post-op Connections Group is on the second Tuesday of the month from 6:30-8pm and is hosted by Vijay Collins, PhD.  If you are interested in this group, you will need to email him at [email or web address] each month and then he will in turn send you the invitation to join.      We also have a Post-op focused Connections Group the 4th Wednesday of the month from 11am-12pm that is mostly geared toward post-operative patients who are past three months post-op.  This group is hosted by JEANINE Mayes, CBN, CIC and you can email her to join the group at [email or web address] each month and she will send you an invite similar to Vijay's group.  If you decide you would like to be a regular attender at this group, we can add you to an automatic invitation list of people.    You can see more information about available support groups that the [facility] offers to our patients as well by following the link:    [email or web address]      Please let us know if you have any questions.     Thank you,   [facility]    "

## 2021-06-16 PROBLEM — K91.2 POSTOPERATIVE MALABSORPTION: Status: ACTIVE | Noted: 2020-05-06

## 2021-06-16 PROBLEM — Z90.3 HISTORY OF SLEEVE GASTRECTOMY: Status: ACTIVE | Noted: 2020-05-06

## 2021-06-16 NOTE — TELEPHONE ENCOUNTER
Telephone Encounter by Ervin Barry at 3/6/2020  5:21 PM     Author: Ervin Barry Service: -- Author Type: --    Filed: 3/6/2020  5:38 PM Encounter Date: 3/3/2020 Status: Signed    : Ervin Barry APPROVED:    Approval start date: 03/05/2020  Approval end date:  06/03/2020    Pharmacy has been notified of approval and will contact patient when medication is ready for pickup.

## 2021-06-17 NOTE — TELEPHONE ENCOUNTER
Telephone Encounter by Ashly Lee at 6/26/2020  2:42 PM     Author: Ashly Lee Service: -- Author Type: --    Filed: 6/26/2020  2:42 PM Encounter Date: 6/26/2020 Status: Signed    : Ashly Lee APPROVED:    Approval start date: 6/26/2020  Approval end date:  6/26/2021    Pharmacy has been notified of approval and will contact patient when medication is ready for pickup.

## 2021-06-17 NOTE — PATIENT INSTRUCTIONS - HE
Patient Instructions by Alfa Branch MD at 2/4/2019  3:00 PM     Author: Alfa Branch MD Service: -- Author Type: Physician    Filed: 2/4/2019  4:19 PM Encounter Date: 2/4/2019 Status: Signed    : Alfa Branch MD (Physician)       Plan:  1. Welcome to the surgical program, read through your manual a couple times monthly.  2. Follow up dietician and bariatric psych visits to learn good bariatric method.  3. Intake labs can be done anytime.  4. Attend one support group meeting.  5. Anticipate Actigall use for 6 months after surgery to reduce the risk of gallstones.  6. Anticipate tapering down topamax starting about 6 weeks prior to surgery given the higher dose. Your primary can help with this since they are prescribing. Call if issues. Anticipate disconinuation of phentermine 3 weeks prior, you could go every other day for a week prior to discontinuing.  7. Continue working with PT and looking to increase strength and aerobic conditioning.  8. Lifelong abstinence from nicotine to avoid ulcers/pain/GERD and complications.  9. The month prior to surgery stop alcohol. Wean off caffeine the week prior to surgery.        Before being submitted for insurance approval, you will need the following:    -Clearance by the Psychologist  -Clearance by the dietitian  -Attend Support Group ([address] [place] [address] at [facility]) 2nd Tuesday of the month 6:30-8pm. Make sure to sign in.  -Routine Health Care Maintenance must be up to date (mammograms yearly after age 40, paps as recommended by your primary provider,  colonoscopy after age 50, earlier if high risk.  -If you are on estrogen, estrogen will be discontinued one month prior to surgery. It may be resumed one month after surgery unless otherwise advised to limit blood clotting risks.  -Pre Operative Lab work-ordered today.    -Structured weight loss visits IF mandated by your insurance carrier or bariatrician.  -Surgeon consult as we get nearer to potential  surgery or sooner if you have special considerations that may make your surgery more complex.  -If you have sleep apnea you will need to be using CPAP for at least one month before surgery to reduce your risk of breathing problems after surgery. Make sure to bring your CPAP or BiPAP to the hospital at the time of surgery.    -You will need to be tobacco free for 2 months before surgery and remain a non-smoker thereafter. If you are currently smoking or have recently quit, your urine will be evaluated for tobacco metabolites pre-operatively.  Smoking is a major risk factor for developing ulceration of your surgical sites and potential severe complications.    -If you are on insulin, you might be referred to an endocrinologist who will manage your insulin during the liquid diet and around the time of surgery. This endocrinologist does not replace your primary provider or your endocrinologist.   -You will need an exercise plan which includes MOVE, ie., walking and MUSCLE, ie.,calisthenics, bands, weight, machines, etc...Maintenance of long term weight loss and quality of life is much improved with regular exercise as the weight comes off.  ______________________________________________________________________    Remember that after your bariatric surgery, vitamin supplementation is a lifelong need.    You will take:    B-12 300-500mcg or higher sublingual (under the tongue) daily or by 1000mcg injection 1-2X/month  D3:  3000- 5000 IUs daily   Multivitamin containing 18mg of iron twice a day  Calcium citrate 1 or 2 daily    To keep your weight off and your vitamin levels up, follow-up is important.    Your labs will be monitored every 6 months for the first two years (every 3 months if you had a duodenal switch) and yearly thereafter.    To avoid ulcers in your stomach avoid tobacco forever, alcohol in excess, caffeine in excess and anti-inflammatories (NSAIDS)  (Aspirin, Ibuprofen, Naproxen and similar medications).  Tylenol is fine at usual doses on the box.    If you are told by your physician take Aspirin to protect your heart or for another reason, make sure to take omeprazole or similar medication (protonix, nexium, prevacid) to protect your stomach.    Remember that alcohol affects you differently after bariatric surgery. If you have even ONE drink DO NOT DRIVE due to rapid absorption and fast spiking of blood alcohol levels within minutes of consumption.     Slowly Increasing your exercise capacity such that 3-6 months after your surgery you're tolerating 150-250 minutes of exercise weekly will help optimize your metabolism and improve the chance of good weight loss maintenance.

## 2021-06-18 NOTE — LETTER
Letter by Alfa Branch MD at      Author: Alfa Branch MD Service: -- Author Type: --    Filed:  Encounter Date: 1/16/2019 Status: (Other)       1/16/2019      Donna Bolanos  [address]  [place] [address]      Dear Marshal Thompson and thank you for your interest in the bariatric program at [facility]!     Your appointment is scheduled at our [facility] Office on Monday February 4, 2019 at 3:00 PM with Dr. Alfa Branch.  We ask that you arrive 45 minutes prior to your appointment time to complete your registration.   We strive to avoid clinic delays for our patients, so patients arriving late will need to reschedule.    Your first appointment will take about two hours.     In preparation for this appointment you will need to bring the following:      Your insurance card and photo identification    Completed health history form (You can find this in your MyChart). There will not be time to complete this in the office so we will need to reschedule if you forget to do this.     All your medications in their original containers, including over-the-counter medications.    Any lab results that you have had done within the last 6 months.     If you are interested in our surgical program; call your insurance company prior to this visit, to verify that your specific insurance plan covers Weight-Loss Surgery and what your out-of-pocket costs may be.     Your appointment has been scheduled at our [place] Office- [address], [address], [place] [address].  [phone].    If you find yourself unable to keep this appointment, please call us to reschedule at your earliest convenience so we can accommodate other patients.    We are excited that you have chosen our program and look forward to serving you!    Sincerely,  The [facility]ariatric Team

## 2021-06-18 NOTE — LETTER
Letter by Alfa Branch MD at      Author: Alfa Branch MD Service: -- Author Type: --    Filed:  Encounter Date: 2/4/2019 Status: (Other)       Gela Hummel NP  [address] [place] [address]                                  February 4, 2019    Patient: Donna Bolanos   MR Number: [id number]   YOB: 1985   Date of Visit: 2/4/2019     Dear Dr. Estephanie NP:    Thank you for referring Donna Bolanos to me for evaluation. Below are the relevant portions of my assessment and plan of care.    If you have questions, please do not hesitate to call me. I look forward to following Donna along with you.    Sincerely,        Alfa Branch MD          CC  No Recipients  Alfa Branch MD  2/4/2019  4:26 PM  Sign at close encounter  Outpatient Bariatric Medicine Consultation on 2/4/2019, 3:14 PM.    Indication: Medical Bariatric Consultation to [facility]  Primary Provider: Gela Hummel NP  Requesting Physician: Dr. Louis  Type of Bariatric Surgery: Sleeve Gastrectomy    Impression Donna Bolanos is a 33 y.o. year old female.  Poor functional capacity and musculoskeletal disability due to morbid obesity which satisfies NIH criteria for bariatric surgery. Her  Body mass index is 40.83 kg/m .,  Vitals:    02/04/19 1451   Weight: (!) 270 lb 8 oz (122.7 kg)   .     She's enjoyed good health thus far in life apart from some chronic upper neck and back pain issues that she handles with chiro/massage therapy and PT,   Special considerations include:  Occasional social nicotine use. Committed to discontinuation for the rest of her life.  Has some mindless eating/distracted eating at work/home that she can work on.        She was referred to our clinic by Dr. Cardenas.  Donna Bolanos hopes to achieve improved physical confidence/self efficacy and function after bariatric surgery.    PLAN of ACTION:  1.  1. Welcome to the surgical program, read through your manual a couple  times monthly.  2. Follow up dietician and bariatric psych visits to learn good bariatric method.  3. Intake labs can be done anytime.  4. Attend one support group meeting.  5. Anticipate Actigall use for 6 months after surgery to reduce the risk of gallstones.  6. Anticipate tapering down topamax starting about 6 weeks prior to surgery given the higher dose. Your primary can help with this since they are prescribing. Call if issues. Anticipate disconinuation of phentermine 3 weeks prior, you could go every other day for a week prior to discontinuing.  7. Continue working with PT and looking to increase strength and aerobic conditioning.  8. Lifelong abstinence from nicotine to avoid ulcers/pain/GERD and complications.  9. The month prior to surgery stop alcohol. Wean off caffeine the week prior to surgery.  10. Update photos when available.    BARIATRIC RECOMMENDATIONS  Bariatric therapy is indicated for Donna as a means of modifying her obesity related co-morbidities.  Therapeutic lifestyle changes have not lead to significant and or durable weight loss.  Surgical bariatric therapy is most likely to induce significant weight loss, promote long-term weight maintenance and lead to clinical improvement and/or resolution of her weight related co-morbidities.   -Medical Nutrition Therapy is indicated including comprehensive guidance of nutrition and lifestyle management.  -A Bariatric Psychological Assessment and clearance is indicated.  -Screening Bariatric Laboratory studies are indicated.  -Currency of Routine Healthcare Maintenance is indicated.  -Physical Activity Guidance was provided. Follow up of recommendations will be provided.    PERIOPERATIVE RECOMMENDATIONS  CARDIAC: Cardiac consultation and clearance will be required of patients with significant cardiac disease and/or multiple cardiac risk factors.  PULMONARY: Pre-operative therapy with CPAP/BIPAP is indicated for a minimum of one month for patients with  sleep apnea. Complete tobacco abstinence for two months pre-operatively and indefinitely thereafter is required. Nicotine is a major cause/risk for potentially severe ulcers/complications following bariatric surgery.  RENAL: Diuretics may need to be discontinued 2 weeks before surgery at the time of liquid diet if the patient is on them at that time.  ENDOCRINE: Optimizing perioperative glycemic control is indicated. Our goal is an AIC of 8 or less at the time of surgery for optimal healing. Patients using insulin may be referred to an endocrinologist for perioperative insulin management if they don't have appropriate control.  GASTROINTESTINAL: Evaluation of the esophagus and stomach by EGD and/or UGI series will be considered in patients with severe GERD, previous weight loss surgery, or other indication.  GYNECOLOGIC: For patients on Estrogen- Estrogen will be discontinued 4 weeks prior to surgery and resumed 4 weeks after surgery unless otherwise advised. Reliable contraception is required post-operatively for 1 year for women of childbearing age. DEPOT PROVERA is contraindicated due to its association with weight gain. Post-operative birth control plan is IUD  MUSCULOSKELETAL/RHEUMATOLOGIC: NSAIDS are contraindicated following surgery and lifelong abstinence is indicated. When indicated for cardioprotection or otherwise, patients should use an enteric coated ASA and concomitant proton pump inhibitor.  HEMATOLOGIC: Risks of deep venous thromboembolism have been assessed. Patients with history of DVT/PE or current anti-coagulation will be placed on the High Risk DVT Prophylaxis Protocol. Objections (if any) to receiving blood products if necessary have been documented.  DENTAL: Reasonable and functional dentition is indicated in order to chew food to applesauce consistency post-operatively.  NUTRITIONAL: Pre and post-operative nutritional and lifestyle modification guidance is indicated. Pre-operative weight  "reduction can reduce liver volume, improving technical aspects of surgery.    History Surrounding Consultation  Struggles with weight started at age most of her life, increased in late 20s after starting after going back to school.  Her weight at age 18 was see below  She has had several past supervised and unsupervised weight loss attempts  The most weight lost was: see below  Unfortunately there was not durable weight maintenance.  History of bulimia, anorexia, or binge eating disorder? no  If Present has eating disorder been in remission at least 3 years? na  Night time eating? no    Dietary History  Meals per day: 2, tends to skip breakfast.  Snacks: no  Typical Snack: salty  Who does the grocery shopping? With fiance, shared   Who does the cooking? Mostly her  A typical meal includes: supper is protein/chicken w/ sauce/cauliflower cous cous and veggie.  Regular Pop: no  Juice: no  Caffeine: black coffee 1.5 daily.  Amount of restaurant eating per week: formerly more, every other week.  Eating a the table with the TV off? Couch at home. Eats at desk. Hunting for new job.    Physical Activity Patterns  Current physical activity routine includes: currently no due to long hours. Yoga at home. And PT stretches (self care neck/back and headaches).     Limitations from being physically active on a regular basis includes: no    She describes her general health as: good    Past Medical History  No past medical history on file.  There is no problem list on file for this patient.    HTN: no  Dyslipidemia: no  PHILLIP: no  Obesity Hypoventilation: NO  DM2: no DM1: no DX: no Most recent AIC: no  Neuropathy: no  Nephropathy: no  Retinopathy: no  IFG or \"pre-DM\": no  MI: no  CVA:no  CHF: no  Previous cardiac testing includes: no  Cancers: no  Kidney Disease: no  DVT: no  PE: no  Colitis: no  Crohn's: no  IBS: no  PUD: no  Fatty Liver: no  Abnormal LFTs: no  Hepatitis: no  Asthma: no  Bronchitis: no  Pneumonia: no  Other Lung " Problems: no  Back Pain:neck/back  DDD: no  Gout: no  Fibromyalgia: no  Severe Headaches: occ  Seizures: no If so, last seizure: no  Pseudotumor: no  PCOS: no  Menstrual Irregularity: on IUD amenorrheic  Menorrhagia: no  Infertility: no  Thyroid problems: no  Thyroid medications: no  Glaucoma: no  HIV positive: NO  MRSA/VRE history: no  History of Blood transfusion: no  Anemia: no    Medications   Current Outpatient Medications   Medication Sig Dispense Refill   ? levonorgestrel (MIRENA) 20 mcg/24 hr (5 years) IUD 1 each by Intrauterine route.     ? phentermine 15 MG capsule Take 15 mg by mouth daily.     ? topiramate (TOPAMAX) 100 MG tablet Take 100 mg by mouth daily.     ? cholecalciferol, vitamin D3, (CHOLECALCIFEROL) 1,000 unit tablet Take 1 tablet by mouth daily.     ? fluticasone (FLONASE) 50 mcg/actuation nasal spray 2 sprays into each nostril daily as needed.            ? mv-min-FA-Kh-Wt-esbwhsw-lutein (MULTIVITAL) 0.4-162-18 mg Tab Take 1 tablet by mouth daily.       No current facility-administered medications for this visit.      Allergies   Patient has no known allergies.  Past Surgical History  No past surgical history on file.  History of problems with anesthesia: no previous surgery.  History of Malignant Hyperthermia: NO    Gynecological History     Menarche: 13  Regular: yes  Currently: IUD  Problems getting pregnant: no  MD Involvement: no If so, explanation/Diagnosis: no  : 1  Para: 0  C-S: no  Vaginal deliveries: no  SAB:one  EAB: o  Gestational DM: 0  Gestational HTN: 0  Preeclampsia: 0  Current Birth Control Method: IUD    Family History  family history includes Depression in her sister; Liver disease in her brother; No Medical Problems in her father, sister, and sister; Scoliosis in her mother.    Social History  Social History     Socioeconomic History   ? Marital status: Domestic Partner     Spouse name: None   ? Number of children: None   ? Years of education: None   ? Highest  education level: None   Social Needs   ? Financial resource strain: None   ? Food insecurity - worry: None   ? Food insecurity - inability: None   ? Transportation needs - medical: None   ? Transportation needs - non-medical: None   Occupational History   ? None   Tobacco Use   ? Smoking status: Former Smoker     Last attempt to quit: 2010     Years since quittin.0   ? Smokeless tobacco: Never Used   Substance and Sexual Activity   ? Alcohol use: Yes     Alcohol/week: 0.0 - 1.8 oz   ? Drug use: No   ? Sexual activity: Yes     Partners: Male     Birth control/protection: IUD   Other Topics Concern   ? None   Social History Narrative   ? 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      Addiction History  Current or Past history of nicotine, alcohol or substance abuse: no  Last used: no  Chemical Dependency Treatment History: no  Chemicals: previous nicotine use.  Informed about need to be tobacco free 2 months before and indefinitely after surgery due to risk of Marginal Ulcers.    Psychiatric History  Diagnoses: no  Treated by: no  Psychiatric Hospitalizations: no  Suicide attempts: no  Panic attacks: no  History of Abuse: no    Palliative Medicine History  Involvement in a pain clinic: no    Dental History  Missing teeth: 2 molars  Pending Dental Work: pending implants  Regular Dental Visits: yes  Dentures/Partials: no              ROS:    Snoring: no  Witnessed Apneas no  PND no  Termo Score: 2  STOP BAN  General  Fatigue: no  Sleep Quality:depends if fiance snores  HEENT  Visual changes: no  Cardiovascular  Murmur: no  Elevated BP: no  Chest Pain with Exertion: no  Dyspnea with Exertion: no  Palpitations: no  Lower Extremity Edema: no  Syncope: no  Blood Clotting Problems:  no  Pulmonary  Shortness of breath at rest: no  Wheezing: no  CPAP use: no  Gastrointestinal  Trouble swallowing:no  Heartburn: no  HX UGI/EGD: no  Abdominal pain: no  Hematochezia: no  Urologic  Hesitancy:  "no  Urgency: no  Bloody Urine:no  Genitourinary  ED: na  Menorrhagia: no  Dysmenorrhea: no  Neurologic  Severe headache:tension style, works w/ chiro/massage.   Paresthesias: no  Psychiatric  Moods Stable: no  Hallucinations: no  Rheumatologic  Myalgias: neck/back.   Arthralgias: some knee cracking w/ stair walking  Endocrine  Polydipsia: no  Polyuria: no  Galactorrhea: no  Heat intolerance: colder  Hirsutism: no  Musculoskeletal  Joint pain:see above  Falls: no  Use of cane, crutch or motorized scooter: no  Hematologic  Abnormal Bleeding or Clotting: no  Dermatologic  Skin Tags: few axillary  Striae: yes, abdominal/arms and hips  Furuncles/boils: no  Acne: no  Intertrigo: no  Lower Leg ulcers: no      Physical Exam  Vitals: /74   Pulse 97   Temp 98.1  F (36.7  C)   Resp 18   Ht 5' 8.25\" (1.734 m)   Wt (!) 270 lb 8 oz (122.7 kg)   SpO2 100%   BMI 40.83 kg/m     Height:   Ht Readings from Last 1 Encounters:   02/04/19 5' 8.25\" (1.734 m)     Weight:   Wt Readings from Last 1 Encounters:   02/04/19 (!) 270 lb 8 oz (122.7 kg)     Height: 5' 8.25\" (1.734 m) (2/4/2019  2:51 PM)  Initial Weight: 270.5 lbs (2/4/2019  3:00 PM)  Weight: (!) 270 lb 8 oz (122.7 kg) (2/4/2019  2:51 PM)  BMI (Calculated): 40.8 (2/4/2019  2:51 PM)  SpO2: 100 % (2/4/2019  2:51 PM)  Waist Circumference (In): 45 Inches (2/4/2019  3:00 PM)  Hip Circumference (In): 50 Inches (2/4/2019  3:00 PM)  Neck Circumference (In): 14.5 Inches (2/4/2019  3:00 PM)  Body fat percentage: 50.6 (2/4/2019  3:00 PM)    Body mass index is 40.83 kg/m .    General Appearance  No acute distress. Obesity: class III  Alert: yes  Sleepy: no  HEENT  PERRLA, EOMI  Neck  Stout: no nodules No carotid bruits  Airway: mallampati 4  Cardiovascular  Rhythm regular Rate Regular  Murmur: none  Pulmonary  [place] Score: 2  Lungs clear to ascultation  Abdomen  No rashes.   Post surgical Scars: none.   Extremities:  Pitting edema: none  Palpable distal pulses: 2 " plus    Neurologic  Tremors: none.  Alert and oriented with intact cognition.   Speech fluent and cranial nerves are grossly intact.  Psychiatric  Thought Content Organized  Mood appears stable  Endocrine  Moon Facies: NO  Dorsal Thoracic Prominence: NO  Skin tags: none  Acanthosis nigricans: no  Dermatologic  Intertrigo: no rash/pallor.    Intake Photos:  No images are attached to the encounter.    LABS on File:    No results found for: 62474  No results found for: 7597    No results found for: WBC, HGB, HCT, MCV, PLT      No results found for: AST, ALT, GGT, ALKPHOS, BILITOT  No results found for: LIPASE    No results found for: INR    No results found for: HGBA1C    No results found for: TSH  No results found for: PTH  No results found for: [id number]  No results found for: FERRITIN    No results found for: TRIG  No results found for: CHOL  No results found for: HDL    No results found for: FOLATE  No components found for: THIAMINE  No results found for: TESTOSTERONE  No results found for: [id number]        Counseled on what to expect regarding the post operative dietary recommendations which include:  -eating 3 meals daily  -eating protein first, getting >60gm protein daily 80gm if duodenal switch  -eating slowly, chewing food well  -avoiding/limiting calorie containing beverages  -drinking water 15-30 minutes before or after meals  -choosing wheat, not white with breads, crackers, pastas, edison, bagels, tortillas, rice  -limiting restaurant or cafeteria eating to twice a week or less    We discussed the importance of restorative sleep and stress management in maintaining a healthy weight.  We discussed the National Weight Control Registry healthy weight maintenance strategies and ways to optimize metabolism.  We discussed the importance of physical activity including cardiovascular and strength training in maintaining a healthier weight.  We discussed the importance of lifelong vitamin supplementation and  lifelong follow-up.    Donna Bolanos was reminded that, postoperatively,  to avoid marginal ulcers she should avoid tobacco at all, alcohol in excess, caffeine in excess, and NSAIDS (unless indicated for cardioprotection or othewise and opposed by a PPI).     She was also reminded on the lifelong need for vitamin supplementation after bariatric surgery due to post operative malabsorption to maintain adequate nutrition and health.  Vitamin levels will be followed at least annually, more frequent until stability assured.     She was reminded that after bariatric surgery alcohol will affect her differently and she should not drive after consuming even one alcoholic beverage.  Time spent 60 minutes face-to-face with over 50% of the time spent counseling about how excess weight may affect her health, improving dietary hygiene and habits, details of the bariatric surgery pathway and our expectations and requirements prior to any surgery, and coordination of treatment plan.      Answers for HPI/ROS submitted by the patient on 2/4/2019   BARIATRIC NEW PATIENT  Interested in Surgery?: Yes  [place] About?: Other  [place] Other:: [facility] recommendation  Barriers to learning:: No  Attended Seminar?: Yes  Date Attended:: 1/16/2019  Email for Newsletter:: [email or web address]  PCP:: Dr. Gilda Cardenas, Dr. Gela Hummel, [facility], [address] , [place] [address] , [phone]  Specialist:: Physical Therapist, Dr. Yazmin Yancey PT, DPT, [facility], [address] [place] [address], [address], [place] [address]  Mental Health Provider:: n/a  Present Ht:: 68.5  Present Wt:: 270  Age Overweight:: 12  Age 100lbs:: 11  Wt 18yrs:: 220  Wt 5yrs ago:: 265  # of Wt Loss Efforts:: 5+  Prescribed Wt Loss Meds?: Yes  Prescribed Meds:: phentermine  OTC Wt Loss Meds?: No  Surgeon Choice:: undecided  Procedure Choice:: Gastric Sleeve  Program1:: Dr. Gilda Cardenas -  Phentermine along with eating healthy diet  Start  Date1:: 3/15/2018  Total Months1:: 11  Starting Wt1:: 288.5  Ending Wt1:: 270  Pounds Lost1:: 18.5  Program2:: low carb and exercise  Start Date2:: 6/20/2017  End Date2:: 11/20/2017  Total Months2:: 5  Starting Wt2:: 291  Ending Wt2:: 271.6  Pounds Lost2:: 19.4  Program3:: low calorie  Start Date3:: 12/18/2012  End Date3:: 3/25/2013  Total Months3:: 3  Starting Wt3:: 248  Ending Wt3:: 236  Pounds Lost3:: 12  Program4:: low calorie and exercise  Start Date4:: 10/1/2008  End Date4:: 8/1/2010  Total Months4:: 9  Starting Wt4:: 2  Ending Wt4:: 220  Pounds Lost4:: 215  Program5:: low calorie exercise  Start Date5:: 7/1/2007  End Date5:: 7/1/2008  Total Months5:: 12  Starting Wt5:: 260  Ending Wt5:: 210  Pounds Lost5:: 50

## 2021-06-23 NOTE — PROGRESS NOTES
Outpatient Bariatric Medicine Consultation on 2/4/2019, 3:14 PM.    Indication: Medical Bariatric Consultation to [facility]  Primary Provider: Gela Hummel NP  Requesting Physician: Dr. Louis  Type of Bariatric Surgery: Sleeve Gastrectomy    Impression Donna Bolanos is a 33 y.o. year old female.  Poor functional capacity and musculoskeletal disability due to morbid obesity which satisfies NIH criteria for bariatric surgery. Her  Body mass index is 40.83 kg/m .,  Vitals:    02/04/19 1451   Weight: (!) 270 lb 8 oz (122.7 kg)   .     She's enjoyed good health thus far in life apart from some chronic upper neck and back pain issues that she handles with chiro/massage therapy and PT,   Special considerations include:  Occasional social nicotine use. Committed to discontinuation for the rest of her life.  Has some mindless eating/distracted eating at work/home that she can work on.        She was referred to our clinic by Dr. Cardenas.  Donna Bolanos hopes to achieve improved physical confidence/self efficacy and function after bariatric surgery.    PLAN of ACTION:  1.  1. Welcome to the surgical program, read through your manual a couple times monthly.  2. Follow up dietician and bariatric psych visits to learn good bariatric method.  3. Intake labs can be done anytime.  4. Attend one support group meeting.  5. Anticipate Actigall use for 6 months after surgery to reduce the risk of gallstones.  6. Anticipate tapering down topamax starting about 6 weeks prior to surgery given the higher dose. Your primary can help with this since they are prescribing. Call if issues. Anticipate disconinuation of phentermine 3 weeks prior, you could go every other day for a week prior to discontinuing.  7. Continue working with PT and looking to increase strength and aerobic conditioning.  8. Lifelong abstinence from nicotine to avoid ulcers/pain/GERD and complications.  9. The month prior to surgery stop  alcohol. Wean off caffeine the week prior to surgery.  10. Update photos when available.    BARIATRIC RECOMMENDATIONS  Bariatric therapy is indicated for Donna as a means of modifying her obesity related co-morbidities.  Therapeutic lifestyle changes have not lead to significant and or durable weight loss.  Surgical bariatric therapy is most likely to induce significant weight loss, promote long-term weight maintenance and lead to clinical improvement and/or resolution of her weight related co-morbidities.   -Medical Nutrition Therapy is indicated including comprehensive guidance of nutrition and lifestyle management.  -A Bariatric Psychological Assessment and clearance is indicated.  -Screening Bariatric Laboratory studies are indicated.  -Currency of Routine Healthcare Maintenance is indicated.  -Physical Activity Guidance was provided. Follow up of recommendations will be provided.    PERIOPERATIVE RECOMMENDATIONS  CARDIAC: Cardiac consultation and clearance will be required of patients with significant cardiac disease and/or multiple cardiac risk factors.  PULMONARY: Pre-operative therapy with CPAP/BIPAP is indicated for a minimum of one month for patients with sleep apnea. Complete tobacco abstinence for two months pre-operatively and indefinitely thereafter is required. Nicotine is a major cause/risk for potentially severe ulcers/complications following bariatric surgery.  RENAL: Diuretics may need to be discontinued 2 weeks before surgery at the time of liquid diet if the patient is on them at that time.  ENDOCRINE: Optimizing perioperative glycemic control is indicated. Our goal is an AIC of 8 or less at the time of surgery for optimal healing. Patients using insulin may be referred to an endocrinologist for perioperative insulin management if they don't have appropriate control.  GASTROINTESTINAL: Evaluation of the esophagus and stomach by EGD and/or UGI series will be considered in patients with severe  GERD, previous weight loss surgery, or other indication.  GYNECOLOGIC: For patients on Estrogen- Estrogen will be discontinued 4 weeks prior to surgery and resumed 4 weeks after surgery unless otherwise advised. Reliable contraception is required post-operatively for 1 year for women of childbearing age. DEPOT PROVERA is contraindicated due to its association with weight gain. Post-operative birth control plan is IUD  MUSCULOSKELETAL/RHEUMATOLOGIC: NSAIDS are contraindicated following surgery and lifelong abstinence is indicated. When indicated for cardioprotection or otherwise, patients should use an enteric coated ASA and concomitant proton pump inhibitor.  HEMATOLOGIC: Risks of deep venous thromboembolism have been assessed. Patients with history of DVT/PE or current anti-coagulation will be placed on the High Risk DVT Prophylaxis Protocol. Objections (if any) to receiving blood products if necessary have been documented.  DENTAL: Reasonable and functional dentition is indicated in order to chew food to applesauce consistency post-operatively.  NUTRITIONAL: Pre and post-operative nutritional and lifestyle modification guidance is indicated. Pre-operative weight reduction can reduce liver volume, improving technical aspects of surgery.    History Surrounding Consultation  Struggles with weight started at age most of her life, increased in late 20s after starting after going back to school.  Her weight at age 18 was see below  She has had several past supervised and unsupervised weight loss attempts  The most weight lost was: see below  Unfortunately there was not durable weight maintenance.  History of bulimia, anorexia, or binge eating disorder? no  If Present has eating disorder been in remission at least 3 years? na  Night time eating? no    Dietary History  Meals per day: 2, tends to skip breakfast.  Snacks: no  Typical Snack: salty  Who does the grocery shopping? With fiance, shared   Who does the cooking?  "Mostly her  A typical meal includes: supper is protein/chicken w/ sauce/cauliflower cous cous and veggie.  Regular Pop: no  Juice: no  Caffeine: black coffee 1.5 daily.  Amount of restaurant eating per week: formerly more, every other week.  Eating a the table with the TV off? Couch at home. Eats at desk. Hunting for new job.    Physical Activity Patterns  Current physical activity routine includes: currently no due to long hours. Yoga at home. And PT stretches (self care neck/back and headaches).     Limitations from being physically active on a regular basis includes: no    She describes her general health as: good    Past Medical History  No past medical history on file.  There is no problem list on file for this patient.    HTN: no  Dyslipidemia: no  PHILLIP: no  Obesity Hypoventilation: NO  DM2: no DM1: no DX: no Most recent AIC: no  Neuropathy: no  Nephropathy: no  Retinopathy: no  IFG or \"pre-DM\": no  MI: no  CVA:no  CHF: no  Previous cardiac testing includes: no  Cancers: no  Kidney Disease: no  DVT: no  PE: no  Colitis: no  Crohn's: no  IBS: no  PUD: no  Fatty Liver: no  Abnormal LFTs: no  Hepatitis: no  Asthma: no  Bronchitis: no  Pneumonia: no  Other Lung Problems: no  Back Pain:neck/back  DDD: no  Gout: no  Fibromyalgia: no  Severe Headaches: occ  Seizures: no If so, last seizure: no  Pseudotumor: no  PCOS: no  Menstrual Irregularity: on IUD amenorrheic  Menorrhagia: no  Infertility: no  Thyroid problems: no  Thyroid medications: no  Glaucoma: no  HIV positive: NO  MRSA/VRE history: no  History of Blood transfusion: no  Anemia: no    Medications   Current Outpatient Medications   Medication Sig Dispense Refill     levonorgestrel (MIRENA) 20 mcg/24 hr (5 years) IUD 1 each by Intrauterine route.       phentermine 15 MG capsule Take 15 mg by mouth daily.       topiramate (TOPAMAX) 100 MG tablet Take 100 mg by mouth daily.       cholecalciferol, vitamin D3, (CHOLECALCIFEROL) 1,000 unit tablet Take 1 tablet by " mouth daily.       fluticasone (FLONASE) 50 mcg/actuation nasal spray 2 sprays into each nostril daily as needed.              mv-min-FA-Kh-Ix-nuxtyvt-lutein (MULTIVITAL) 0.4-162-18 mg Tab Take 1 tablet by mouth daily.       No current facility-administered medications for this visit.      Allergies   Patient has no known allergies.  Past Surgical History  No past surgical history on file.  History of problems with anesthesia: no previous surgery.  History of Malignant Hyperthermia: NO    Gynecological History     Menarche: 13  Regular: yes  Currently: IUD  Problems getting pregnant: no  MD Involvement: no If so, explanation/Diagnosis: no  : 1  Para: 0  C-S: no  Vaginal deliveries: no  SAB:one  EAB: o  Gestational DM: 0  Gestational HTN: 0  Preeclampsia: 0  Current Birth Control Method: IUD    Family History  family history includes Depression in her sister; Liver disease in her brother; No Medical Problems in her father, sister, and sister; Scoliosis in her mother.    Social History  Social History     Socioeconomic History     Marital status: Domestic Partner     Spouse name: None     Number of children: None     Years of education: None     Highest education level: None   Social Needs     Financial resource strain: None     Food insecurity - worry: None     Food insecurity - inability: None     Transportation needs - medical: None     Transportation needs - non-medical: None   Occupational History     None   Tobacco Use     Smoking status: Former Smoker     Last attempt to quit: 2010     Years since quittin.0     Smokeless tobacco: Never Used   Substance and Sexual Activity     Alcohol use: Yes     Alcohol/week: 0.0 - 1.8 oz     Drug use: No     Sexual activity: Yes     Partners: Male     Birth control/protection: IUD   Other Topics Concern     None   Social History Narrative     None     Work Status: desk job, 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      Addiction History  Current or Past history of  nicotine, alcohol or substance abuse: no  Last used: no  Chemical Dependency Treatment History: no  Chemicals: previous nicotine use.  Informed about need to be tobacco free 2 months before and indefinitely after surgery due to risk of Marginal Ulcers.    Psychiatric History  Diagnoses: no  Treated by: no  Psychiatric Hospitalizations: no  Suicide attempts: no  Panic attacks: no  History of Abuse: no    Palliative Medicine History  Involvement in a pain clinic: no    Dental History  Missing teeth: 2 molars  Pending Dental Work: pending implants  Regular Dental Visits: yes  Dentures/Partials: no              ROS:    Snoring: no  Witnessed Apneas no  PND no  [place] Score: 2  STOP BAN  General  Fatigue: no  Sleep Quality:depends if fiance snores  HEENT  Visual changes: no  Cardiovascular  Murmur: no  Elevated BP: no  Chest Pain with Exertion: no  Dyspnea with Exertion: no  Palpitations: no  Lower Extremity Edema: no  Syncope: no  Blood Clotting Problems:  no  Pulmonary  Shortness of breath at rest: no  Wheezing: no  CPAP use: no  Gastrointestinal  Trouble swallowing:no  Heartburn: no  HX UGI/EGD: no  Abdominal pain: no  Hematochezia: no  Urologic  Hesitancy: no  Urgency: no  Bloody Urine:no  Genitourinary  ED: na  Menorrhagia: no  Dysmenorrhea: no  Neurologic  Severe headache:tension style, works w/ chiro/massage.   Paresthesias: no  Psychiatric  Moods Stable: no  Hallucinations: no  Rheumatologic  Myalgias: neck/back.   Arthralgias: some knee cracking w/ stair walking  Endocrine  Polydipsia: no  Polyuria: no  Galactorrhea: no  Heat intolerance: colder  Hirsutism: no  Musculoskeletal  Joint pain:see above  Falls: no  Use of cane, crutch or motorized scooter: no  Hematologic  Abnormal Bleeding or Clotting: no  Dermatologic  Skin Tags: few axillary  Striae: yes, abdominal/arms and hips  Furuncles/boils: no  Acne: no  Intertrigo: no  Lower Leg ulcers: no      Physical Exam  Vitals: /74   Pulse 97   Temp 98.1  " F (36.7  C)   Resp 18   Ht 5' 8.25\" (1.734 m)   Wt (!) 270 lb 8 oz (122.7 kg)   SpO2 100%   BMI 40.83 kg/m    Height:   Ht Readings from Last 1 Encounters:   02/04/19 5' 8.25\" (1.734 m)     Weight:   Wt Readings from Last 1 Encounters:   02/04/19 (!) 270 lb 8 oz (122.7 kg)     Height: 5' 8.25\" (1.734 m) (2/4/2019  2:51 PM)  Initial Weight: 270.5 lbs (2/4/2019  3:00 PM)  Weight: (!) 270 lb 8 oz (122.7 kg) (2/4/2019  2:51 PM)  BMI (Calculated): 40.8 (2/4/2019  2:51 PM)  SpO2: 100 % (2/4/2019  2:51 PM)  Waist Circumference (In): 45 Inches (2/4/2019  3:00 PM)  Hip Circumference (In): 50 Inches (2/4/2019  3:00 PM)  Neck Circumference (In): 14.5 Inches (2/4/2019  3:00 PM)  Body fat percentage: 50.6 (2/4/2019  3:00 PM)    Body mass index is 40.83 kg/m .    General Appearance  No acute distress. Obesity: class III  Alert: yes  Sleepy: no  HEENT  PERRLA, EOMI  Neck  Stout: no nodules No carotid bruits  Airway: mallampati 4  Cardiovascular  Rhythm regular Rate Regular  Murmur: none  Pulmonary  [place] Score: 2  Lungs clear to ascultation  Abdomen  No rashes.   Post surgical Scars: none.   Extremities:  Pitting edema: none  Palpable distal pulses: 2 plus    Neurologic  Tremors: none.  Alert and oriented with intact cognition.   Speech fluent and cranial nerves are grossly intact.  Psychiatric  Thought Content Organized  Mood appears stable  Endocrine  Moon Facies: NO  Dorsal Thoracic Prominence: NO  Skin tags: none  Acanthosis nigricans: no  Dermatologic  Intertrigo: no rash/pallor.    Intake Photos:  No images are attached to the encounter.    LABS on File:    No results found for: 44853  No results found for: 7597    No results found for: WBC, HGB, HCT, MCV, PLT      No results found for: AST, ALT, GGT, ALKPHOS, BILITOT  No results found for: LIPASE    No results found for: INR    No results found for: HGBA1C    No results found for: TSH  No results found for: PTH  No results found for: [id number]  No results found " for: FERRITIN    No results found for: TRIG  No results found for: CHOL  No results found for: HDL    No results found for: FOLATE  No components found for: THIAMINE  No results found for: TESTOSTERONE  No results found for: [id number]        Counseled on what to expect regarding the post operative dietary recommendations which include:  -eating 3 meals daily  -eating protein first, getting >60gm protein daily 80gm if duodenal switch  -eating slowly, chewing food well  -avoiding/limiting calorie containing beverages  -drinking water 15-30 minutes before or after meals  -choosing wheat, not white with breads, crackers, pastas, edison, bagels, tortillas, rice  -limiting restaurant or cafeteria eating to twice a week or less    We discussed the importance of restorative sleep and stress management in maintaining a healthy weight.  We discussed the National Weight Control Registry healthy weight maintenance strategies and ways to optimize metabolism.  We discussed the importance of physical activity including cardiovascular and strength training in maintaining a healthier weight.  We discussed the importance of lifelong vitamin supplementation and lifelong follow-up.    Donna M Cici was reminded that, postoperatively,  to avoid marginal ulcers she should avoid tobacco at all, alcohol in excess, caffeine in excess, and NSAIDS (unless indicated for cardioprotection or othewise and opposed by a PPI).     She was also reminded on the lifelong need for vitamin supplementation after bariatric surgery due to post operative malabsorption to maintain adequate nutrition and health.  Vitamin levels will be followed at least annually, more frequent until stability assured.     She was reminded that after bariatric surgery alcohol will affect her differently and she should not drive after consuming even one alcoholic beverage.  Time spent 60 minutes face-to-face with over 50% of the time spent counseling about how excess weight may  affect her health, improving dietary hygiene and habits, details of the bariatric surgery pathway and our expectations and requirements prior to any surgery, and coordination of treatment plan.      Answers for HPI/ROS submitted by the patient on 2/4/2019   BARIATRIC NEW PATIENT  Interested in Surgery?: Yes  [place] About?: Other  [place] Other:: [facility] recommendation  Barriers to learning:: No  Attended Seminar?: Yes  Date Attended:: 1/16/2019  Email for Newsletter:: [email or web address]  PCP:: Dr. Gilda Cardenas, Dr. Gela Hummel, [facility], [address] , [place] [address] , [phone]  Specialist:: Physical Therapist, Dr. Yazmin Yancey PT, DPT, [facility], [address] [place] [id number]  Mental Health Provider:: n/a  Present Ht:: 68.5  Present Wt:: 270  Age Overweight:: 12  Age 100lbs:: 11  Wt 18yrs:: 220  Wt 5yrs ago:: 265  # of Wt Loss Efforts:: 5+  Prescribed Wt Loss Meds?: Yes  Prescribed Meds:: phentermine  OTC Wt Loss Meds?: No  Surgeon Choice:: undecided  Procedure Choice:: Gastric Sleeve  Program1:: Dr. Gilda Cardenas -  Phentermine along with eating healthy diet  Start Date1:: 3/15/2018  Total Months1:: 11  Starting Wt1:: 288.5  Ending Wt1:: 270  Pounds Lost1:: 18.5  Program2:: low carb and exercise  Start Date2:: 6/20/2017  End Date2:: 11/20/2017  Total Months2:: 5  Starting Wt2:: 291  Ending Wt2:: 271.6  Pounds Lost2:: 19.4  Program3:: low calorie  Start Date3:: 12/18/2012  End Date3:: 3/25/2013  Total Months3:: 3  Starting Wt3:: 248  Ending Wt3:: 236  Pounds Lost3:: 12  Program4:: low calorie and exercise  Start Date4:: 10/1/2008  End Date4:: 8/1/2010  Total Months4:: 9  Starting Wt4:: 2  Ending Wt4:: 220  Pounds Lost4:: 215  Program5:: low calorie exercise  Start Date5:: 7/1/2007  End Date5:: 7/1/2008  Total Months5:: 12  Starting Wt5:: 260  Ending Wt5:: 210  Pounds Lost5:: 50

## 2021-06-23 NOTE — PROGRESS NOTES
Patient interested in Gastric Sleeve by Austyn Louis MD.  Insurance requirements and information discussed with the patient. Initial labs ordered and patient instructed on having them done as soon as possible.  Pt handbook given to patient.    Helped to set up future appointments.  See Bariatric Workflow.  Measurements taken today but photos were unavailable today, will need at upcoming appointment.  Patient verbalized understanding at this time without any questions.         Soledad Mccollum RN, [facility]  P [phone]  F [phone]

## 2021-06-24 ENCOUNTER — COMMUNICATION - HEALTHEAST (OUTPATIENT)
Dept: SURGERY | Facility: CLINIC | Age: 36
End: 2021-06-24

## 2021-06-24 DIAGNOSIS — E66.09 OBESITY DUE TO EXCESS CALORIES, UNSPECIFIED CLASSIFICATION, UNSPECIFIED WHETHER SERIOUS COMORBIDITY PRESENT: ICD-10-CM

## 2021-06-24 NOTE — PROGRESS NOTES
Patient was very polite, bright and cooperative.  She should be a good candidate for bariatric surgery.  She is doing welll with her eating behaviors.  She needss to take the MMPI, QOLI and MAST prior to my next appt with her.

## 2021-06-24 NOTE — PATIENT INSTRUCTIONS - HE
Patient has no psychopathology.   She is doing welll with her eating behaviors.  She needs to take the MMPI, QOLI and MAST prior to my next appt with her.  I will see her in a few weeks to go over her test results.

## 2021-06-24 NOTE — PROGRESS NOTES
Initial Structured Weight Loss Supervised Diet Evaluation     Assessment:  Pt. is a 33 y.o. female is being seen today for initial RD nutritional evaluation. Pt. has been unsuccessful with non-surgical weight loss methods and is interested in bariatric surgery. Today we reviewed current eating habits and level of physical activity, and instructed on the changes that are required for successful bariatric outcomes.    Pt desires bariatric surgery to improve health and lose weight. Pt recently left high stress job and eager to dedicate time towards living a healthy lifestyle.     Workflow review: Labs not complete, has not attended support group, psych scheduled.     Weight goal: at or below initial      Pt Active Problem List Diagnosis:  There is no problem list on file for this patient.  Chronic upper neck pain per bariatrician note 2/4/19.     Pt's Initial Weight: 270.5 lbs  Weight: (!) 267 lb (121.1 kg)  Weight loss from initial: 3.5  % Weight loss: 1.29 %    BMI: Body mass index is 40.6 kg/m .    Estimated RMR (Bacon-St Jeor equation): 1967 calories    Food allergies, intolerances, and Catholic customs: none     Vitamins   Educated on post-op vitamin regimen: Multi Vit + iron 2x/day, calcium citrate 500-600 mg 2x/day, 1425-1493 mcg of Sublingual B-12 daily, and 5000 IU Vitamin D3 daily.    Biggest struggle with weight loss: Stress, lack of exercise, skips meals, late night eating.     Diet/Weight History  Method or Diet: Exercise program  # loss(-) or gain(+): 50 lbs    Who does the grocery shopping for your household? Pt and financee   Who prepares your meals at home? Pt     Diet Recall/Time: Pt wakes up 7:00am  Breakfast: none  Am Snack: none   Lunch: 1pm- grocery store sweet potatoes, carrots, hot dish pork (15g)   Pm snack: none   Dinner: 7-8pm pizza, salad (15g)   HS Snack: none     Per Diet recall estimated protein: 30 grams      Fats used at home: olive oil     Fried Foods: 0-1 times per week    Meals  per week away from home: 2-3x/week   Sit down: 1x/week  Fast Food: 1x/week   Take Out: none   Delivery: none   Buffet: none   Cafeteria: 1-2x/week   Specialty Coffee: none       Portion Sizes problematic? Yes per patient/diet recall  Encouraged slowing meal times down, 20-30 minutes, chewing to applesauce consistency.   To aid in proper portion control and slow meal time down discussed consuming meals off smaller plates, use toddler/children utensils and set utensils down after each bite.    Protein, vegetables/fruits, carbohydrates:  Reviewed lean protein sources today. Recommended consuming 15-20gm protein at 3 meals daily    Beverages (Type/Oz. per day)  Water: 64 oz   Coffee: 2 cups   Tea: none   Milk: none   Regular soda: none   Diet soda: none   Juice: none   Justo-Aid/lemonade/etc: none   Alcohol: 4 glasses/weekend     Discussed the importance of adequate hydration after surgery and the goal of 64+ oz. of fluid daily.   The patient understands the importance of avoiding all carbonated, caffeinated and sweetened drinks; instead choosing 64 oz. plain water.    Fluid-meal separation:  The patient and I reviewed the anatomy of the bypass and why  fluids from a meal is so important.    Exercise  Nothing routine established.     Pt. s understands that 30-60 minutes of daily activity is an important part of bariatric surgery success.   Encouraged pt. to incorporate upper body strength training exercise, even if its lifting soup cans while watching TV at night, doing pushups/sit-ups, and abdominal work.    PES statement:   1. (NI-1.3)Excessive energy intake related to Food and nutrition related knowledge deficit concerning excessive energy/oral intake as evidenced by Intake of high caloric density foods; Estimated intake that exceeds estimated daily energy intake; Frequent excessive fast food or restaurant intake; and BMI 40.        Intervention  Discussion:    1. Educated pt on the [place] to Bariatric Success  handout.  2. Reviewed lean protein sources today. Recommended consuming 15-20gm protein at 3 meals daily  3. Discussed using a protein drink as a meal replacement   4. Gave food journal homework, to be completed for f/u appointment.  5. Bariatric Plate: The patient and I discussed the importance of including lean/low  fat protein at each meal and limiting carbohydrate intake to less  than 25% of plate volume.    Instructions/Goals:     1. Include 15-20gm lean protein at each meal.  2. Increase vegetable/fruit intake, by having a vegetable or fruit with each meal daily. Recommended pt to increase vegetable/fruit intake to 4-5 servings daily.  3. Increase fluid intake to 64oz daily: choose plain or calorie/carbonation-free beverages.  4. Incorporate daily structured activity, 30-60 minutes most days of the week  5. Fill out food journal and bring to next month's visit.  6. Practice plate method: 1/2 plate lean/low fat protein source, vegetable/fruit, <25% of plate complex carbohydrates.  7. Read food labels more consistently: keeping total fat grams <10, total sugar grams <10, fiber >3gm per serving.  8. Separate fluids 30 minutes before/after meal times.  9. Practice eating off of smaller plates/bowls, chewing to applesauce consistency, taking 20-30 minutes to eat in a calm/relaxed environment without distractions of tv/email/cell phone.    Handouts Provided:  Keys to Bariatric Success  Bariatric Plate  Food journal  Lean Protein sources  Food Label      Monitor/Evaluation:  Pt. s target weight: no gain from initial visit, pt. verbalized understanding.     Has realistic expectations for weight loss: Yes  Verbalizes understanding of dietary changes post procedure: Yes  Verbalizes understanding of supplement needs: Yes  Verbalizes willingness to participate in physical activity: Yes  Motivation for change: average  Client s predicted compliance on a scale of 1 (low) to 10 (high): 7  RD s prediction for client success  and compliance on a scale of 1 (low) to 10 (high):  7    Plan for next visit:   Review Bariatric plate and food journal homework.  Educate on dumping syndrome and reading food labels.  (Final Supervised Diet visit with RD) pre/post-op  diet progression, give review of surgery process.  Review [place] to Bariatric Success  Check Understanding Quiz  Final?    Time In: 7:30am  Time Out: 8:30am    ABN signed: Yes

## 2021-06-24 NOTE — TELEPHONE ENCOUNTER
I spoke with Donna this morning with regard to weight loss surgery and moving forward.  She has a [facility] plan through [employer].  They have now SWL requirement.  We discussed her need to be cleared by psych and nutrition, and completing her needs list that was provided to her by Dr. Branch at her initial visit before scheduling her surgical consult.  She has done her labs and would like to attend the support group at the  of  next Wednesday.  Just and VASQUEZI - she did meet with Dr. Branch and Nia in one month.

## 2021-06-25 NOTE — PATIENT INSTRUCTIONS - HE
Patient has no psychopathology.  She is very stable and is a very good candidate for bariatric surgery.

## 2021-06-25 NOTE — PROGRESS NOTES
Follow Up Surgical Weight Loss Supervised Diet Evaluation  Assessment:  Pt presents for follow up supervised diet visit with RD.    This patient is a 34 y.o.  She is being seen today for follow-up nutritional evaluation. Donan Bolanos has been unsuccessful with non-surgical weight loss methods and is interested in bariatric surgery. Today we reviewed the patients current eating habits and level of physical activity, and instructed on the changes that are required for successful bariatric outcomes.    Workflow review: psych follow up scheduled, attended support at [facility], labs complete.   Weight goal: at or below initial      Pt Active Problem List Diagnosis:   There is no problem list on file for this patient.  Chronic upper neck pain per bariatrician note 2/4/19.      Pt's Initial Weight: 270.5 lbs  Weight: (!) 268 lb (121.6 kg)  Weight loss from initial: 2.5  % Weight loss: 0.92 %    Body mass index is 40.75 kg/m .    Calculated RMR ([place]-St Jeor equation): 1967 calories    Progress made since last visit: Pt eating three meals a day and no longer skipping breakfast. She started using premier as a meal replacement and brought in completed food journal.     Concerns: Continued practice on mindful eating techniques.     Diet Recall/Time:   Breakfast: greek yogurt, string cheese, fruit (20g)  Am Snack: none   Lunch: 4 oz deli meat, 1 oz cheese, vegetables, tortilla (20g)  Pm snack: string cheese   Dinner: chicken soup with noodles and vegetables (15g)  HS Snack: none     Protein: 60-70 grams     Meals per week away from home: 1x/month      Recommended limiting eating out to no more than 2x/week.  Patient and I reviewed the importance of eating three consistent meals per day; as well as meal timing to be spaced 4-5 hours apart.  Snack choices: 100-150 calories (1-2x/day if physically hungry), incorporating a fruit/vegetable w/ protein source.    Meal Duration:20 minutes  Encouraged slowing meal times down, 20-30  minutes, chewing to applesauce consistency.     Portion Sizes problematic? No Per patient/diet recall   To aid in proper portion control and slow meal time down discussed consuming meals off smaller plates, use toddler/children utensils and set utensils down after each bite.    Protein, vegetables/fruits, carbohydrates:   The patient and I discussed the importance of including lean/low fat protein at each meal and limiting carbohydrate intake to less than 25% of plate volume.     Vitamins   Post-op vitamin regimen: Multi Vit + iron 2x/day, calcium citrate 500-600 mg 2x/day, 0815-4990 mcg of Sublingual B-12 daily, and 5000 IU Vitamin D3 daily.    Beverages (Type/Oz. per day)  Water: 8 glasses   Coffee: 0-1 cups   Tea: none   Milk: w/ cereal or granola   Regular soda: none   Diet soda: none   Juice: none   Justo-Aid/lemonade/etc: none   Alcohol: none     Discussed the importance of adequate hydration after surgery and the goal of 64+ oz of fluid daily.   The patient understands the importance of avoiding all carbonated, caffeinated and sweetened drinks; and instead choose 64 oz plain water.    Fluid-meal separation:   Pt is not working on  fluids 30min before and 30 minutes after meals.  Fluids are not  30min before and 30 minutes after meals.    Exercise  Yoga and goes to gym.     Pt's understands that 30-60 minutes of daily activity is an important part of bariatric surgery success.   Encouraged pt to incorporate upper body strength training exercise, even if its lifting soup cans while watching TV at night, doing pushups/sit-ups, and abdominal work.    PES statement:   1. (NI-1.3)Excessive energy intake related to Food and nutrition related knowledge deficit concerning excessive energy/oral intake as evidenced by Intake of high caloric density foods; Estimated intake that exceeds estimated daily energy intake;and BMI 40.       Intervention:  Discussion:   1. Reviewed the Keys to Bariatric Success  handout.  2. Educated on pre/post-op diet progression, post-op vitamin regimen, gave review of surgery process.  3. Dumping syndrome: choosing foods with less than 5-10gm fat/sugar per serving to avoid dumping syndrome for [facility] pts.  4. Reviewed Bariatric plate and food journal homework.    Instructions/Goals:   1. Include 15-20gm protein at each meal.  2. Increase vegetable/fruit intake, by having a vegetable or fruit with each meal daily. Recommended pt to increase vegetable/fruit intake to 4-5 servings daily.  3. Increase fluid intake to 64oz daily: choose plain or calorie/carbonation-free beverages.  4. Incorporate daily structured activity, 30-60 minutes most days of the week  5. Practice plate method: 1/2 plate lean/low fat protein source, vegetable/fruit, <25% of plate complex carbohydrates.  6. Read food labels more consistently: keeping total fat grams <10, total sugar grams <10, fiber >3gm per serving.  7. Separate fluids 30 minutes before/after meal times.  8. Practice eating off of smaller plates/bowls, chewing to applesauce consistency, taking 20-30 minutes to eat in a calm/relaxed environment without distractions of tv/email/cell phone.    Handouts provided:  Pt. [facility]ariatric [facility] Patient Handbook    Monitor/Evaluation:     Pt understands the importance of not gaining any weight from initial recorded weight.      Has realistic expectations for weight loss: Yes  Verbalizes understanding of dietary changes post procedure: Yes  Verbalizes understanding of supplement needs: Yes  Verbalizes willingness to participate in physical activity: Yes  Motivation for change: average  Client s predicted compliance on a scale of 1 (low) to 10 (high): 7  RD s prediction for client success and compliance on a scale of 1 (low) to 10 (high):  7    Pt has made the necessary changes, and is knowledgeable and well-informed of the dietary and physical activity requirements that are necessary for successful  bariatric outcomes. This Pt is an appropriate candidate for surgery from a nutrition standpoint at this time. The patient understands that surgery is a tool, and not a cure, and our aftercare program must be followed.    Time In: 11:30am  Time Out: 12:15pm      ABN signed: Yes

## 2021-06-25 NOTE — PROGRESS NOTES
Patient's MMPI, QOLI and MAST were all normal. She is also doing very well with her eating behaviors.

## 2021-06-30 ENCOUNTER — COMMUNICATION - HEALTHEAST (OUTPATIENT)
Dept: SURGERY | Facility: CLINIC | Age: 36
End: 2021-06-30

## 2021-07-03 NOTE — ADDENDUM NOTE
Addendum Note by Jamal Dias RN at 6/7/2019 10:00 AM     Author: Jamal Dias RN Service: -- Author Type: Registered Nurse    Filed: 6/7/2019  3:53 PM Encounter Date: 6/7/2019 Status: Signed    : Jamal Dias RN (Registered Nurse)    Addended by: JAMAL DIAS on: 6/7/2019 03:53 PM        Modules accepted: Orders

## 2021-07-04 NOTE — TELEPHONE ENCOUNTER
Telephone Encounter by Sheryl Watkins, RN at 6/30/2021  3:41 PM     Author: Sheryl Watkins RN Service: -- Author Type: Registered Nurse    Filed: 6/30/2021  3:44 PM Encounter Date: 6/30/2021 Status: Signed    : Sheryl Watkins RN (Registered Nurse)       Prior Authorization Request  Whos requesting:  Pharmacy  Pharmacy Name and Location: [facility]   Medication Name: Phentermine  Insurance Plan: [employer]  Insurance Member ID Number:  [id number]  CoverMyMeds Key: [id number]  Informed patient that prior authorizations can take up to 10 business days for response:   Yes  Okay to leave a detailed message: Yes

## 2021-07-04 NOTE — TELEPHONE ENCOUNTER
Telephone Encounter by Ashly Lee at 6/30/2021  4:08 PM     Author: Ashly Lee Service: -- Author Type: --    Filed: 6/30/2021  4:08 PM Encounter Date: 6/30/2021 Status: Signed    : Ashly Lee       [place] PA team  [phone]  Pool: [facility] MED ([id number])          PA has been initiated.       PA form completed and faxed insurance via Cover My Meds     Key:  [id number]     Medication:  Phentermine 37.5    Insurance:  [employer]         Response will be received via fax and may take up to 5-10 business days depending on plan

## 2021-07-04 NOTE — TELEPHONE ENCOUNTER
Telephone Encounter by Ashly Lee at 7/2/2021 10:42 AM     Author: Ashly Lee Service: -- Author Type: --    Filed: 7/2/2021 10:42 AM Encounter Date: 6/30/2021 Status: Signed    : Ashly Lee APPROVED:    Approval start date: 6/30/2021  Approval end date:  12/31/2021    Pharmacy has been notified of approval and will contact patient when medication is ready for pickup.

## 2021-07-19 ENCOUNTER — OFFICE VISIT (OUTPATIENT)
Dept: SURGERY | Facility: CLINIC | Age: 36
End: 2021-07-19
Payer: COMMERCIAL

## 2021-07-19 ENCOUNTER — LAB (OUTPATIENT)
Dept: LAB | Facility: CLINIC | Age: 36
End: 2021-07-19
Payer: COMMERCIAL

## 2021-07-19 VITALS
WEIGHT: 196 LBS | OXYGEN SATURATION: 98 % | DIASTOLIC BLOOD PRESSURE: 78 MMHG | HEART RATE: 82 BPM | SYSTOLIC BLOOD PRESSURE: 122 MMHG | HEIGHT: 69 IN | BODY MASS INDEX: 29.03 KG/M2

## 2021-07-19 DIAGNOSIS — K91.2 POSTOPERATIVE MALABSORPTION: ICD-10-CM

## 2021-07-19 DIAGNOSIS — Z90.3 HISTORY OF SLEEVE GASTRECTOMY: ICD-10-CM

## 2021-07-19 DIAGNOSIS — K21.9 GASTROESOPHAGEAL REFLUX DISEASE, UNSPECIFIED WHETHER ESOPHAGITIS PRESENT: ICD-10-CM

## 2021-07-19 DIAGNOSIS — K91.2 POSTOPERATIVE MALABSORPTION: Primary | ICD-10-CM

## 2021-07-19 LAB
ALBUMIN SERPL-MCNC: 4 G/DL (ref 3.5–5)
ALP SERPL-CCNC: 49 U/L (ref 45–120)
ALT SERPL W P-5'-P-CCNC: 14 U/L (ref 0–45)
ANION GAP SERPL CALCULATED.3IONS-SCNC: 13 MMOL/L (ref 5–18)
AST SERPL W P-5'-P-CCNC: 20 U/L (ref 0–40)
BILIRUB SERPL-MCNC: 0.7 MG/DL (ref 0–1)
BUN SERPL-MCNC: 9 MG/DL (ref 8–22)
CALCIUM SERPL-MCNC: 9 MG/DL (ref 8.5–10.5)
CHLORIDE BLD-SCNC: 104 MMOL/L (ref 98–107)
CO2 SERPL-SCNC: 24 MMOL/L (ref 22–31)
CREAT SERPL-MCNC: 0.72 MG/DL (ref 0.6–1.1)
FERRITIN SERPL-MCNC: 25 NG/ML (ref 10–130)
FOLATE SERPL-MCNC: >20 NG/ML
GFR SERPL CREATININE-BSD FRML MDRD: >90 ML/MIN/1.73M2
GLUCOSE BLD-MCNC: 78 MG/DL (ref 70–125)
HBA1C MFR BLD: 4.8 % (ref 0–5.6)
POTASSIUM BLD-SCNC: 4.1 MMOL/L (ref 3.5–5)
PROT SERPL-MCNC: 6.5 G/DL (ref 6–8)
PTH-INTACT SERPL-MCNC: 57 PG/ML (ref 10–86)
SODIUM SERPL-SCNC: 141 MMOL/L (ref 136–145)
VIT B12 SERPL-MCNC: 467 PG/ML (ref 213–816)

## 2021-07-19 PROCEDURE — 82728 ASSAY OF FERRITIN: CPT

## 2021-07-19 PROCEDURE — 83970 ASSAY OF PARATHORMONE: CPT

## 2021-07-19 PROCEDURE — 84425 ASSAY OF VITAMIN B-1: CPT | Mod: 90

## 2021-07-19 PROCEDURE — 82306 VITAMIN D 25 HYDROXY: CPT

## 2021-07-19 PROCEDURE — 83036 HEMOGLOBIN GLYCOSYLATED A1C: CPT

## 2021-07-19 PROCEDURE — 82607 VITAMIN B-12: CPT

## 2021-07-19 PROCEDURE — 99213 OFFICE O/P EST LOW 20 MIN: CPT | Performed by: EMERGENCY MEDICINE

## 2021-07-19 PROCEDURE — 84590 ASSAY OF VITAMIN A: CPT | Mod: 90

## 2021-07-19 PROCEDURE — 82746 ASSAY OF FOLIC ACID SERUM: CPT

## 2021-07-19 PROCEDURE — 84630 ASSAY OF ZINC: CPT | Mod: 90

## 2021-07-19 PROCEDURE — 99000 SPECIMEN HANDLING OFFICE-LAB: CPT

## 2021-07-19 PROCEDURE — 80053 COMPREHEN METABOLIC PANEL: CPT

## 2021-07-19 PROCEDURE — 36415 COLL VENOUS BLD VENIPUNCTURE: CPT

## 2021-07-19 RX ORDER — CHOLECALCIFEROL (VITAMIN D3) 125 MCG
CAPSULE ORAL
COMMUNITY
Start: 2021-01-01

## 2021-07-19 RX ORDER — GABAPENTIN 300 MG/1
300 CAPSULE ORAL
COMMUNITY
Start: 2021-06-25 | End: 2022-06-25

## 2021-07-19 RX ORDER — FERROUS FUMARATE 324(106)MG
TABLET ORAL
COMMUNITY
Start: 2019-11-30

## 2021-07-19 RX ORDER — PHENTERMINE HYDROCHLORIDE 37.5 MG/1
TABLET ORAL
COMMUNITY
Start: 2020-02-01 | End: 2021-07-25

## 2021-07-19 ASSESSMENT — MIFFLIN-ST. JEOR: SCORE: 1635.49

## 2021-07-19 NOTE — LETTER
7/19/2021         RE: Donna Bolanos  [address]  [place] [address]        Dear Colleague,    Thank you for referring your patient, Donna Bolanos, to the [facility]. Please see a copy of my visit note below.    Bariatric Follow Up Visit with a History of Previous Bariatric Surgery     Date of visit: 7/19/2021  Physician: Alfa Branch MD, MD  Primary Care Provider:  Gela Hummel  Donna Bolanos   36 year old  female    Date of Surgery: 5/7/19  Initial Weight: 270 lbs  Initial BMI: 40.8  Today's Weight:   Wt Readings from Last 1 Encounters:   12/23/20 83.5 kg (184 lb)     There is no height or weight on file to calculate BMI.      Assessment and Plan     Assessment: Donna is a 36 year old year old female who is just over 2 years s/p  Sleeve Gastrectomy with Dr. Louis  Donna Bolanos feels as if she has achieved the goals she hoped to accomplish through bariatric surgery and weight loss.  Plan:  1. Great work with over 70 lbs down from your preop weight of 270 lbs. Continue good mindful meals/protein and continue your neck treatments for improving.    2. Check labs today.    3. Consider Patch MD if needed for upset stomach with vitamins, DEKAs sample given today, one daily should suffice.    4. Continue omeprazole therapy and this year and given symptoms, we'll look into endoscopy with Dr. Louis later this year.    5. Phentermine going well, anticipate use for 3-6 months and then we'll taper off, check up in 3-6 months to re-evaluate progress, endoscopy review plan and vitamin switch.  No diagnosis found.      Current Outpatient Medications:      calcium-vitamin D (OSCAL) 250-125 MG-UNIT TABS per tablet, , Disp: , Rfl:      Cholecalciferol (VITAMIN D) 125 MCG (5000 UT) capsule, , Disp: , Rfl:      diclofenac (VOLTAREN) 1 % topical gel, Apply topically once or twice daily if needed for neck pain., Disp: , Rfl:      ferrous fumarate  (FERRETTS) 324 (106 Fe) MG TABS tablet, , Disp: , Rfl:      levonorgestrel (MIRENA) 20 MCG/24HR IUD, 1 each by Intrauterine route, Disp: , Rfl:      phentermine (ADIPEX-P) 37.5 MG tablet, Half tablet daily., Disp: , Rfl:      B Complex Vitamins (VITAMIN B COMPLEX PO), Take 1 drop by mouth daily., Disp: , Rfl:      calcium citrate-vitamin D (CITRACAL) 315-200 MG-UNIT TABS per tablet, Take 1 tablet by mouth, Disp: , Rfl:      cyanocobalamin 1000 MCG SUBL, Place 1,000 mcg under the tongue, Disp: , Rfl:      Multiple Vitamins-Minerals (MULTIVITAL) TABS, Take 1 tablet by mouth daily., Disp: , Rfl:         No follow-ups on file.    Bariatric Surgery Review     Interim History/LifeChanges: doing well. Phentermine tolerated as well. Finds it helpful.  Patient Concerns: same.  Appetite (1-10): good  GERD: no, on PPI.    Reviewed whether any need/indication for screening EGD today and we will defer until her 2 year visit for evaluating need.  Typically, a screening EGD is recommend post op year 2-3 if no symptoms to assess health of esophagus/bariatric surgery and sooner if difficult to control GERD or persistent pain/dysphagia sx despite behavior modification.    Medication changes:     Vitamin Intake:   B-12   3 days weekly   MVI  no longer using bariatric advantage, stomach would hurt. Using Women's one a day gummies recently.   Vitamin D  5000   Calcium   once or twice weekly occasionally.     Other  struggles with stomach   GERD sx using prilosec. If misses dose, GERD results.           LABS: ordered    Nausea some w/ vitamins  Vomiting n/a  Constipation n/a  Diarrhea n/a  Rashes no  Hair Loss some   Calf tenderness no  Breathing difficulty no  Reactive Hypoglycemia n/a  Light Headedness sometimes. Heat related   Moods good      Habits:  Alcohol: yes, weekends 4-6 weekend.  Tobacco: no  Caffeine less.  NSAIDS no  Exercise Routine: neck limits her, kid workouts and occ yoga. Walks 45 minutes regularly.  3 meals/day  "yes  Protein first yes  Near 60 grams/day  Water Separate from meals yes  Calorie Containing Beverages avoids  Restaurant eating/wk n/a  Sleeping good  Stressors : anxieties about family/world  CPAP: n/a  Contraception: IUD  DEXA:n/a.  Discussed annual screening to start at age 45 and continue to age 55 if scoring \"low risk\". DEXA scan recommended at age 55 regardless as long as at least 2 years have transpired from their bariatric surgery.    Social History     Social History     Socioeconomic History     Marital status:      Spouse name: Not on file     Number of children: Not on file     Years of education: Not on file     Highest education level: Not on file   Occupational History     Not on file   Tobacco Use     Smoking status: Former Smoker     Quit date: 2010     Years since quittin.4     Smokeless tobacco: Never Used   Substance and Sexual Activity     Alcohol use: Yes     Comment: 1 time per week 2-4 per time     Drug use: No     Sexual activity: Yes     Partners: Male     Birth control/protection: Condom, I.U.D.   Other Topics Concern     Not on file   Social History Narrative     Not on file     Social Determinants of Health     Financial Resource Strain:      Difficulty of Paying Living Expenses:    Food Insecurity:      Worried About Running Out of Food in the Last Year:      Ran Out of Food in the Last Year:    Transportation Needs:      Lack of Transportation (Medical):      Lack of Transportation (Non-Medical):    Physical Activity:      Days of Exercise per Week:      Minutes of Exercise per Session:    Stress:      Feeling of Stress :    Social Connections:      Frequency of Communication with Friends and Family:      Frequency of Social Gatherings with Friends and Family:      Attends [employer] Services:      Active Member of Clubs or Organizations:      Attends Club or Organization Meetings:      Marital Status:    Intimate Partner Violence:      Fear of Current or Ex-Partner:  "     Emotionally Abused:      Physically Abused:      Sexually Abused:        Past Medical History     Past Medical History:   Diagnosis Date     Obesity      Problem List     Patient Active Problem List   Diagnosis     Anal fistula     History of sleeve gastrectomy     Postoperative malabsorption     Medications     [unfilled]  Surgical History     Past Surgical History  She has a past surgical history that includes Pr Lap, Celso Restrict Proc, Longitudinal Gastrectomy (N/A, 5/7/2019); Pr Surg Diagnostic Exam, Anorectal (N/A, 7/2/2019); Incision And Drainage Perirectal Abscess (N/A, 10/31/2019); and Fissurectomy rectum (N/A, 12/27/2019).    Objective-Exam     Constitutional:  There were no vitals taken for this visit.  [unfilled]  General:  Pleasant and in no acute distress   Eyes:  EOMI  ENT:  Airway patent  Moist mucous membranes  Neck:  Supple, No LAD, No thyromegaly, No carotid bruits appreciated  Respiratory: Normal respiratory effort, no cough, wheezes or crackles  CV:  Regular rate and Rhythm,nomurmurs, pulses 2 plus, no calf tenderness, no LE edema  Gastrointestinal: Abdomen NT/ND, BS+  Musculoskeletal: muscle mass WNL  Skin: color no pallor hair mildly thinned, incisions nicely healed  Neurological: No tremor, normal gait  Psychiatric: alert and oriented X3, mood and affect normal    Counseling     We reviewed the important post op bariatric recommendations:  -eating 3 meals daily  -eating protein first, getting >60gm protein daily  -eating slowly, chewing food well  -avoiding/limiting calorie containing beverages  -drinking water 15-30 minutes before or after meals  -choosing wheat, not white with breads, crackers, pastas, edison, bagels, tortillas, rice  -limiting restaurant or cafeteria eating to twice a week or less    We discussed the importance of restorative sleep and stress management in maintaining a healthy weight.  We discussed the National Weight Control Registry healthy weight maintenance  strategies and ways to optimize metabolism.  We discussed the importance of physical activity including cardiovascular and strength training in maintaining a healthier weight.    We discussed the importance of life-long vitamin supplementation and life-long  follow-up.    Donna was reminded that, to avoid marginal ulcers she should avoid tobacco at all, alcohol in excess, caffeine in excess, and NSAIDS (unless indicated for cardioprotection or othewise and opposed by a PPI).    Alfa Branch MD    [facility].  2021  8:19 AM  [phone] (clinic phone)  [phone] (fax)    No images are attached to the encounter.  Medical Decision Makin minutes spent on the date of the encounter doing chart review, history and exam, documentation and further activities per the note      Again, thank you for allowing me to participate in the care of your patient.        Sincerely,        Alfa Branch MD

## 2021-07-19 NOTE — PATIENT INSTRUCTIONS
Plan:  1. Great work with over 70 lbs down from your preop weight of 270 lbs. Continue good mindful meals/protein and continue your neck treatments for improving.    2. Check labs today.    3. Consider Patch MD if needed for upset stomach with vitamins, DEKAs sample given today, one daily should suffice.    4. Continue omeprazole therapy and this year and given symptoms, we'll look into endoscopy with Dr. Louis later this year.    5. Phentermine going well, anticipate use for 3-6 months and then we'll taper off, check up in 3-6 months to re-evaluate progress, endoscopy review plan and vitamin switch.    [facility]  Nutritional Guidelines  Gastric Sleeve 18 Months Post Op and Beyond    General Guidelines and Helpful Hints:    Eat 3 meals per day + protein supplement(s). No snacks between meals.  o Do not skip meals.  This can cause overeating at the next meal and will prevent adequate protein and nutritional intake.    Aim for 60-80 grams of protein per day.  o Always eat your protein first. This assists with optimal nutrition and helps you stay full longer.  o Depending on your portion size, you may need to drink approved protein supplement between meals to achieve protein goals. Follow recommendations of your Dietitian.     Eat your protein first, and then follow with fiber.   o It is not necessary to count your fiber, but 15-20 grams per day is recommended.    o Add fiber by including fruits, vegetables, whole grains, and beans.     Portions should remain about 1 cup per meal. Use measuring cups to be accurate.    Continue to use saucer/salad plates, infant/toddler silverware to keep portion sizes small and take small bites.    Eat [id number] to make each meal last 20-30 minutes. Always stop eating when satisfied.    Continue to use caution with foods containing skins, peels or membranes. Chew well!    Aim for 64 oz. of calorie-free fluids daily.  o Continue to avoid caffeine and carbonation. If  you choose to drink alcohol, do so in moderation.   o Remember to avoid drinking during meals, 15-30 minutes before and 30 minutes after.    Exercise is sterling for continued weight loss and weight maintenance. Aim for 30-60 minutes of physical activity most days of the week. Include cardiovascular and strength training.    If having trouble tolerating meat, try using a crock-pot, tinfoil tent, steamer or other moist cooking method to create tender meats. Add broth or low-fat gravy to help meat stay moist.     Avoid high sugar and high fat foods to prevent high calorie intakes and a reduced rate of weight loss, or weight regain.  o Check nutrition labels for less than 10 grams of sugar and less than 10 grams of fat per serving.    Continue Taking Vitamins/Minerals:  o 4316-2828 mcg of Sublingual B-12 daily  o 1 Multivitamin with Iron twice daily (chewable or swallow tabs)  o 500-600 mg Calcium Citrate twice daily (chewable or swallow tabs)  o 5000 IU Vitamin D3 daily    Sample Grocery List    Protein:    Fat free Greek or light yogurt (less than 10 grams sugar)    Fat free or low-fat cottage cheese    String cheese or reduced fat cheese slices    Tuna, salmon, crab, egg, or chicken salad made with light or fat free mayonnaise    Egg or Egg Substitute    Lean/extra lean turkey, beef, bison, venison (ground, sirloin, round, flank)    Pork loin or tenderloin (grilled, baked, broiled)    Fish such as salmon, tuna, trout, tilapia, etc. (grilled, baked, broiled)    Tender cuts of lean (skinless) turkey or chicken    Lean deli meats: turkey, lean ham, chicken, lean roast beef    Beans such as kidney, garbanzo, black, verdugo, or low-fat/fat free refried beans    Peanut butter (natural preferred). Limit to 1 Tbsp. per day.    Low-fat meatloaf (made with lean ground beef or turkey)    Sloppy Joes made with low-sugar ketchup and lean ground beef or turkey    Soy or vegetable protein (i.e. vegan crumbles, soy/veggie burger,  tofu)    Hummus    Vegetables:    Fresh: cooked or raw (as tolerated)    Frozen vegetables    Canned vegetables (low sodium or no salt added, rinse before cooking/eating)    (Ok to have skins/peels/membranes/seeds - just chew well)    Fruits:    Fresh fruit    Frozen fruit (no sugar added)    Canned fruit (packed in its own juice, NOT syrup)    (Ok to have skins/peels/membranes/seeds - just chew well)    Starch:    Unsweetened whole-grain hot cereal (or high fiber cold cereal, dry)    Toasted whole wheat bread or [place] Thins    Whole grain crackers    Baked /boiled/mashed potato/sweet potato    Cooked whole grain pasta, brown rice, or other cooked whole grains    Starchy vegetables: corn, peas, winter squash    Protein Supplement:     Ready to drink protein shake with:  o 15-30 grams protein per serving  o Less than 10 grams total carbohydrate per serving     Protein powder mixed with:  o  Skim or 1% milk  o Low fat or fat free Lactaid milk, plain or no sugar added soymilk  o Water     Fats: (use in moderation)    1 teaspoon of soft tub margarine    1 teaspoon olive oil, canola oil, or peanut oil    1 tablespoon of low-fat ng or salad dressing     Sample Menu for 18+ months after Gastric Sleeve    You do NOT need to eat/drink the full portion sizes listed below  Always stop when you are satisfied    Breakfast   cup 1% cottage cheese     cup mixed berries   Lunch 2 oz lean roast beef on   [place] Thin with 1 tsp. light ng    small tomato, chopped, mixed with 1 tsp. light vinaigrette dressing   Supplement Approved protein supplement (if needed between meals)   Dinner 2 oz grilled salmon    cup salad greens with 1 tsp. light salad dressing and 1 tsp. ground flax seed    cup quinoa or brown rice     Breakfast   cup egg substitute with   cup sautéed chopped vegetables  2 light [place] Krisp crackers   Lunch Tuna Melt:   cup tuna mixed with 1 tsp. light ng over   [place] Thin. Top with 2-3 slices cucumber and 1 oz  slice of low fat cheese   Supplement 1 cup skim milk (if needed between meals)   Dinner 3 oz  grilled, broiled, or baked seasoned skinless chicken breast    cup asparagus     Breakfast   cup plain oatmeal made with skim or 1% milk with 1 Tbsp. flavored/unflavored protein powder added  1 mozzarella string cheese   Lunch 2 oz deli turkey breast  1/3 cup salad with 1 tsp. light salad dressing, 1/8 of a whole avocado and 1 Tbsp. sunflower seeds   Dinner 3 oz. pork loin made in a crock pot, seasoned with a spice rub    cup cooked carrots   Supplement Approved protein supplement (if needed between meals)     Breakfast 1 cup breakfast casserole made with egg substitute, turkey sausage,  and steamed, chopped bell peppers   Supplement  1 cup light Greek yogurt (if needed between meals)   Lunch 2 oz. teriyaki turkey    cup mashed sweet potato with 1-2 spritzes of spray butter (like [place])    cup fresh pineapple   Dinner 3 oz low fat meatloaf    cup roasted garlic zucchini     Breakfast   cup leftover breakfast casserole    cup no sugar added applesauce with 1 Tbsp. unflavored protein powder and a sprinkle of cinnamon    Lunch 3 oz shrimp with 1-2 Tbsp. low-sugar cocktail sauce for dipping    c. whole wheat pasta drizzled with   tsp. olive oil   Supplement 1 cup skim/1% milk with scoop of protein powder (if needed between meals)   Dinner Grilled, seasoned kebob with 2 oz lean beef and   cup vegetables     Breakfast Breakfast pizza:   [place] Thin spread with 1 Tbsp. low sugar spaghetti sauce,   cup shredded low fat cheese, melted and 1 slice of [place]     cup fresh fruit mixed with chopped almonds   Lunch   cup black bean soup  4-5 whole grain crackers   Dinner 3 oz  tilapia with lemon pepper seasoning    cup stewed tomatoes   Supplement 1 string cheese (if needed between meals)     Breakfast 2 hard boiled eggs (discard 1 egg yolk)    whole wheat English Muffin with 1 tsp. low sugar jelly   Lunch   cup leftover black  bean soup topped with 1-2 Tbsp. low fat cheese  2-3 light [place] Krisp crackers   Supplement Approved protein supplement (if needed between meals)   Dinner 3 oz sirloin steak    cup steamed broccoli       LEAN PROTEIN SOURCES  Getting 20-30 grams of protein, 3 meals daily, is appropriate for most people, some need more but more than about 40 grams per meal is not useful.  General rule is drinking one ounce of water per gram of protein eaten over the course of the day:  70 grams of protein each day, drink 70 oz of water.  Protein Source Portion Calories Grams of Protein                           Nonfat, plain Greek yogurt    (10 grams sugar or less) 3/4 cup (6 oz)  12-17   Light Yogurt (10 grams sugar or less) 3/4 cup (6 oz)  6-8   Protein Shake 1 shake 110-180 15-30   Skim/1% Milk or lactose-free milk 1 cup ( 8 oz)  8   Plain or light, flavored soymilk 1 cup  7-8   Plain or light, hemp milk 1 cup 110 6   Fat Free or 1% Cottage Cheese 1/2 cup 90 15   Part skim ricotta cheese 1/2 cup 100 14   Part skim or reduced fat cheese slices 1 ounce 65-80 8     Mozzarella String Cheese 1 80 8   Canned tuna, chicken, crab or salmon  (canned in water)  1/2 cup 100 15-20   White fish (broiled, grilled, baked) 3 ounces 100 21   [place]/Tuna (broiled, grilled, baked) 3 ounces 150-180 21   Shrimp, Scallops, Lobster, Crab 3 ounces 100 21   Pork loin, Pork Tenderloin 3 ounces 150 21   Boneless, skinless chicken /turkey breast                          (broiled, grilled, baked) 3 ounces 120 21   [place], [place], [place], and Venison 3 ounces 120 21   Lean cuts of red meat and pork (sirloin,   round, tenderloin, flank, ground 93%-96%) 3 ounces 170 21   Lean or Extra Lean Ground Turkey 1/2 cup 150 20   90-95% Lean [place] Burger 1 van 140-180 21   Low-fat casserole with lean meat 3/4 cup 200 17   Luncheon Meats                                                        (turkey, lean ham, roast beef, chicken) 3 ounces 100 21    Egg (boiled, poached, scrambled) 1 Egg 60 7   Egg Substitute 1/2 cup 70 10   Nuts (limit to 1 serving per day)  3 Tbsp. 150 7   Nut Omer (peanut, almond)  Limit to 1 serving or less daily 1 Tbsp. 90 4   Soy Burger (varies) 1  15   Garbanzo, Black, Cortés Beans 1/2 cup 110 7   Refried Beans 1/2 cup 100 7   Kidney and Lima beans 1/2 cup 110 7   Tempeh 3 oz 175 18   Vegan crumbles 1/2 cup 100 14   Tofu 1/2 cup 110 14   Chili (beans and extra lean beef or turkey) 1 cup 200 23   Lentil Stew/Soup 1 cup 150 12   Black Bean Soup 1 cup 175 12       Exercise Guidance    Nearly everything that bothers us gets better when the proper amount of exercise can be done in the proper amounts.  Getting to that level safely and without injury is the key.  When it comes to weight loss, exercise is especially important in maintaining the weight loss.  Unfortunately, one of the harsh realities is that substantial weight loss slows our metabolism, often anywhere from 5-20%.    Our brain always remembers our heaviest weight and we can return to that if we're not mindful and moving regularly.  Our biology doesn't understand the concept of having too much energy, only not having enough.  As such, when we lose weight, it's thought that the brain interprets this as we're ill or in a famine and dials back our metabolism to limit further weight loss.  This is why exercise is so important in keeping the weight off and is the main reason people have some weight regain from their low weight point after weight loss.  We have to make up that 10-20% of calories not being burned.Since we can restrict our intake for only so long, exercise becomes very important in our long term healthy weigh maintenance to balance out the occasional indiscretion with our diet.    Generally, for every 5% body weight reduction in a weight loss season, a person needs to add  kilocalories of exercise in their daily routine to keep that weight off for the long  term.  This is why it's vital to be starting your fitness regimen during weight loss season, so that routine is well established as you move into your maintenance period.    Additionally, all sorts of good enzymes and genes turn on with exercise and our stress, sleep, mood and bodies feel better when we can get to the point of making ourselves a little sweaty and short of breath 35-50 minutes most days of the week. But we have to start with what we can do first and give ourselves permission to work our way up to this goal.    Who isn't ready for exercise? Well, if you get severe dizziness/palpitations, chest pain or short of breath/faint with even minimal activity like walking across a room or you're having to pause while going up a flight of stairs, then getting your heart and/or lungs fully evaluated prior to starting an exercise regimen is recommended. Everyone else can probably start a program, but everyone may start at a different point:  Some can set a 5-10 minute walking goal and others will be able to ride their bike for an hour.      Start with where you're at and look to add 10% more each week until you're at that 150 minutes or more a week (or 75 minutes/week or more of vigorous exercise). Moderate exercise can be estimated as the pace you can carry on a conversation and vigorous is the pace at which you can get 3-5 words out before having to take a breath.  If you're using heart rate monitoring, Moderate is about 60% of your maximum heart rate and vigorous about 75%. (Max heart rate estimated as 220 beats minus your age:  Example: 220-age of 44 =176 Beats per minute (BPM) maximum. 0.6X 176= 105 BPM (moderate), 132 BMP(vigorous)).    If you like to count steps, the 10,000 steps per day does correlate well with weight maintenance but try to make at least 20-25% of those steps at a brisk pace (like you are about to miss your bus).    Finally, if you are pressed for time, it's important to know that some  "exercise is better than none.  High Intensity Interval training (HIIT) is a good way to get as much out of a short period of working out. If you can't walk, use the stairs, bike or swim; you could use a punching/arm workout regimen for your activity.  The idea with HIIT is to have a 3-6 minute warm up period of low intensity and the 3-6 \"intervals\" where you push the intensity up and then recover and start the next interval. One study showed that 3 intervals of 20 seconds at \"Maximum Effort\" while either biking on a stationary bike or going up stairs and then having 100 seconds recovery time before the next Maximum Effort was equally as beneficial on cardiovascular fitness development as doing 30 minutes of moderately paced walking 3 days weekly over a 6 week period of time.  So intensity matters. You just need to be able to safely do your desired exercise without injury. There are many great HIIT exercises/routines out there. IF you're not doing much exercise currently, I recommend giving your self 2-3 weeks of moderate exercise, 3 days weekly minimum to get your bones/tendons/muscles used to exercise before going for High Intensity workouts.    If you like to use Apps on the phone, the couch to 5k martinez and 7 minute workout apps are nice places to start if you are reasonably healthy.  There are hundreds of other options out there.  Consider viewing [employer] if gentler exercise/movement is desired. Videos on Willie Chi and chair yoga for seniors exist and are free. Check them out and let's get that 3-4 days a week routine going.    Let's move!  Alfa Branch MD.       Information about the Weight Loss Medication Phentermine    When combined with mindful eating and behavior changes, weight loss medications can be a nice additional tool to maximize your weight loss season.  There are no magic pills and without diet and behavior changes, weight loss will be minimal.  Think of this medication as a tool to make your diet and " "behavior changes easier and you'll enjoy a higher probability of success.  Remember not to skip meals, but use this medication to tolerate your reduced calories more easily.  If you are very hungry in the evenings, you are likely not eating enough in the first 10 hours of your day and need to focus on getting your protein requirements in at each of your 3 daily meals.      Phentermine is a stimulant medication related to the amphetamine class of medication but with a lower risk of dependence and addiction.  It is used for weight loss by suppressing the appetite region of the brain.  It also may speed up the metabolic rate and give a person more energy.  Like any medication there are potential side effects and the most common are:  Dry mouth occurs in almost everyone (hydrate well), fewer people experience Palpatations, fast heart rate, elevation of blood pressure, restlessness, insomnia, dizziness, change in mood, tremor, headache, changes in bowel movements,itchiness, changes in sex drive.  If you are or may have become pregnant, do not use phentermine as it increases the risk for birth defects/miscarriage.  Do not use if breastfeeding.    Some people can develop serious side effects which include:  Heart strain (\"ischemia\").  Tachycardia (fast heart rate or irregular heart rate).  Hypertension  Pulmonary Hypertension  Psychosis  Dependency and abuse has occurred in some.  If you've been on high dose (37.5mg) for long periods, phentermine should be tapered down over a few weeks before abruptly stopping as seizures have been reported rarely.    We do not recommend taking it in combination with the following medications due to potential drug interactions which can increase the risk of side effects and/or potential for seizures:    Absolutely contraindicated are:  Amphetamines or other stimulants like ADHD medication: (dextroamphetamine, amphetamine, diethylpropion, isocarboxazid, methamphetamine, lisdexamfetamine, " benzphetamine,dexmethylphenidate, methylphenidate, selegiline patch, sibutramine, tranylcypromine.    Avoid use with:   Dopamine, dobutamine, ephedra, ephedrine, epinephrine, isoproterenol, linezolid, norepinephrine, phenylephrine injection, venlafaxine (Effexor).    Monitor or modify dose with:  Acebutolol, atenolol, betaxolol, bisoprolol, carvedilol, droxidopa, esmolol, labetalol, magnesium citrate, metoprolol, nadolol, nebivolol, penbutolol, pindolol, propranolol, sotalol, timolol.    Caution with: armodafinil,betaxolol eye drops, brexpiprazole, bupropion, busulfan, caffeine, carteolol drops, enzalutaminde, ginseng, green tea, guarana, levobunolol drops, lindane cream, modafinil, afrin nasal spray (oxymetazoline), pamabrom, phenylephrine oral and nasal spray, pseudoephedrine (sudafed), rasgiline, sleegiline, bowel prep, tiagabine, timolol drops,  TRAMADOL due to increased risk of seizures.    The current cheapest place to fill your prescription is at [facility] or [place] pharmacy,[facility] or [facility] and is around $22for 90 tablets.  Occasionally, [employer] and [employer] have price matched, so call around and get the best price for you.  Other pharmacies may charge closer to$70- $100 for the same prescription. You don't have to be a member to use the pharmacy at [facility] currently.  An alternative some patients have tried is using a voucher system through [employer].  $25 paid on their website gets you a  voucher that can allows you to pick your meds up at [facility] without paying anything more.  [employer] may also offer discounted coupons and give prices around you.    Dosing:  We start with half a tablet for the first 2-3 weeks and if tolerating it without problems, you can take up to one full tablet daily in the morning after breakfast.  For those with evening hunger problems, sometimes half a tablet in the morning and half a tablet around 1 pm can be effective, however, risks of nighttime  "insomnia/restless increase with afternoon dosing so call me at the clinic if considering this regimen or having any issues.  You only have to use the amount effective for you, not to exceed one full tablet.  It can also be used situationaly and does not have to be taken every day. For more sensitive individuals,: get a pill cutter and cut the half tab into quarters and use a quarter of a tablet, about 9mg, for a couple weeks before increasing to half a tablet (18.75mg) if needed.  As always, if any questions give us a call at the [facility] [place] [facility] telephone:  [phone].     Don't use Phentermine if sick/ill or using other stimulants/cold medications and it's OK to skip days that you don't feel the need for appetite suppressant assistance.  This medication works the day you take it and doesn't require \"building up\" in the system.    "

## 2021-07-19 NOTE — PROGRESS NOTES
Bariatric Follow Up Visit with a History of Previous Bariatric Surgery     Date of visit: 7/19/2021  Physician: Alfa Branch MD, MD  Primary Care Provider:  Gela Hummel   36 year old  female    Date of Surgery: 5/7/19  Initial Weight: 270 lbs  Initial BMI: 40.8  Today's Weight:   Wt Readings from Last 1 Encounters:   12/23/20 83.5 kg (184 lb)     There is no height or weight on file to calculate BMI.      Assessment and Plan     Assessment: Donna is a 36 year old year old female who is just over 2 years s/p  Sleeve Gastrectomy with Dr. Heriberto Bolanos feels as if she has achieved the goals she hoped to accomplish through bariatric surgery and weight loss.  Plan:  1. Great work with over 70 lbs down from your preop weight of 270 lbs. Continue good mindful meals/protein and continue your neck treatments for improving.    2. Check labs today.    3. Consider Patch MD if needed for upset stomach with vitamins, DEKAs sample given today, one daily should suffice.    4. Continue omeprazole therapy and this year and given symptoms, we'll look into endoscopy with Dr. Louis later this year.    5. Phentermine going well, anticipate use for 3-6 months and then we'll taper off, check up in 3-6 months to re-evaluate progress, endoscopy review plan and vitamin switch.  No diagnosis found.      Current Outpatient Medications:      calcium-vitamin D (OSCAL) 250-125 MG-UNIT TABS per tablet, , Disp: , Rfl:      Cholecalciferol (VITAMIN D) 125 MCG (5000 UT) capsule, , Disp: , Rfl:      diclofenac (VOLTAREN) 1 % topical gel, Apply topically once or twice daily if needed for neck pain., Disp: , Rfl:      ferrous fumarate (FERRETTS) 324 (106 Fe) MG TABS tablet, , Disp: , Rfl:      levonorgestrel (MIRENA) 20 MCG/24HR IUD, 1 each by Intrauterine route, Disp: , Rfl:      phentermine (ADIPEX-P) 37.5 MG tablet, Half tablet daily., Disp: , Rfl:      B Complex Vitamins (VITAMIN B COMPLEX PO), Take 1 drop by  mouth daily., Disp: , Rfl:      calcium citrate-vitamin D (CITRACAL) 315-200 MG-UNIT TABS per tablet, Take 1 tablet by mouth, Disp: , Rfl:      cyanocobalamin 1000 MCG SUBL, Place 1,000 mcg under the tongue, Disp: , Rfl:      Multiple Vitamins-Minerals (MULTIVITAL) TABS, Take 1 tablet by mouth daily., Disp: , Rfl:         No follow-ups on file.    Bariatric Surgery Review     Interim History/LifeChanges: doing well. Phentermine tolerated as well. Finds it helpful.  Patient Concerns: same.  Appetite (1-10): good  GERD: no, on PPI.    Reviewed whether any need/indication for screening EGD today and we will defer until her 2 year visit for evaluating need.  Typically, a screening EGD is recommend post op year 2-3 if no symptoms to assess health of esophagus/bariatric surgery and sooner if difficult to control GERD or persistent pain/dysphagia sx despite behavior modification.    Medication changes:     Vitamin Intake:   B-12   3 days weekly   MVI  no longer using bariatric advantage, stomach would hurt. Using Women's one a day gummies recently.   Vitamin D  5000   Calcium   once or twice weekly occasionally.     Other  struggles with stomach   GERD sx using prilosec. If misses dose, GERD results.           LABS: ordered    Nausea some w/ vitamins  Vomiting n/a  Constipation n/a  Diarrhea n/a  Rashes no  Hair Loss some   Calf tenderness no  Breathing difficulty no  Reactive Hypoglycemia n/a  Light Headedness sometimes. Heat related   Moods good      Habits:  Alcohol: yes, weekends 4-6 weekend.  Tobacco: no  Caffeine less.  NSAIDS no  Exercise Routine: neck limits her, kid workouts and occ yoga. Walks 45 minutes regularly.  3 meals/day yes  Protein first yes  Near 60 grams/day  Water Separate from meals yes  Calorie Containing Beverages avoids  Restaurant eating/wk n/a  Sleeping good  Stressors : anxieties about family/world  CPAP: n/a  Contraception: IUD  DEXA:n/a.  Discussed annual screening to start at age 45 and  "continue to age 55 if scoring \"low risk\". DEXA scan recommended at age 55 regardless as long as at least 2 years have transpired from their bariatric surgery.    Social History     Social History     Socioeconomic History     Marital status:      Spouse name: Not on file     Number of children: Not on file     Years of education: Not on file     Highest education level: Not on file   Occupational History     Not on file   Tobacco Use     Smoking status: Former Smoker     Quit date: 2010     Years since quittin.4     Smokeless tobacco: Never Used   Substance and Sexual Activity     Alcohol use: Yes     Comment: 1 time per week 2-4 per time     Drug use: No     Sexual activity: Yes     Partners: Male     Birth control/protection: Condom, I.U.D.   Other Topics Concern     Not on file   Social History Narrative     Not on file     Social Determinants of Health     Financial Resource Strain:      Difficulty of Paying Living Expenses:    Food Insecurity:      Worried About Running Out of Food in the Last Year:      Ran Out of Food in the Last Year:    Transportation Needs:      Lack of Transportation (Medical):      Lack of Transportation (Non-Medical):    Physical Activity:      Days of Exercise per Week:      Minutes of Exercise per Session:    Stress:      Feeling of Stress :    Social Connections:      Frequency of Communication with Friends and Family:      Frequency of Social Gatherings with Friends and Family:      Attends Orthodox Services:      Active Member of Clubs or Organizations:      Attends Club or Organization Meetings:      Marital Status:    Intimate Partner Violence:      Fear of Current or Ex-Partner:      Emotionally Abused:      Physically Abused:      Sexually Abused:        Past Medical History     Past Medical History:   Diagnosis Date     Obesity      Problem List     Patient Active Problem List   Diagnosis     Anal fistula     History of sleeve gastrectomy     Postoperative " malabsorption     Medications     [unfilled]  Surgical History     Past Surgical History  She has a past surgical history that includes Pr Lap, Celso Restrict Proc, Longitudinal Gastrectomy (N/A, 5/7/2019); Pr Surg Diagnostic Exam, Anorectal (N/A, 7/2/2019); Incision And Drainage Perirectal Abscess (N/A, 10/31/2019); and Fissurectomy rectum (N/A, 12/27/2019).    Objective-Exam     Constitutional:  There were no vitals taken for this visit.  [unfilled]  General:  Pleasant and in no acute distress   Eyes:  EOMI  ENT:  Airway patent  Moist mucous membranes  Neck:  Supple, No LAD, No thyromegaly, No carotid bruits appreciated  Respiratory: Normal respiratory effort, no cough, wheezes or crackles  CV:  Regular rate and Rhythm,nomurmurs, pulses 2 plus, no calf tenderness, no LE edema  Gastrointestinal: Abdomen NT/ND, BS+  Musculoskeletal: muscle mass WNL  Skin: color no pallor hair mildly thinned, incisions nicely healed  Neurological: No tremor, normal gait  Psychiatric: alert and oriented X3, mood and affect normal    Counseling     We reviewed the important post op bariatric recommendations:  -eating 3 meals daily  -eating protein first, getting >60gm protein daily  -eating slowly, chewing food well  -avoiding/limiting calorie containing beverages  -drinking water 15-30 minutes before or after meals  -choosing wheat, not white with breads, crackers, pastas, edison, bagels, tortillas, rice  -limiting restaurant or cafeteria eating to twice a week or less    We discussed the importance of restorative sleep and stress management in maintaining a healthy weight.  We discussed the National Weight Control Registry healthy weight maintenance strategies and ways to optimize metabolism.  We discussed the importance of physical activity including cardiovascular and strength training in maintaining a healthier weight.    We discussed the importance of life-long vitamin supplementation and life-long  follow-upGarima wheat  reminded that, to avoid marginal ulcers she should avoid tobacco at all, alcohol in excess, caffeine in excess, and NSAIDS (unless indicated for cardioprotection or othewise and opposed by a PPI).    Alfa Branch MD    [facility].  2021  8:19 AM  [phone] (clinic phone)  [phone] (fax)    No images are attached to the encounter.  Medical Decision Makin minutes spent on the date of the encounter doing chart review, history and exam, documentation and further activities per the note

## 2021-07-20 ENCOUNTER — TELEPHONE (OUTPATIENT)
Dept: SURGERY | Facility: CLINIC | Age: 36
End: 2021-07-20

## 2021-07-20 LAB — DEPRECATED CALCIDIOL+CALCIFEROL SERPL-MC: 69 UG/L (ref 30–80)

## 2021-07-21 LAB
ANNOTATION COMMENT IMP: NORMAL
RETINYL PALMITATE SERPL-MCNC: 0.04 MG/L
VIT A SERPL-MCNC: 0.94 MG/L
ZINC SERPL-MCNC: 96.4 UG/DL

## 2021-07-22 LAB — VIT B1 PYROPHOSHATE BLD-SCNC: 118 NMOL/L

## 2021-07-30 DIAGNOSIS — K21.9 GASTROESOPHAGEAL REFLUX DISEASE, UNSPECIFIED WHETHER ESOPHAGITIS PRESENT: Primary | ICD-10-CM

## 2021-07-30 DIAGNOSIS — Z90.3 HISTORY OF SLEEVE GASTRECTOMY: ICD-10-CM

## 2021-08-03 PROBLEM — E66.01 MORBID OBESITY DUE TO EXCESS CALORIES (H): Status: RESOLVED | Noted: 2019-05-07 | Resolved: 2019-11-27

## 2021-09-25 ENCOUNTER — HEALTH MAINTENANCE LETTER (OUTPATIENT)
Age: 36
End: 2021-09-25

## 2021-10-06 ENCOUNTER — PREP FOR PROCEDURE (OUTPATIENT)
Dept: SURGERY | Facility: CLINIC | Age: 36
End: 2021-10-06

## 2021-10-06 DIAGNOSIS — Z98.84 S/P BARIATRIC SURGERY: Primary | ICD-10-CM

## 2021-10-11 DIAGNOSIS — Z11.59 ENCOUNTER FOR SCREENING FOR OTHER VIRAL DISEASES: ICD-10-CM

## 2021-10-25 ENCOUNTER — VIRTUAL VISIT (OUTPATIENT)
Dept: SURGERY | Facility: CLINIC | Age: 36
End: 2021-10-25
Payer: COMMERCIAL

## 2021-10-25 VITALS — BODY MASS INDEX: 29.03 KG/M2 | HEIGHT: 69 IN | WEIGHT: 196 LBS

## 2021-10-25 DIAGNOSIS — Z90.3 HISTORY OF SLEEVE GASTRECTOMY: Primary | ICD-10-CM

## 2021-10-25 DIAGNOSIS — M54.2 NECK PAIN: ICD-10-CM

## 2021-10-25 PROCEDURE — 99213 OFFICE O/P EST LOW 20 MIN: CPT | Mod: GT | Performed by: EMERGENCY MEDICINE

## 2021-10-25 RX ORDER — PHENTERMINE HYDROCHLORIDE 37.5 MG/1
TABLET ORAL
COMMUNITY
Start: 2021-09-28 | End: 2021-10-25

## 2021-10-25 RX ORDER — PHENTERMINE HYDROCHLORIDE 15 MG/1
CAPSULE ORAL
COMMUNITY
End: 2021-10-25

## 2021-10-25 RX ORDER — TRAZODONE HYDROCHLORIDE 50 MG/1
TABLET, FILM COATED ORAL
COMMUNITY
Start: 2021-09-28

## 2021-10-25 ASSESSMENT — MIFFLIN-ST. JEOR: SCORE: 1635.49

## 2021-10-25 NOTE — PATIENT INSTRUCTIONS
Plan:  1. Great work with maintaining 74 lbs of weight loss over the last 2.5 years. Given some loss of efficacy and some increased family tension/sleep issues, it's reasonable to come off the phentermine and give your body a break. We'll look to keep your weight in the 190s. If weight is ramping up 8-10 lbs let me know and we can look at Contrave therapy or Saxenda/Wegovy injections in the future if needed. Type of med may depend on what your insurance covers well as the injection therapy is not affordable without insurance coverage.    2. Continue good vitamin usage. If having plastic surgery, it's reasonable to check some vitamin labs the month prior to make sure your patch MD product and other supplements are giving you adequate supplementation. Let me know.    3. Continue neck/upper back exercises, finding that morning/daily routine.      Recheck in 3-6 months. Annual check in May/June.    [facility]  Nutritional Guidelines  Gastric Sleeve 18 Months Post Op and Beyond    General Guidelines and Helpful Hints:    Eat 3 meals per day + protein supplement(s). No snacks between meals.  o Do not skip meals.  This can cause overeating at the next meal and will prevent adequate protein and nutritional intake.    Aim for 60-80 grams of protein per day.  o Always eat your protein first. This assists with optimal nutrition and helps you stay full longer.  o Depending on your portion size, you may need to drink approved protein supplement between meals to achieve protein goals. Follow recommendations of your Dietitian.     Eat your protein first, and then follow with fiber.   o It is not necessary to count your fiber, but 15-20 grams per day is recommended.    o Add fiber by including fruits, vegetables, whole grains, and beans.     Portions should remain about 1 cup per meal. Use measuring cups to be accurate.    Continue to use saucer/salad plates, infant/toddler silverware to keep portion sizes small and  take small bites.    Eat [id number] to make each meal last 20-30 minutes. Always stop eating when satisfied.    Continue to use caution with foods containing skins, peels or membranes. Chew well!    Aim for 64 oz. of calorie-free fluids daily.  o Continue to avoid caffeine and carbonation. If you choose to drink alcohol, do so in moderation.   o Remember to avoid drinking during meals, 15-30 minutes before and 30 minutes after.    Exercise is sterling for continued weight loss and weight maintenance. Aim for 30-60 minutes of physical activity most days of the week. Include cardiovascular and strength training.    If having trouble tolerating meat, try using a crock-pot, tinfoil tent, steamer or other moist cooking method to create tender meats. Add broth or low-fat gravy to help meat stay moist.     Avoid high sugar and high fat foods to prevent high calorie intakes and a reduced rate of weight loss, or weight regain.  o Check nutrition labels for less than 10 grams of sugar and less than 10 grams of fat per serving.    Continue Taking Vitamins/Minerals:  o 9426-5268 mcg of Sublingual B-12 daily  o 1 Multivitamin with Iron twice daily (chewable or swallow tabs)  o 500-600 mg Calcium Citrate twice daily (chewable or swallow tabs)  o 5000 IU Vitamin D3 daily    Sample Grocery List    Protein:    Fat free Greek or light yogurt (less than 10 grams sugar)    Fat free or low-fat cottage cheese    String cheese or reduced fat cheese slices    Tuna, salmon, crab, egg, or chicken salad made with light or fat free mayonnaise    Egg or Egg Substitute    Lean/extra lean turkey, beef, bison, venison (ground, sirloin, round, flank)    Pork loin or tenderloin (grilled, baked, broiled)    Fish such as salmon, tuna, trout, tilapia, etc. (grilled, baked, broiled)    Tender cuts of lean (skinless) turkey or chicken    Lean deli meats: turkey, lean ham, chicken, lean roast beef    Beans such as kidney, garbanzo, black, verdugo, or  low-fat/fat free refried beans    Peanut butter (natural preferred). Limit to 1 Tbsp. per day.    Low-fat meatloaf (made with lean ground beef or turkey)    Sloppy Joes made with low-sugar ketchup and lean ground beef or turkey    Soy or vegetable protein (i.e. vegan crumbles, soy/veggie burger, tofu)    Hummus    Vegetables:    Fresh: cooked or raw (as tolerated)    Frozen vegetables    Canned vegetables (low sodium or no salt added, rinse before cooking/eating)    (Ok to have skins/peels/membranes/seeds - just chew well)    Fruits:    Fresh fruit    Frozen fruit (no sugar added)    Canned fruit (packed in its own juice, NOT syrup)    (Ok to have skins/peels/membranes/seeds - just chew well)    Starch:    Unsweetened whole-grain hot cereal (or high fiber cold cereal, dry)    Toasted whole wheat bread or [place] Thins    Whole grain crackers    Baked /boiled/mashed potato/sweet potato    Cooked whole grain pasta, brown rice, or other cooked whole grains    Starchy vegetables: corn, peas, winter squash    Protein Supplement:     Ready to drink protein shake with:  o 15-30 grams protein per serving  o Less than 10 grams total carbohydrate per serving     Protein powder mixed with:  o  Skim or 1% milk  o Low fat or fat free Lactaid milk, plain or no sugar added soymilk  o Water     Fats: (use in moderation)    1 teaspoon of soft tub margarine    1 teaspoon olive oil, canola oil, or peanut oil    1 tablespoon of low-fat ng or salad dressing     Sample Menu for 18+ months after Gastric Sleeve    You do NOT need to eat/drink the full portion sizes listed below  Always stop when you are satisfied    Breakfast   cup 1% cottage cheese     cup mixed berries   Lunch 2 oz lean roast beef on   [place] Thin with 1 tsp. light ng    small tomato, chopped, mixed with 1 tsp. light vinaigrette dressing   Supplement Approved protein supplement (if needed between meals)   Dinner 2 oz grilled salmon    cup salad greens with 1 tsp.  light salad dressing and 1 tsp. ground flax seed    cup quinoa or brown rice     Breakfast   cup egg substitute with   cup sautéed chopped vegetables  2 light [place] Krisp crackers   Lunch Tuna Melt:   cup tuna mixed with 1 tsp. light ng over   [place] Thin. Top with 2-3 slices cucumber and 1 oz slice of low fat cheese   Supplement 1 cup skim milk (if needed between meals)   Dinner 3 oz  grilled, broiled, or baked seasoned skinless chicken breast    cup asparagus     Breakfast   cup plain oatmeal made with skim or 1% milk with 1 Tbsp. flavored/unflavored protein powder added  1 mozzarella string cheese   Lunch 2 oz deli turkey breast  1/3 cup salad with 1 tsp. light salad dressing, 1/8 of a whole avocado and 1 Tbsp. sunflower seeds   Dinner 3 oz. pork loin made in a crock pot, seasoned with a spice rub    cup cooked carrots   Supplement Approved protein supplement (if needed between meals)     Breakfast 1 cup breakfast casserole made with egg substitute, turkey sausage,  and steamed, chopped bell peppers   Supplement  1 cup light Greek yogurt (if needed between meals)   Lunch 2 oz. teriyaki turkey    cup mashed sweet potato with 1-2 spritzes of spray butter (like [place])    cup fresh pineapple   Dinner 3 oz low fat meatloaf    cup roasted garlic zucchini     Breakfast   cup leftover breakfast casserole    cup no sugar added applesauce with 1 Tbsp. unflavored protein powder and a sprinkle of cinnamon    Lunch 3 oz shrimp with 1-2 Tbsp. low-sugar cocktail sauce for dipping    c. whole wheat pasta drizzled with   tsp. olive oil   Supplement 1 cup skim/1% milk with scoop of protein powder (if needed between meals)   Dinner Grilled, seasoned kebob with 2 oz lean beef and   cup vegetables     Breakfast Breakfast pizza:   [place] Thin spread with 1 Tbsp. low sugar spaghetti sauce,   cup shredded low fat cheese, melted and 1 slice of [place] cabral     cup fresh fruit mixed with chopped almonds   Lunch   cup black bean  soup  4-5 whole grain crackers   Dinner 3 oz  tilapia with lemon pepper seasoning    cup stewed tomatoes   Supplement 1 string cheese (if needed between meals)     Breakfast 2 hard boiled eggs (discard 1 egg yolk)    whole wheat English Muffin with 1 tsp. low sugar jelly   Lunch   cup leftover black bean soup topped with 1-2 Tbsp. low fat cheese  2-3 light [place] Krisp crackers   Supplement Approved protein supplement (if needed between meals)   Dinner 3 oz sirloin steak    cup steamed broccoli       LEAN PROTEIN SOURCES  Getting 20-30 grams of protein, 3 meals daily, is appropriate for most people, some need more but more than about 40 grams per meal is not useful.  General rule is drinking one ounce of water per gram of protein eaten over the course of the day:  70 grams of protein each day, drink 70 oz of water.  Protein Source Portion Calories Grams of Protein                           Nonfat, plain Greek yogurt    (10 grams sugar or less) 3/4 cup (6 oz)  12-17   Light Yogurt (10 grams sugar or less) 3/4 cup (6 oz)  6-8   Protein Shake 1 shake 110-180 15-30   Skim/1% Milk or lactose-free milk 1 cup ( 8 oz)  8   Plain or light, flavored soymilk 1 cup  7-8   Plain or light, hemp milk 1 cup 110 6   Fat Free or 1% Cottage Cheese 1/2 cup 90 15   Part skim ricotta cheese 1/2 cup 100 14   Part skim or reduced fat cheese slices 1 ounce 65-80 8     Mozzarella String Cheese 1 80 8   Canned tuna, chicken, crab or salmon  (canned in water)  1/2 cup 100 15-20   White fish (broiled, grilled, baked) 3 ounces 100 21   [place]/Tuna (broiled, grilled, baked) 3 ounces 150-180 21   Shrimp, Scallops, Lobster, Crab 3 ounces 100 21   Pork loin, Pork Tenderloin 3 ounces 150 21   Boneless, skinless chicken /turkey breast                          (broiled, grilled, baked) 3 ounces 120 21   [place], [place], [place], and Venison 3 ounces 120 21   Lean cuts of red meat and pork (sirloin,   round, tenderloin, flank, ground  93%-96%) 3 ounces 170 21   Lean or Extra Lean Ground Turkey 1/2 cup 150 20   90-95% Lean [place] Burger 1 van 140-180 21   Low-fat casserole with lean meat 3/4 cup 200 17   Luncheon Meats                                                        (turkey, lean ham, roast beef, chicken) 3 ounces 100 21   Egg (boiled, poached, scrambled) 1 Egg 60 7   Egg Substitute 1/2 cup 70 10   Nuts (limit to 1 serving per day)  3 Tbsp. 150 7   Nut [place] (peanut, almond)  Limit to 1 serving or less daily 1 Tbsp. 90 4   Soy Burger (varies) 1  15   Garbanzo, Black, [place] Beans 1/2 cup 110 7   Refried Beans 1/2 cup 100 7   Kidney and Lima beans 1/2 cup 110 7   Tempeh 3 oz 175 18   Vegan crumbles 1/2 cup 100 14   Tofu 1/2 cup 110 14   Chili (beans and extra lean beef or turkey) 1 cup 200 23   Lentil Stew/Soup 1 cup 150 12   Black Bean Soup 1 cup 175 12         MEDICATIONS FOR WEIGHT LOSS  There are several medications available to assist us in weight loss.  By themselves, without compliance to a change in diet and increase in movement/activity these medications are disappointing in their results. However, combined with a closely monitored program of diet change and exercise they can be very effective in controlling appetite and boosting initial weight loss.  All weight loss medications need continual re-evaluation for efficacy as their side effects and health benefits fail to be worthwhile if a person is not continuing to lose weight or in maintaining their healthy weight.  Some weight loss medications are scheduled drugs, meaning there is at least a theoretical possibility for developing addiction to them but in practice this is rare.  We do anticipate coming off meds in the future after stabilization of weight loss is assurred.  Finally, a tolerance can develop and people s perceived efficacy of medication can diminish.  In communication with your physician, it may be appropriate to intermittently take a break from these  medications and then restart again (few weeks off then restart again) if a plateau is reached that cannot be broken through.  Each person can respond to a medication differently and to be a good option for you, it will need to be affordable, effective and well tolerated with minimal side effects.    In most cases, weight loss progress after one month and three months will be obtained and if a patient is not reaching the satisfactory progress towards weight loss, the medications may be discontinued.  The thought is that if a person is taking a weight loss medication and not receiving the potential health benefit of that drug, the side effects are not worthwhile and use should be discontinued.  On the flip side, there are many people on some weight loss medications for years because it continues to be an effective tool in their weight management and they are tolerating the medication without any long-term side effects.  Each person's response and purpose will be evaluated.      PHENTERMINE (Adipex): approved in 1959 for appetite suppression.  It has stimulant effects and cannot be used with Ritalin, Concerta, or other stimulants.  Although it is not highly addictive, it's chemically related to amphetamines which are addictive.  Occasional dependence can develop, but rarely. The most common side effects are dry mouth, increased energy and concentration, increased pulse, and constipation.  You should not take phentermine if you have glaucoma, hyperthyroidism, or uncontrolled/untreated hypertension or overly anxious. You should stop if dramatic mood swings, severe insomnia, palpations, chest pains, visual changes or if your Blood Pressure is consistently elevated or any time it's over 160/90.   It's ok to go off the med for a few weeks and restart if efficacy is wearing off.  $24-$30 for 90 tablets at [facility]. Females are required to have reliable birth control to reduce the risk birth  defects/miscarriage.      TOPIRAMATE (Topamax): Anti-seizure medication, also used to prevent migraines and sometimes for mood stabilization.  Side effects include paresthesia, glaucoma, altered concentration, attention difficulties, memory and speech problems, metabolic acidosis, depression, increase in body temperature and decrease sweating, risk of kidney stones.  Do not take Topamax while taking Depakote as this can cause high ammonia levels.  You must have reliable birth control as Topamax can cause birth defects.  If prolonged use has occurred it should be tapered off slowly to avoid withdrawal issues.  Insurance usually covers Topiramate.  At higher doses, there may be some confusion/forgetfulness associated with this so we try to limit dose to under 75mg twice daily to reduce this risk. Often covered by insurance as it's used for many reasons.  Topamax will cause carbonated beverages to taste bad. A recheck of your kidney/electrolytes may occur within a few months of starting.    QSYMIA (Phentermine + Topamax):  See above information about phentermine and Topamax.  Most common side effects are paresthesia, dizziness, distortion of taste, insomnia, constipation, and dry mouth.  See above descriptions for the two individual agents.Females are required to have reliable birth control to reduce the risk birth defects/miscarriage.  $150-$220 per month      Liraglutide (Victoza/Saxenda):   Part of the family of Glucagon Like Peptide Agonists, liraglutide directly suppresses appetite and is often used by diabetic patients due to decrease liver production of glucose, thereby improving glucose levels.  It also slows how quickly the stomach empties. May be hard to get covered for non diabetics and is an injectable medication delivered via a injector pen. It can be very costly without insurance coverage (over $500/month).  Small risk for pancreatitis and dose should be held if increased mid abdominal pain/burning. It is  "not to be used if previous Multiple Endocrine Neoplasia. In rodents, may increase risk of thyroid tumors and not indicated for anyone with hx of medullary thyroid cancer as a result.  If changes in voice/swallowing should be discontinued. Reliable birth control required in women.    Contrave (Bupropion/Naltrexone).    Synergistic combination of a mild appetite suppressing anti-depressant (Bupropion) whose effects are increased due to interaction with Naltrexone.  Naltrexone may have some effects on craving and is often used in addiction medicine to help previous opiate addicts be less prone to relapse as it blocks the action of opiates. Should be stopped if any need for opiate pain medication, surgery or planned procedures where you'll be given sedation/anesthesia. If prolonged use recommend stepping down bupropion over 2-3 weeks to limit any risk of withdrawal issues. Side effects may include dry mouth, increased heart rate, mild elevation in Blood pressure;  dizziness, ringing in the ears, anxiety (typically due to bupropion), nausea, constipation, and some get fatigued with naltrexone.  About $210 on [facility] for 120 tabs of \"Contrave\", the brand name without insurance coverage. Generic Bupropion 75mg: $25 for 120 tabs, Naltrexone: $55 for 90 tabs without insurance coverage on [facility]. Cannot be used if pregnant/trying to conceive or breast feeding.        "

## 2021-10-25 NOTE — LETTER
10/25/2021         RE: Donna Bolanos  [address] [place] [address]        Dear Colleague,    Thank you for referring your patient, Donna Bolanos, to the [facility]. Please see a copy of my visit note below.    Bariatric Follow Up Visit with a History of Previous Bariatric Surgery     Date of visit: 10/25/2021  Physician: Alfa Branch MD, MD  Primary Care Provider:  Gela Hummel  Donna Bolanos   36 year old  female    Date of Surgery: 5/7/19  Initial Weight: 270 lbs  Initial BMI: 40.8  Today's Weight:   Wt Readings from Last 1 Encounters:   10/25/21 88.9 kg (196 lb)     Weight history:   Wt Readings from Last 4 Encounters:   10/25/21 88.9 kg (196 lb)   07/19/21 88.9 kg (196 lb)   12/23/20 83.5 kg (184 lb)   05/06/20 90.6 kg (199 lb 12.8 oz)      Body mass index is 29.37 kg/m .      Assessment and Plan     Assessment: Donna is a 36 year old year old female who is 2.5 years s/p  Sleeve Gastrectomy with Dr. Louis.  She's been using her surgery in combination with phentermine therapy this summer and reports weight stability despite some family stressors of late. Her upper back PT at [facility] has gone well and continuing to build up strength.  Given neutrality in weight and some increase family tension/sleep isues and longer term phentermine therapy I advised a drug holiday from phentermine this winter and if weight is increasing, we could consider bupropion/naltrexone or GLP1 agonist therapy.  Donna Bolanos feels as if she has achieved the goals she hoped to accomplish through bariatric surgery and weight loss.  Maintaining 74 lbs of weight reduction since her 2019 intake in the surgery program.  She'll be checking EGD this Fall given some ongoing PPI needs and sleeve gastrectomy procedure in 2019.    Encounter Diagnoses   Name Primary?     History of sleeve gastrectomy Yes     Neck pain      Body mass index (BMI) of 29.0 to 29.9 in adult           Current Outpatient Medications:      B Complex Vitamins (VITAMIN B COMPLEX PO), Take 1 drop by mouth daily., Disp: , Rfl:      calcium citrate-vitamin D (CITRACAL) 315-200 MG-UNIT TABS per tablet, Take 1 tablet by mouth, Disp: , Rfl:      calcium-vitamin D (OSCAL) 250-125 MG-UNIT TABS per tablet, , Disp: , Rfl:      Cholecalciferol (VITAMIN D) 125 MCG (5000 UT) capsule, , Disp: , Rfl:      cyanocobalamin 1000 MCG SUBL, Place 1,000 mcg under the tongue, Disp: , Rfl:      diclofenac (VOLTAREN) 1 % topical gel, Apply topically once or twice daily if needed for neck pain., Disp: , Rfl:      ferrous fumarate (FERRETTS) 324 (106 Fe) MG TABS tablet, , Disp: , Rfl:      gabapentin (NEURONTIN) 300 MG capsule, Take 300 mg by mouth, Disp: , Rfl:      levonorgestrel (MIRENA) 20 MCG/24HR IUD, 1 each by Intrauterine route, Disp: , Rfl:      Multiple Vitamins-Minerals (MULTIVITAL) TABS, Take 1 tablet by mouth daily., Disp: , Rfl:      omeprazole (PRILOSEC) 20 MG DR capsule, Take 1 capsule (20 mg) by mouth daily, Disp: 120 capsule, Rfl: 0     traZODone (DESYREL) 50 MG tablet, TAKE 1-2 TABLETS BY MOUTH AT BEDTIME AS NEEDED FOR SLEEP. INDICATIONS: TROUBLE SLEEPING, Disp: , Rfl:     Plan:  Plan:  1. Great work with maintaining 74 lbs of weight loss over the last 2.5 years. Given some loss of efficacy and some increased family tension/sleep issues, it's reasonable to come off the phentermine and give your body a break. We'll look to keep your weight in the 190s. If weight is ramping up 8-10 lbs let me know and we can look at Contrave therapy or Saxenda/Wegovy injections in the future if needed. Type of med may depend on what your insurance covers well as the injection therapy is not affordable without insurance coverage.    2. Continue good vitamin usage. If having plastic surgery, it's reasonable to check some vitamin labs the month prior to make sure your patch MD product and other supplements are giving you adequate  supplementation. Let me know.    3. Continue neck/upper back exercises, finding that morning/daily routine.      Recheck in 3-6 months. Annual check in May/June.  No follow-ups on file.    Bariatric Surgery Review     Interim History/LifeChanges: pretty stable overall. Did PNBC program, insurance didn't pay for more services and using core program. Started seeing therapist for stress/anxiety/depression and family. Some vaccination conflicts in her family. Music can relax her. Getting her workouts in.     Patient Concerns: pretty stable.  Appetite (1-10): good  .    Reviewed whether any need/indication for screening EGD today and is scheduled on 11/3/21 but may reschedule as she's traveling soon after. She'll let us know.  Typically, a screening EGD is recommend post op year 2-3 if no symptoms to assess health of esophagus/bariatric surgery and sooner if difficult to control GERD or persistent pain/dysphagia sx despite behavior modification.    Medication changes: using patch products now w/ good tolerance (sensistive to iron multivitamins so using non iron patch and takes a few days of extra iron each week).     Vitamin Intake:   B-12   patch MD bcomplex 2x weekly.   MVI  patch multivitamin without iron   Vitamin D  yes daily. 5000IUs   Calcium   once weeky     Other  iron 2 every couple days to supplement              LABS: na  Planning EGD this Fall w/ Dr. Louis.    Most recent labs:  Lab Results   Component Value Date    WBC 5.2 12/15/2020    HGB 14.1 12/15/2020    HCT 41.4 12/15/2020    MCV 96 12/15/2020     12/15/2020     Lab Results   Component Value Date    CHOL 184 05/15/2020     Lab Results   Component Value Date     05/15/2020     No components found for: LDLCALC  Lab Results   Component Value Date    TRIG 73 05/15/2020     No components found for: CHOLHDL  Lab Results   Component Value Date    ALT 14 07/19/2021    AST 20 07/19/2021    ALKPHOS 49 07/19/2021     No results found for:  "HGBA1C  No components found for: VYOMHZZC01  No components found for: [id number]  No components found for: FERRITIN  No components found for: PTH  No components found for: 28175  No components found for: 7597  Lab Results   Component Value Date    TSH 2.59 05/15/2020     No results found for: TESTOSTERONE      Exercise Routine: PNBC back routine, using core program now.  3 meals/day? yes  Protein 60-80g/day? yes  Water Separate from meals? yes  Calorie Containing Beverages: no  Restaurant eating/wk: n/a  Sleep Habits:  Some trazadone needs,  CPAP Use? no  Contraception:IUD  DEXA:n/a.  Discussed annual screening to start at age 45 and continue to age 55 if scoring \"low risk\". DEXA scan recommended at age 55 regardless as long as at least 2 years have transpired from their bariatric surgery.    Social History     Social History     Socioeconomic History     Marital status:      Spouse name: Not on file     Number of children: Not on file     Years of education: Not on file     Highest education level: Not on file   Occupational History     Not on file   Tobacco Use     Smoking status: Former Smoker     Quit date: 2010     Years since quittin.7     Smokeless tobacco: Never Used   Substance and Sexual Activity     Alcohol use: Yes     Comment: 1 time per week 2-4 per time     Drug use: No     Sexual activity: Yes     Partners: Male     Birth control/protection: Condom, I.U.D.   Other Topics Concern     Not on file   Social History Narrative     Not on file     Social Determinants of Health     Financial Resource Strain:      Difficulty of Paying Living Expenses:    Food Insecurity:      Worried About Running Out of Food in the Last Year:      Ran Out of Food in the Last Year:    Transportation Needs:      Lack of Transportation (Medical):      Lack of Transportation (Non-Medical):    Physical Activity:      Days of Exercise per Week:      Minutes of Exercise per Session:    Stress:      Feeling of " "Stress :    Social Connections:      Frequency of Communication with Friends and Family:      Frequency of Social Gatherings with Friends and Family:      Attends Spiritism Services:      Active Member of Clubs or Organizations:      Attends Club or Organization Meetings:      Marital Status:    Intimate Partner Violence:      Fear of Current or Ex-Partner:      Emotionally Abused:      Physically Abused:      Sexually Abused:        Past Medical History     Past Medical History:   Diagnosis Date     Obesity      Problem List     Patient Active Problem List   Diagnosis     Anal fistula     History of sleeve gastrectomy     Postoperative malabsorption     S/P bariatric surgery     Medications     [unfilled]  Surgical History     Past Surgical History  She has a past surgical history that includes Pr Lap, Celso Restrict Proc, Longitudinal Gastrectomy (N/A, 5/7/2019); Pr Surg Diagnostic Exam, Anorectal (N/A, 7/2/2019); Incision And Drainage Perirectal Abscess (N/A, 10/31/2019); and Fissurectomy rectum (N/A, 12/27/2019).    Objective-Exam     Constitutional:  Ht 1.74 m (5' 8.5\")   Wt 88.9 kg (196 lb)   BMI 29.37 kg/m    [unfilled]   General:  Pleasant and in no acute distress, calm demeanor    Neck:  Respiratory: Normal respiratory effort, no cough..  Musculoskeletal: muscle mass WNL  Skin: color normal hair full,   Psychiatric: alert and oriented X3, mood and affect normal    Counseling     We reviewed the important post op bariatric recommendations:  -eating 3 meals daily  -eating protein first, getting >60gm protein daily  -eating slowly, chewing food well  -avoiding/limiting calorie containing beverages  -drinking water 15-30 minutes before or after meals  -limiting restaurant or cafeteria eating to twice a week or less    We discussed the importance of restorative sleep and stress management in maintaining a healthy weight.  We discussed the National Weight Control Registry healthy weight maintenance " strategies and ways to optimize metabolism.  We discussed the importance of physical activity including cardiovascular and strength training in maintaining a healthier weight.    We discussed the importance of life-long vitamin supplementation and life-long  follow-up.    Donna was reminded that, to avoid marginal ulcers she should avoid tobacco at all, alcohol in excess, caffeine in excess, and NSAIDS (unless indicated for cardioprotection or othewise and opposed by a PPI).    Alfa Branch MD    [facility].  10/25/2021  3:09 PM  [phone] (clinic phone)  [phone] (fax)    No images are attached to the encounter.  Medical Decision Making:        15 minutes spent on the date of the encounter doing chart review, history and exam, documentation and further activities per the note      Again, thank you for allowing me to participate in the care of your patient.        Sincerely,        Alfa Branch MD

## 2021-10-25 NOTE — PROGRESS NOTES
Bariatric Follow Up Visit with a History of Previous Bariatric Surgery     Date of visit: 10/25/2021  Physician: Alfa Branch MD, MD  Primary Care Provider:  Gela Hummel  Donna Bolanos   36 year old  female    Date of Surgery: 5/7/19  Initial Weight: 270 lbs  Initial BMI: 40.8  Today's Weight:   Wt Readings from Last 1 Encounters:   10/25/21 88.9 kg (196 lb)     Weight history:   Wt Readings from Last 4 Encounters:   10/25/21 88.9 kg (196 lb)   07/19/21 88.9 kg (196 lb)   12/23/20 83.5 kg (184 lb)   05/06/20 90.6 kg (199 lb 12.8 oz)      Body mass index is 29.37 kg/m .      Assessment and Plan     Assessment: Donna is a 36 year old year old female who is 2.5 years s/p  Sleeve Gastrectomy with Dr. Louis.  She's been using her surgery in combination with phentermine therapy this summer and reports weight stability despite some family stressors of late. Her upper back PT at [facility] has gone well and continuing to build up strength.  Given neutrality in weight and some increase family tension/sleep isues and longer term phentermine therapy I advised a drug holiday from phentermine this winter and if weight is increasing, we could consider bupropion/naltrexone or GLP1 agonist therapy.  Donna Bolanos feels as if she has achieved the goals she hoped to accomplish through bariatric surgery and weight loss.  Maintaining 74 lbs of weight reduction since her 2019 intake in the surgery program.  She'll be checking EGD this Fall given some ongoing PPI needs and sleeve gastrectomy procedure in 2019.    Encounter Diagnoses   Name Primary?     History of sleeve gastrectomy Yes     Neck pain      Body mass index (BMI) of 29.0 to 29.9 in adult          Current Outpatient Medications:      B Complex Vitamins (VITAMIN B COMPLEX PO), Take 1 drop by mouth daily., Disp: , Rfl:      calcium citrate-vitamin D (CITRACAL) 315-200 MG-UNIT TABS per tablet, Take 1 tablet by mouth, Disp: , Rfl:      calcium-vitamin D (OSCAL)  250-125 MG-UNIT TABS per tablet, , Disp: , Rfl:      Cholecalciferol (VITAMIN D) 125 MCG (5000 UT) capsule, , Disp: , Rfl:      cyanocobalamin 1000 MCG SUBL, Place 1,000 mcg under the tongue, Disp: , Rfl:      diclofenac (VOLTAREN) 1 % topical gel, Apply topically once or twice daily if needed for neck pain., Disp: , Rfl:      ferrous fumarate (FERRETTS) 324 (106 Fe) MG TABS tablet, , Disp: , Rfl:      gabapentin (NEURONTIN) 300 MG capsule, Take 300 mg by mouth, Disp: , Rfl:      levonorgestrel (MIRENA) 20 MCG/24HR IUD, 1 each by Intrauterine route, Disp: , Rfl:      Multiple Vitamins-Minerals (MULTIVITAL) TABS, Take 1 tablet by mouth daily., Disp: , Rfl:      omeprazole (PRILOSEC) 20 MG DR capsule, Take 1 capsule (20 mg) by mouth daily, Disp: 120 capsule, Rfl: 0     traZODone (DESYREL) 50 MG tablet, TAKE 1-2 TABLETS BY MOUTH AT BEDTIME AS NEEDED FOR SLEEP. INDICATIONS: TROUBLE SLEEPING, Disp: , Rfl:     Plan:  Plan:  1. Great work with maintaining 74 lbs of weight loss over the last 2.5 years. Given some loss of efficacy and some increased family tension/sleep issues, it's reasonable to come off the phentermine and give your body a break. We'll look to keep your weight in the 190s. If weight is ramping up 8-10 lbs let me know and we can look at Contrave therapy or Saxenda/Wegovy injections in the future if needed. Type of med may depend on what your insurance covers well as the injection therapy is not affordable without insurance coverage.    2. Continue good vitamin usage. If having plastic surgery, it's reasonable to check some vitamin labs the month prior to make sure your patch MD product and other supplements are giving you adequate supplementation. Let me know.    3. Continue neck/upper back exercises, finding that morning/daily routine.      Recheck in 3-6 months. Annual check in May/June.  No follow-ups on file.    Bariatric Surgery Review     Interim History/LifeChanges: pretty stable overall. Did PNBC  program, insurance didn't pay for more services and using core program. Started seeing therapist for stress/anxiety/depression and family. Some vaccination conflicts in her family. Music can relax her. Getting her workouts in.     Patient Concerns: pretty stable.  Appetite (1-10): good  .    Reviewed whether any need/indication for screening EGD today and is scheduled on 11/3/21 but may reschedule as she's traveling soon after. She'll let us know.  Typically, a screening EGD is recommend post op year 2-3 if no symptoms to assess health of esophagus/bariatric surgery and sooner if difficult to control GERD or persistent pain/dysphagia sx despite behavior modification.    Medication changes: using patch products now w/ good tolerance (sensistive to iron multivitamins so using non iron patch and takes a few days of extra iron each week).     Vitamin Intake:   B-12   patch MD bcomplex 2x weekly.   MVI  patch multivitamin without iron   Vitamin D  yes daily. 5000IUs   Calcium   once weeky     Other  iron 2 every couple days to supplement              LABS: na  Planning EGD this Fall w/ Dr. Louis.    Most recent labs:  Lab Results   Component Value Date    WBC 5.2 12/15/2020    HGB 14.1 12/15/2020    HCT 41.4 12/15/2020    MCV 96 12/15/2020     12/15/2020     Lab Results   Component Value Date    CHOL 184 05/15/2020     Lab Results   Component Value Date     05/15/2020     No components found for: LDLCALC  Lab Results   Component Value Date    TRIG 73 05/15/2020     No components found for: CHOLHDL  Lab Results   Component Value Date    ALT 14 07/19/2021    AST 20 07/19/2021    ALKPHOS 49 07/19/2021     No results found for: HGBA1C  No components found for: [id number]  No components found for: [id number]  No components found for: FERRITIN  No components found for: PTH  No components found for: 16581  No components found for: 7597  Lab Results   Component Value Date    TSH 2.59 05/15/2020     No results  "found for: TESTOSTERONE      Exercise Routine: PNBC back routine, using core program now.  3 meals/day? yes  Protein 60-80g/day? yes  Water Separate from meals? yes  Calorie Containing Beverages: no  Restaurant eating/wk: n/a  Sleep Habits:  Some trazadone needs,  CPAP Use? no  Contraception:IUD  DEXA:n/a.  Discussed annual screening to start at age 45 and continue to age 55 if scoring \"low risk\". DEXA scan recommended at age 55 regardless as long as at least 2 years have transpired from their bariatric surgery.    Social History     Social History     Socioeconomic History     Marital status:      Spouse name: Not on file     Number of children: Not on file     Years of education: Not on file     Highest education level: Not on file   Occupational History     Not on file   Tobacco Use     Smoking status: Former Smoker     Quit date: 2010     Years since quittin.7     Smokeless tobacco: Never Used   Substance and Sexual Activity     Alcohol use: Yes     Comment: 1 time per week 2-4 per time     Drug use: No     Sexual activity: Yes     Partners: Male     Birth control/protection: Condom, I.U.D.   Other Topics Concern     Not on file   Social History Narrative     Not on file     Social Determinants of Health     Financial Resource Strain:      Difficulty of Paying Living Expenses:    Food Insecurity:      Worried About Running Out of Food in the Last Year:      Ran Out of Food in the Last Year:    Transportation Needs:      Lack of Transportation (Medical):      Lack of Transportation (Non-Medical):    Physical Activity:      Days of Exercise per Week:      Minutes of Exercise per Session:    Stress:      Feeling of Stress :    Social Connections:      Frequency of Communication with Friends and Family:      Frequency of Social Gatherings with Friends and Family:      Attends Worship Services:      Active Member of Clubs or Organizations:      Attends Club or Organization Meetings:      Marital " "Status:    Intimate Partner Violence:      Fear of Current or Ex-Partner:      Emotionally Abused:      Physically Abused:      Sexually Abused:        Past Medical History     Past Medical History:   Diagnosis Date     Obesity      Problem List     Patient Active Problem List   Diagnosis     Anal fistula     History of sleeve gastrectomy     Postoperative malabsorption     S/P bariatric surgery     Medications     [unfilled]  Surgical History     Past Surgical History  She has a past surgical history that includes Pr Lap, Celso Restrict Proc, Longitudinal Gastrectomy (N/A, 5/7/2019); Pr Surg Diagnostic Exam, Anorectal (N/A, 7/2/2019); Incision And Drainage Perirectal Abscess (N/A, 10/31/2019); and Fissurectomy rectum (N/A, 12/27/2019).    Objective-Exam     Constitutional:  Ht 1.74 m (5' 8.5\")   Wt 88.9 kg (196 lb)   BMI 29.37 kg/m    [unfilled]   General:  Pleasant and in no acute distress, calm demeanor    Neck:  Respiratory: Normal respiratory effort, no cough..  Musculoskeletal: muscle mass WNL  Skin: color normal hair full,   Psychiatric: alert and oriented X3, mood and affect normal    Counseling     We reviewed the important post op bariatric recommendations:  -eating 3 meals daily  -eating protein first, getting >60gm protein daily  -eating slowly, chewing food well  -avoiding/limiting calorie containing beverages  -drinking water 15-30 minutes before or after meals  -limiting restaurant or cafeteria eating to twice a week or less    We discussed the importance of restorative sleep and stress management in maintaining a healthy weight.  We discussed the National Weight Control Registry healthy weight maintenance strategies and ways to optimize metabolism.  We discussed the importance of physical activity including cardiovascular and strength training in maintaining a healthier weight.    We discussed the importance of life-long vitamin supplementation and life-long  follow-up.    Donna was reminded " that, to avoid marginal ulcers she should avoid tobacco at all, alcohol in excess, caffeine in excess, and NSAIDS (unless indicated for cardioprotection or othewise and opposed by a PPI).    Alfa Branch MD    [facility].  10/25/2021  3:09 PM  [phone] (clinic phone)  [phone] (fax)    No images are attached to the encounter.  Medical Decision Making:        15 minutes spent on the date of the encounter doing chart review, history and exam, documentation and further activities per the note

## 2021-10-26 DIAGNOSIS — K21.9 GASTROESOPHAGEAL REFLUX DISEASE, UNSPECIFIED WHETHER ESOPHAGITIS PRESENT: ICD-10-CM

## 2021-10-26 DIAGNOSIS — Z90.3 HISTORY OF SLEEVE GASTRECTOMY: ICD-10-CM

## 2021-10-28 ENCOUNTER — TELEPHONE (OUTPATIENT)
Dept: SURGERY | Facility: CLINIC | Age: 36
End: 2021-10-28
Payer: COMMERCIAL

## 2021-10-28 NOTE — TELEPHONE ENCOUNTER
Donna called in to reschedule her EGD with Dr. Louis from 11/3 to 12/29 because she will be out of town in early November.    Covid test rescheduled to 12/26 at 10:30 am

## 2021-10-28 NOTE — LETTER
We've received instruction to get you scheduled for and EGD with Dr Louis. We have that arranged as follows:     Surgery Date: 12/29/2021  Location: [address], [address] (3rd floor) [facility]  Arrival Time: 12:00 pm (Unless instructed differently by the pre-op call nurse)     Prep Instructions:     1. PCR-Rated COVID19 testing is required within 4 days of surgery. We have [facility], [address] [place] [address] on 12/26/2021 at 10:30 am. Follow the specific instructions you receive by Corey. If your test is positive, your surgery will be canceled.     2. Nothing to eat or drink for 8 hours before surgery unless instructed differently by the pre-op nurse.    3. If the community sees a new COVID19 surge, your procedure may need to be postponed. We will contact you if this happens.     4. You will need an adult to drive you home and stay with you 24 hours after surgery.     5. You may have one family member wait in the lobby at the surgery center during your surgery. Visitor restrictions are subject to change, please verify with the pre-op nurse when they call.    6. When you arrive to the surgery center, you will be screened for COVID19 symptoms. If you screen positive, your surgery will need to be postponed.    7. We always encourage you to notify your insurance any time you have medical tests or procedures scheduled including surgery. The number is usually right on the back of your insurance card. Please call [facility] Cost of Care at [phone] for the surgeon fees, and [facility] Cost of Care [phone] for facility fees if you need pricing information.     8. You will receive a call from a pre-op call nurse 1-3 days prior to surgery. She will go over more details with you.     Call our office if you have any questions! Thank you!

## 2021-11-03 DIAGNOSIS — K91.2 POSTOPERATIVE MALABSORPTION: Primary | ICD-10-CM

## 2021-11-15 ENCOUNTER — MYC MEDICAL ADVICE (OUTPATIENT)
Dept: SURGERY | Facility: CLINIC | Age: 36
End: 2021-11-15
Payer: COMMERCIAL

## 2021-11-15 NOTE — TELEPHONE ENCOUNTER
Donna called in and left a message that she needs to reschedule her EGD from Late Dec to Late Jan.

## 2021-11-15 NOTE — TELEPHONE ENCOUNTER
Called Donna back to reschedule her EGD, and received her voicemail. Informed her that I will r/s her procedure from 12/29 to 1/26 per her request. I will sent the new information out to her via [employer] and she is welcome to contact me with any questions.

## 2021-12-06 DIAGNOSIS — Z86.39 HISTORY OF MORBID OBESITY: ICD-10-CM

## 2021-12-06 DIAGNOSIS — E66.811 OBESITY, CLASS I, BMI 30-34.9: Primary | ICD-10-CM

## 2021-12-06 RX ORDER — LIRAGLUTIDE 6 MG/ML
INJECTION, SOLUTION SUBCUTANEOUS
Qty: 3 ML | Refills: 5 | Status: SHIPPED | OUTPATIENT
Start: 2021-12-06 | End: 2021-12-09

## 2021-12-07 ENCOUNTER — TELEPHONE (OUTPATIENT)
Dept: SURGERY | Facility: CLINIC | Age: 36
End: 2021-12-07
Payer: COMMERCIAL

## 2021-12-07 NOTE — TELEPHONE ENCOUNTER
Prior Authorization Retail Medication Request    Medication/Dose: Saxenda  ICD code (if different than what is on RX):    Previously Tried and Failed:    Rationale:      Insurance Name:  [employer]  Insurance ID:  [id number]    Key: [id number]    Pharmacy Information (if different than what is on RX)  [facility]  Phone:  [phone]

## 2021-12-07 NOTE — TELEPHONE ENCOUNTER
Central Prior Authorization Team   Phone: [phone]    PA Initiation    Medication: Saxenda  Insurance Company: [employer] ([facility]) - Phone [phone] Fax [phone]  Pharmacy Filling the Rx: [facility] - [place], [place] - [address]  Filling Pharmacy Phone: [phone]  Filling Pharmacy Fax:    Start Date: 12/7/2021

## 2021-12-08 NOTE — TELEPHONE ENCOUNTER
Prior Authorization Approval    Authorization Effective Date: 12/7/2021  Authorization Expiration Date: 4/7/2022  Medication: Saxenda  Approved Dose/Quantity:    Reference #:     Insurance Company: [employer] ([facility]) - Phone [phone] Fax [phone]  Expected CoPay:       CoPay Card Available:      Foundation Assistance Needed:    Which Pharmacy is filling the prescription (Not needed for infusion/clinic administered): [facility] - [place], [place] - [address] [place] [address] [address]  Pharmacy Notified: Yes  Patient Notified: Yes

## 2021-12-09 DIAGNOSIS — Z86.39 HISTORY OF MORBID OBESITY: ICD-10-CM

## 2021-12-09 DIAGNOSIS — E66.811 OBESITY, CLASS I, BMI 30-34.9: ICD-10-CM

## 2021-12-09 RX ORDER — LIRAGLUTIDE 6 MG/ML
INJECTION, SOLUTION SUBCUTANEOUS
Qty: 15 ML | Refills: 3 | Status: SHIPPED | OUTPATIENT
Start: 2021-12-09 | End: 2022-04-06

## 2022-01-18 DIAGNOSIS — Z90.3 HISTORY OF SLEEVE GASTRECTOMY: ICD-10-CM

## 2022-01-18 DIAGNOSIS — K21.9 GASTROESOPHAGEAL REFLUX DISEASE, UNSPECIFIED WHETHER ESOPHAGITIS PRESENT: ICD-10-CM

## 2022-01-22 ENCOUNTER — LAB (OUTPATIENT)
Dept: FAMILY MEDICINE | Facility: CLINIC | Age: 37
End: 2022-01-22
Payer: COMMERCIAL

## 2022-01-22 DIAGNOSIS — Z11.59 ENCOUNTER FOR SCREENING FOR OTHER VIRAL DISEASES: ICD-10-CM

## 2022-01-22 PROCEDURE — U0005 INFEC AGEN DETEC AMPLI PROBE: HCPCS

## 2022-01-22 PROCEDURE — U0003 INFECTIOUS AGENT DETECTION BY NUCLEIC ACID (DNA OR RNA); SEVERE ACUTE RESPIRATORY SYNDROME CORONAVIRUS 2 (SARS-COV-2) (CORONAVIRUS DISEASE [COVID-19]), AMPLIFIED PROBE TECHNIQUE, MAKING USE OF HIGH THROUGHPUT TECHNOLOGIES AS DESCRIBED BY CMS-2020-01-R: HCPCS

## 2022-01-23 LAB — SARS-COV-2 RNA RESP QL NAA+PROBE: NEGATIVE

## 2022-01-24 RX ORDER — PROPRANOLOL HYDROCHLORIDE 10 MG/1
TABLET ORAL
COMMUNITY
Start: 2022-01-06

## 2022-01-24 RX ORDER — HYDROXYZINE PAMOATE 25 MG/1
CAPSULE ORAL
COMMUNITY
Start: 2022-01-06

## 2022-01-24 ASSESSMENT — MIFFLIN-ST. JEOR: SCORE: 1671.78

## 2022-01-25 ENCOUNTER — ANESTHESIA EVENT (OUTPATIENT)
Dept: SURGERY | Facility: AMBULATORY SURGERY CENTER | Age: 37
End: 2022-01-25
Payer: COMMERCIAL

## 2022-01-26 ENCOUNTER — HOSPITAL ENCOUNTER (OUTPATIENT)
Facility: AMBULATORY SURGERY CENTER | Age: 37
End: 2022-01-26
Attending: SURGERY
Payer: COMMERCIAL

## 2022-01-26 ENCOUNTER — ANESTHESIA (OUTPATIENT)
Dept: SURGERY | Facility: AMBULATORY SURGERY CENTER | Age: 37
End: 2022-01-26
Payer: COMMERCIAL

## 2022-01-26 VITALS
HEART RATE: 77 BPM | DIASTOLIC BLOOD PRESSURE: 57 MMHG | WEIGHT: 204 LBS | OXYGEN SATURATION: 100 % | BODY MASS INDEX: 30.21 KG/M2 | HEIGHT: 69 IN | RESPIRATION RATE: 16 BRPM | SYSTOLIC BLOOD PRESSURE: 109 MMHG | TEMPERATURE: 98 F

## 2022-01-26 DIAGNOSIS — Z98.84 S/P BARIATRIC SURGERY: ICD-10-CM

## 2022-01-26 PROCEDURE — 43235 EGD DIAGNOSTIC BRUSH WASH: CPT | Performed by: SURGERY

## 2022-01-26 RX ORDER — ONDANSETRON 4 MG/1
4 TABLET, ORALLY DISINTEGRATING ORAL EVERY 30 MIN PRN
Status: DISCONTINUED | OUTPATIENT
Start: 2022-01-26 | End: 2022-01-27 | Stop reason: HOSPADM

## 2022-01-26 RX ORDER — SODIUM CHLORIDE, SODIUM LACTATE, POTASSIUM CHLORIDE, CALCIUM CHLORIDE 600; 310; 30; 20 MG/100ML; MG/100ML; MG/100ML; MG/100ML
INJECTION, SOLUTION INTRAVENOUS CONTINUOUS
Status: DISCONTINUED | OUTPATIENT
Start: 2022-01-26 | End: 2022-01-27 | Stop reason: HOSPADM

## 2022-01-26 RX ORDER — LIDOCAINE 40 MG/G
CREAM TOPICAL
Status: DISCONTINUED | OUTPATIENT
Start: 2022-01-26 | End: 2022-01-27 | Stop reason: HOSPADM

## 2022-01-26 RX ORDER — OXYCODONE HYDROCHLORIDE 5 MG/1
5 TABLET ORAL EVERY 4 HOURS PRN
Status: DISCONTINUED | OUTPATIENT
Start: 2022-01-26 | End: 2022-01-27 | Stop reason: HOSPADM

## 2022-01-26 RX ORDER — LIDOCAINE HYDROCHLORIDE 20 MG/ML
INJECTION, SOLUTION INFILTRATION; PERINEURAL PRN
Status: DISCONTINUED | OUTPATIENT
Start: 2022-01-26 | End: 2022-01-26

## 2022-01-26 RX ORDER — PROPOFOL 10 MG/ML
INJECTION, EMULSION INTRAVENOUS CONTINUOUS PRN
Status: DISCONTINUED | OUTPATIENT
Start: 2022-01-26 | End: 2022-01-26

## 2022-01-26 RX ORDER — FENTANYL CITRATE 50 UG/ML
25 INJECTION, SOLUTION INTRAMUSCULAR; INTRAVENOUS EVERY 5 MIN PRN
Status: DISCONTINUED | OUTPATIENT
Start: 2022-01-26 | End: 2022-01-27 | Stop reason: HOSPADM

## 2022-01-26 RX ORDER — ONDANSETRON 2 MG/ML
4 INJECTION INTRAMUSCULAR; INTRAVENOUS EVERY 30 MIN PRN
Status: DISCONTINUED | OUTPATIENT
Start: 2022-01-26 | End: 2022-01-27 | Stop reason: HOSPADM

## 2022-01-26 RX ORDER — GLYCOPYRROLATE 0.2 MG/ML
INJECTION, SOLUTION INTRAMUSCULAR; INTRAVENOUS PRN
Status: DISCONTINUED | OUTPATIENT
Start: 2022-01-26 | End: 2022-01-26

## 2022-01-26 RX ORDER — MEPERIDINE HYDROCHLORIDE 25 MG/ML
12.5 INJECTION INTRAMUSCULAR; INTRAVENOUS; SUBCUTANEOUS
Status: DISCONTINUED | OUTPATIENT
Start: 2022-01-26 | End: 2022-01-27 | Stop reason: HOSPADM

## 2022-01-26 RX ORDER — HYDROMORPHONE HCL IN WATER/PF 6 MG/30 ML
0.2 PATIENT CONTROLLED ANALGESIA SYRINGE INTRAVENOUS EVERY 5 MIN PRN
Status: DISCONTINUED | OUTPATIENT
Start: 2022-01-26 | End: 2022-01-27 | Stop reason: HOSPADM

## 2022-01-26 RX ORDER — FENTANYL CITRATE 50 UG/ML
25 INJECTION, SOLUTION INTRAMUSCULAR; INTRAVENOUS
Status: DISCONTINUED | OUTPATIENT
Start: 2022-01-26 | End: 2022-01-27 | Stop reason: HOSPADM

## 2022-01-26 RX ADMIN — GLYCOPYRROLATE 0.2 MG: 0.2 INJECTION, SOLUTION INTRAMUSCULAR; INTRAVENOUS at 14:23

## 2022-01-26 RX ADMIN — PROPOFOL 200 MCG/KG/MIN: 10 INJECTION, EMULSION INTRAVENOUS at 14:23

## 2022-01-26 RX ADMIN — SODIUM CHLORIDE, SODIUM LACTATE, POTASSIUM CHLORIDE, CALCIUM CHLORIDE: 600; 310; 30; 20 INJECTION, SOLUTION INTRAVENOUS at 14:19

## 2022-01-26 RX ADMIN — LIDOCAINE HYDROCHLORIDE 3 ML: 20 INJECTION, SOLUTION INFILTRATION; PERINEURAL at 14:23

## 2022-01-26 ASSESSMENT — LIFESTYLE VARIABLES: TOBACCO_USE: 1

## 2022-01-26 NOTE — ANESTHESIA POSTPROCEDURE EVALUATION
Patient: Donna Bolanos    Procedure: Procedure(s):  ESOPHAGOGASTRODUODENOSCOPY       Diagnosis:S/P bariatric surgery [Z98.84]  Diagnosis Additional Information: No value filed.    Anesthesia Type:  MAC    Note:  Disposition: Outpatient   Postop Pain Control: Uneventful            Sign Out: Well controlled pain   PONV: No   Neuro/Psych: Uneventful            Sign Out: Acceptable/Baseline neuro status   Airway/Respiratory: Uneventful            Sign Out: Acceptable/Baseline resp. status   CV/Hemodynamics: Uneventful            Sign Out: Acceptable CV status; No obvious hypovolemia; No obvious fluid overload   Other NRE: NONE   DID A NON-ROUTINE EVENT OCCUR? No           Last vitals:  Vitals Value Taken Time   /70 01/26/22 1500   Temp 98  F (36.7  C) 01/26/22 1440   Pulse 83 01/26/22 1501   Resp 18 01/26/22 1445   SpO2 92 % 01/26/22 1501   Vitals shown include unvalidated device data.    Electronically Signed By: Mark Coronado MD  January 26, 2022  3:03 PM

## 2022-01-26 NOTE — H&P
"PRE PROCEDURE NOTE - H & P      Chief Complaint/Reason for Procedure:              GERD      History and Physical Reviewed:   Reviewed no changes               Pre-sedation assessment:            Ht 1.74 m (5' 8.5\")   Wt 92.5 kg (204 lb)   BMI 30.57 kg/m    General appearance: alert, appears stated age and cooperative  Lungs: clear to auscultation bilaterally  Heart: regular rate and rhythm      Previous reaction to anesthesia/sedation:  NOne      Sedation Plan based on assessment: Moderate      Comments: ok for moderate sedation      ASA Classification: 2      Impression:  Patient deemed adequate candidate for conscious sedation         Plan:   esophagogastroduodenoscopy      Austyn Louis MD  1/26/2022 2:10 PM  [facility]  [phone]                                "

## 2022-01-26 NOTE — ANESTHESIA CARE TRANSFER NOTE
Patient: Donna Bolanos    Procedure: Procedure(s):  ESOPHAGOGASTRODUODENOSCOPY       Diagnosis: S/P bariatric surgery [Z98.84]  Diagnosis Additional Information: No value filed.    Anesthesia Type:   MAC     Note:    Oropharynx: oropharynx clear of all foreign objects  Level of Consciousness: awake  Oxygen Supplementation: room air    Independent Airway: airway patency satisfactory and stable  Dentition: dentition unchanged  Vital Signs Stable: post-procedure vital signs reviewed and stable  Report to RN Given: handoff report given  Patient transferred to: Phase II    Handoff Report: Identifed the Patient, Identified the Reponsible Provider, Reviewed the pertinent medical history, Discussed the surgical course, Reviewed Intra-OP anesthesia mangement and issues during anesthesia, Set expectations for post-procedure period and Allowed opportunity for questions and acknowledgement of understanding      Vitals:  Vitals Value Taken Time   /60 01/26/22 1438   Temp 98  F (36.7  C) 01/26/22 1438   Pulse 81 01/26/22 1438   Resp 18 01/26/22 1438   SpO2 100 % 01/26/22 1438   Vitals shown include unvalidated device data.    Electronically Signed By: BG Osborne CRNA  January 26, 2022  2:56 PM

## 2022-01-26 NOTE — OP NOTE
POST ENDOSCOPY NOTE      1/26/2022 2:38 PM      Pre-op Dx:              S/P bariatric surgery [Z98.84]      Procedure Performed:   Upper endoscopy                Indications:            GERD, status post sleeve gastrectomy      Findings:                3 to 4 cm hiatal hernia, no esophagitis      Operative report: The patient was brought to the operating room where after initiation of monitored anesthetic she was positioned in left lateral decubitus.  After a procedural timeout we began by advancing the endoscope down her esophagus through her sleeve gastrectomy and into the second portion of her duodenum.  On withdrawal, the duodenum was normal in appearance without any pathology present.  Drying back into the stomach, the prepyloric region and antrum were normal in appearance.  The incisura was not causing any obstruction and was easily traversed with the scope.  The body of the sleeve gastrectomy appeared to be an appropriate size.  No gastritis, polyps or ulcers were noted in the sleeve gastrectomy.  The diaphragmatic pinch was present at roughly 40 cm from the incisors, with a hiatal hernia present demonstrating the Z-line which appeared irregular at roughly 37 to 36 cm from the incisors.  The remaining esophagus was normal in appearance, without evidence of ulceration or esophagitis.  No visible evidence for Reeves's esophagus.      EBL:                        Minimal      Medications:          Monitored anesthesia care  MAC;   please see the anesthesia note for details      Complications:      None      Post-op Dx:     GERD with hiatal hernia             Recommendation: Continue with daily PPI therapy      Austyn Louis MD, FACS  Office: [phone]  [facility]   General and Bariatric Surgery

## 2022-01-26 NOTE — ANESTHESIA PREPROCEDURE EVALUATION
Anesthesia Pre-Procedure Evaluation    Patient: Donna Bolanos   MRN: [id number] : 1985        Preoperative Diagnosis: S/P bariatric surgery [Z98.84]    Procedure : Procedure(s):  ESOPHAGOGASTRODUODENOSCOPY          Past Medical History:   Diagnosis Date     Arthritis     Neck     Gastroesophageal reflux disease      Obesity       Past Surgical History:   Procedure Laterality Date     FISSURECTOMY RECTUM N/A 2019    Procedure: EXAM UNDER ANESTHESIA, FISTULOTOMY;  Surgeon: Kiki Everett MD;  Location: [facility];  Service: General     INCISION AND DRAINAGE PERIRECTAL ABSCESS N/A 10/31/2019    Procedure: EXAMINE UNDER ANESTHESIA, INCISION AND DRAINAGE PERIRECTAL ABSCESS;  Surgeon: Kiki Everett MD;  Location: [facility];  Service: General     KS LAP, PANCHO RESTRICT PROC, LONGITUDINAL GASTRECTOMY N/A 2019    Procedure: GASTRECTOMY, SLEEVE, LAPAROSCOPIC;  Surgeon: Austyn Louis MD;  Location: [facility];  Service: General     KS SURG DIAGNOSTIC EXAM, ANORECTAL N/A 2019    Procedure: RECTAL EXAM UNDER ANESTHESIA, Seton placement, anal fistula debridment,;  Surgeon: Amado Shepherd MD;  Location: [facility];  Service: General      No Known Allergies   Social History     Tobacco Use     Smoking status: Former Smoker     Quit date: 2010     Years since quittin.9     Smokeless tobacco: Never Used   Substance Use Topics     Alcohol use: Yes     Comment: 1 time per week 2-4 per time      Wt Readings from Last 1 Encounters:   22 92.5 kg (204 lb)        Anesthesia Evaluation   Pt has had prior anesthetic. Type: General.    No history of anesthetic complications       ROS/MED HX  ENT/Pulmonary:  - neg pulmonary ROS   (+) tobacco use (occasional), Current use,     Neurologic:  - neg neurologic ROS     Cardiovascular:  - neg cardiovascular ROS     METS/Exercise Tolerance:     Hematologic:  - neg hematologic  ROS     Musculoskeletal:  - neg  musculoskeletal ROS     GI/Hepatic: Comment: S/p gastric bypass - neg GI/hepatic ROS   (+) GERD, Asymptomatic on medication,     Renal/Genitourinary:  - neg Renal ROS     Endo:  - neg endo ROS   (+) Obesity (bmi of 30 after gastric bypass),     Psychiatric/Substance Use:  - neg psychiatric ROS     Infectious Disease:  - neg infectious disease ROS     Malignancy:  - neg malignancy ROS     Other:  - neg other ROS          Physical Exam    Airway        Mallampati: II   TM distance: > 3 FB   Neck ROM: full   Mouth opening: > 3 cm    Respiratory Devices and Support         Dental  no notable dental history     (+) implants      Cardiovascular   cardiovascular exam normal          Pulmonary   pulmonary exam normal                OUTSIDE LABS:  CBC:   Lab Results   Component Value Date    WBC 5.2 12/15/2020    WBC 6.3 05/15/2020    HGB 14.1 12/15/2020    HGB 15.5 05/15/2020    HCT 41.4 12/15/2020    HCT 45.7 05/15/2020     12/15/2020     05/15/2020     BMP:   Lab Results   Component Value Date     07/19/2021     12/15/2020    POTASSIUM 4.1 07/19/2021    POTASSIUM 4.4 12/15/2020    CHLORIDE 104 07/19/2021    CHLORIDE 105 12/15/2020    CO2 24 07/19/2021    CO2 24 12/15/2020    BUN 9 07/19/2021    BUN 13 12/15/2020    CR 0.72 07/19/2021    CR 0.74 12/15/2020    GLC 78 07/19/2021    GLC 70 12/15/2020     COAGS: No results found for: PTT, INR, FIBR  POC:   Lab Results   Component Value Date    HCG Negative 10/31/2019     HEPATIC:   Lab Results   Component Value Date    ALBUMIN 4.0 07/19/2021    PROTTOTAL 6.5 07/19/2021    ALT 14 07/19/2021    AST 20 07/19/2021    ALKPHOS 49 07/19/2021    BILITOTAL 0.7 07/19/2021     OTHER:   Lab Results   Component Value Date    A1C 4.8 07/19/2021    LITO 9.0 07/19/2021    MAG 1.9 05/15/2020    TSH 2.59 05/15/2020       Anesthesia Plan    ASA Status:  2   NPO Status:  NPO Appropriate    Anesthesia Type: MAC.     - Reason for MAC: immobility needed, straight local not  clinically adequate   Induction: Propofol.   Maintenance: TIVA.        Consents    Anesthesia Plan(s) and associated risks, benefits, and realistic alternatives discussed. Questions answered and patient/representative(s) expressed understanding.    - Discussed:     - Discussed with:  Patient         Postoperative Care    Pain management: Multi-modal analgesia.   PONV prophylaxis: Ondansetron (or other 5HT-3), Dexamethasone or Solumedrol     Comments:    Other Comments:     Reviewed anesthetic options and risks. Patient agrees to proceed.       Recent Results (from the past 120 hour(s))  -Asymptomatic COVID-19 Virus (Coronavirus) by PCR Nose:   Collection Time: 01/22/22  9:15 AM  Specimen: Nose; Swab       Result                                            Value                         Ref Range                       SARS CoV2 PCR                                     Negative                      Negative, Testing sent t*            Mark Coronado MD

## 2022-02-02 ENCOUNTER — VIRTUAL VISIT (OUTPATIENT)
Dept: SURGERY | Facility: CLINIC | Age: 37
End: 2022-02-02
Payer: COMMERCIAL

## 2022-02-02 VITALS — HEIGHT: 69 IN | WEIGHT: 196 LBS | BODY MASS INDEX: 29.03 KG/M2

## 2022-02-02 DIAGNOSIS — K91.2 POSTOPERATIVE MALABSORPTION: ICD-10-CM

## 2022-02-02 DIAGNOSIS — Z90.3 HISTORY OF SLEEVE GASTRECTOMY: ICD-10-CM

## 2022-02-02 DIAGNOSIS — R79.0 LOW FERRITIN: Primary | ICD-10-CM

## 2022-02-02 DIAGNOSIS — Z86.39 HISTORY OF MORBID OBESITY: ICD-10-CM

## 2022-02-02 PROCEDURE — 99213 OFFICE O/P EST LOW 20 MIN: CPT | Mod: GT | Performed by: EMERGENCY MEDICINE

## 2022-02-02 RX ORDER — HYDROXYZINE HYDROCHLORIDE 10 MG/1
TABLET, FILM COATED ORAL
COMMUNITY
Start: 2022-01-27

## 2022-02-02 RX ORDER — FLUOXETINE 40 MG/1
CAPSULE ORAL
COMMUNITY
Start: 2022-01-27

## 2022-02-02 RX ORDER — BUSPIRONE HYDROCHLORIDE 10 MG/1
TABLET ORAL
COMMUNITY
Start: 2022-01-27

## 2022-02-02 ASSESSMENT — MIFFLIN-ST. JEOR: SCORE: 1635.49

## 2022-02-02 NOTE — PROGRESS NOTES
Donna Bolanos is 36 year old  female who presents for a billable video visit today.    How would you like to obtain your AVS? MyChart  If dropped from the video visit, the video invitation should be resent by: Text to cell phone: [phone]  Will anyone else be joining your video visit? No      Video Start Time: 3:13 PM    Provider Notes:   Bariatric Follow Up Visit with a History of Previous Bariatric Surgery     Date of visit: 2/2/2022  Physician: Alfa Branch MD, MD  Primary Care Provider:  Gela Hummel  Donna Bolanos   36 year old  female    Date of Surgery: 5/7/19  Initial Weight: 270 lbs  Initial BMI: 40.8  Today's Weight:   Wt Readings from Last 1 Encounters:   02/02/22 88.9 kg (196 lb)     Weight history:   Wt Readings from Last 4 Encounters:   02/02/22 88.9 kg (196 lb)   01/24/22 92.5 kg (204 lb)   10/25/21 88.9 kg (196 lb)   07/19/21 88.9 kg (196 lb)      Body mass index is 29.37 kg/m .      Assessment and Plan     Assessment: Donna is a 36 year old year old female who is 2.6 years s/p  Sleeve Gastrectomy with Dr. Louis. She'd had some weight regain and started Saxenda therapy this winter. Noticing decreased appetite but a bit nauseated at highest dose so we'll titrate down to best tolerated level. Iron supplementation going better now w/ non iron patch and 3 days weekly ferritt's fumarate 106mg elemental iron. EGD looked good, will keep PPI going and follow up Saxenda tolerance/efficacy in 2-3 months. Overall, maintaining a 74 lb reduction in body weight from original starting weight of 270 lbs.     Donna Bolanos feels as if she has achieved the goals she hoped to accomplish through bariatric surgery and weight loss.    No diagnosis found.      Current Outpatient Medications:      B Complex Vitamins (VITAMIN B COMPLEX PO), Take 1 drop by mouth daily., Disp: , Rfl:      busPIRone (BUSPAR) 10 MG tablet, TAKE 1 TABLET BY MOUTH TWICE A DAY, Disp: , Rfl:      calcium citrate-vitamin D  (CITRACAL) 315-200 MG-UNIT TABS per tablet, Take 1 tablet by mouth, Disp: , Rfl:      calcium-vitamin D (OSCAL) 250-125 MG-UNIT TABS per tablet, , Disp: , Rfl:      Cholecalciferol (VITAMIN D) 125 MCG (5000 UT) capsule, , Disp: , Rfl:      cyanocobalamin 1000 MCG SUBL, Place 1,000 mcg under the tongue, Disp: , Rfl:      diclofenac (VOLTAREN) 1 % topical gel, Apply topically once or twice daily if needed for neck pain., Disp: , Rfl:      ferrous fumarate (FERRETTS) 324 (106 Fe) MG TABS tablet, , Disp: , Rfl:      FLUoxetine (PROZAC) 20 MG capsule, TAKE 2 CAPSULES BY MOUTH ONCE A DAY, Disp: , Rfl:      FLUoxetine (PROZAC) 40 MG capsule, TAKE 1 CAPSULE BY MOUTH ONCE A DAY, TAKE WITH 20 MG CAPSULE (TOTAL DOSE 60 MG), Disp: , Rfl:      gabapentin (NEURONTIN) 300 MG capsule, Take 300 mg by mouth, Disp: , Rfl:      hydrOXYzine (ATARAX) 10 MG tablet, TAKE 2 TABLETS BY MOUTH ONCE A DAY AS NEEDED FOR ANXIETY **NEW DIRECTIONS!**, Disp: , Rfl:      hydrOXYzine (VISTARIL) 25 MG capsule, TAKE 1 CAPSULE BY MOUTH AT BEDTIME FOR SLEEP, Disp: , Rfl:      insulin pen needle (31G X 6 MM) 31G X 6 MM miscellaneous, Use 1 pen needles daily or as directed., Disp: 90 each, Rfl: 1     levonorgestrel (MIRENA) 20 MCG/24HR IUD, 1 each by Intrauterine route, Disp: , Rfl:      liraglutide - Weight Management (SAXENDA) 18 MG/3ML pen, Start 0.6mg injected daily for 2 weeks, then increase every 1-2 weeks by 0.6mg up to a max of 3mg/day if tolerated. (0.6mg to 1.2mg to 1.8mg to 2.4mg to 3.0mg/day)., Disp: 15 mL, Rfl: 3     Multiple Vitamins-Minerals (MULTIVITAL) TABS, Take 1 tablet by mouth daily., Disp: , Rfl:      omeprazole (PRILOSEC) 20 MG DR capsule, Take 1 capsule (20 mg) by mouth daily, Disp: 120 capsule, Rfl: 0     propranolol (INDERAL) 10 MG tablet, TAKE 2 TABLETS BY MOUTH ONCE A DAY AS NEEDED FOR ANXIETY., Disp: , Rfl:      traZODone (DESYREL) 50 MG tablet, TAKE 1-2 TABLETS BY MOUTH AT BEDTIME AS NEEDED FOR SLEEP. INDICATIONS: TROUBLE  SLEEPING, Disp: , Rfl:     Plan:  1. Saxenda dosing:  Skip 2 days and reduce dose to 2.4mg to see if better tolerated, if still too nauseated, skip 2 more days and reduce down to 1.8mg. Stay on the highest dose that is well tolerated. Stop Saxenda if severe pain/nausea/vomiting.    2. Continue good vitamin use. Recheck ferritin levels any time in March at your 3 day weekly regimen of Ferritt's Iron Fumarate.    3. Plan to check full bariatric labs this summer.   No follow-ups on file.    Bariatric Surgery Review     Interim History/LifeChanges: abdominoplasty and breast lift in December. Wearing binder still. Stitches healing well. [facility], Dr Garcia at [place].   Saxenda dose of 3.0mg as of last week. Notices some quesiness.       Patient Concerns: some nausea w/ top dose of Saxenda, discussed skipping a couple days, going down in dose to 2.4mg and see if that is better tolerated.  Appetite (1-10): low w/ saxenda.  GERD: EGD w/ Dr. Rhodes January of 2022 showed hiatal hernia, no esophagitis or Reeves's changes and recommendations to continue PPI therapy.  Medication changes: some anxiety meds/taking iron fumarate now 3 days weekly.    Vitamin Intake:   B-12   patch   MVI  patch w/o iron and iron every other day, better tolerated using Ferritt's 106mg fumarate   Vitamin D  patch   Calcium   n/a     Other  iron              LABS: ordered ferritin recheck. Will do full labs at her 3 year check in June/July.    Nausea mild to moderate at 3.0mg dose  Vomiting no  Constipation no  Diarrhea no  Rashes no  Hair Loss no  Reactive Hypoglycemia no  Light Headedness no   Moods no      Most recent labs:  Lab Results   Component Value Date    WBC 5.2 12/15/2020    HGB 14.1 12/15/2020    HCT 41.4 12/15/2020    MCV 96 12/15/2020     12/15/2020     Lab Results   Component Value Date    CHOL 184 05/15/2020     Lab Results   Component Value Date     05/15/2020     No components found for: LDLCALC  Lab  Results   Component Value Date    TRIG 73 05/15/2020     No components found for: CHOLHDL  Lab Results   Component Value Date    ALT 14 2021    AST 20 2021    ALKPHOS 49 2021     No results found for: HGBA1C  No components found for: SRAGHLEC82  No components found for: [id number]  No components found for: FERRITIN  No components found for: PTH  No components found for: 19044  No components found for: 7597  Lab Results   Component Value Date    TSH 2.59 05/15/2020     No results found for: TESTOSTERONE  Used protein shakes to help incision healing following breast lift and abdominoplasty.    Social History     Social History     Socioeconomic History     Marital status:      Spouse name: Not on file     Number of children: Not on file     Years of education: Not on file     Highest education level: Not on file   Occupational History     Not on file   Tobacco Use     Smoking status: Former Smoker     Quit date: 2010     Years since quittin.0     Smokeless tobacco: Never Used   Substance and Sexual Activity     Alcohol use: Yes     Comment: 1 time per week 2-4 per time     Drug use: No     Sexual activity: Yes     Partners: Male     Birth control/protection: Condom, I.U.D.   Other Topics Concern     Not on file   Social History Narrative     Not on file     Social Determinants of Health     Financial Resource Strain: Not on file   Food Insecurity: Not on file   Transportation Needs: Not on file   Physical Activity: Not on file   Stress: Not on file   Social Connections: Not on file   Intimate Partner Violence: Not on file   Housing Stability: Not on file       Past Medical History     Past Medical History:   Diagnosis Date     Arthritis     Neck     Gastroesophageal reflux disease      Obesity      Problem List     Patient Active Problem List   Diagnosis     Anal fistula     History of sleeve gastrectomy     Postoperative malabsorption     S/P bariatric surgery     Medications  "    [unfilled]  Surgical History     Past Surgical History  She has a past surgical history that includes Pr Lap, Celso Restrict Proc, Longitudinal Gastrectomy (N/A, 2019); Pr Surg Diagnostic Exam, Anorectal (N/A, 2019); Incision And Drainage Perirectal Abscess (N/A, 10/31/2019); Fissurectomy rectum (N/A, 2019); and Esophagoscopy, gastroscopy, duodenoscopy (EGD), combined (N/A, 2022).    Objective-Exam     Constitutional:  Ht 1.74 m (5' 8.5\")   Wt 88.9 kg (196 lb)   BMI 29.37 kg/m    [unfilled]   General:  Pleasant and in no acute distress on phone.      Counseling     We reviewed the important post op bariatric recommendations:  -eating 3 meals daily  -eating protein first, getting >60gm protein daily  -eating slowly, chewing food well  -avoiding/limiting calorie containing beverages  -drinking water 15-30 minutes before or after meals  -limiting restaurant or cafeteria eating to twice a week or less    We discussed the importance of restorative sleep and stress management in maintaining a healthy weight.  We discussed the National Weight Control Registry healthy weight maintenance strategies and ways to optimize metabolism.  We discussed the importance of physical activity including cardiovascular and strength training in maintaining a healthier weight.    We discussed the importance of life-long vitamin supplementation and life-long  follow-up.    Donna was reminded that, to avoid marginal ulcers she should avoid tobacco at all, alcohol in excess, caffeine in excess, and NSAIDS (unless indicated for cardioprotection or othewise and opposed by a PPI).    Alfa Branch MD    [facility].  2022  3:13 PM  [phone] (clinic phone)  [phone] (fax)    No images are attached to the encounter.  Medical Decision Makin minutes spent on the date of the encounter doing chart review, history and exam, documentation and further activities per the " note      Video-Visit Details    Type of service:  Video Visit    Video End Time (time video stopped): 3:30 PM  Originating Location (pt. Location): Home    Distant Location (provider location):  [facility]     Platform used for Video Visit: [employer] Licai

## 2022-02-02 NOTE — LETTER
2/2/2022         RE: Donna Bolanos  [address]  [place] [address]        Dear Colleague,    Thank you for referring your patient, Donna Bolanos, to the [facility] SURGERY CLINIC AND BARIATRICS CARE [place]. Please see a copy of my visit note below.    Donna Bolanos is 36 year old  female who presents for a billable video visit today.    How would you like to obtain your AVS? MyChart  If dropped from the video visit, the video invitation should be resent by: Text to cell phone: [phone]  Will anyone else be joining your video visit? No      Video Start Time: 3:13 PM    Provider Notes:   Bariatric Follow Up Visit with a History of Previous Bariatric Surgery     Date of visit: 2/2/2022  Physician: Alfa Branch MD, MD  Primary Care Provider:  Gela Hummel  Donna Bolanos   36 year old  female    Date of Surgery: 5/7/19  Initial Weight: 270 lbs  Initial BMI: 40.8  Today's Weight:   Wt Readings from Last 1 Encounters:   02/02/22 88.9 kg (196 lb)     Weight history:   Wt Readings from Last 4 Encounters:   02/02/22 88.9 kg (196 lb)   01/24/22 92.5 kg (204 lb)   10/25/21 88.9 kg (196 lb)   07/19/21 88.9 kg (196 lb)      Body mass index is 29.37 kg/m .      Assessment and Plan     Assessment: Donna is a 36 year old year old female who is 2.6 years s/p  Sleeve Gastrectomy with Dr. Louis. She'd had some weight regain and started Saxenda therapy this winter. Noticing decreased appetite but a bit nauseated at highest dose so we'll titrate down to best tolerated level. Iron supplementation going better now w/ non iron patch and 3 days weekly ferritt's fumarate 106mg elemental iron. EGD looked good, will keep PPI going and follow up Saxenda tolerance/efficacy in 2-3 months. Overall, maintaining a 74 lb reduction in body weight from original starting weight of 270 lbs.     Donna Bolanos feels as if she has achieved the goals she hoped to accomplish through bariatric surgery and weight  loss.    No diagnosis found.      Current Outpatient Medications:      B Complex Vitamins (VITAMIN B COMPLEX PO), Take 1 drop by mouth daily., Disp: , Rfl:      busPIRone (BUSPAR) 10 MG tablet, TAKE 1 TABLET BY MOUTH TWICE A DAY, Disp: , Rfl:      calcium citrate-vitamin D (CITRACAL) 315-200 MG-UNIT TABS per tablet, Take 1 tablet by mouth, Disp: , Rfl:      calcium-vitamin D (OSCAL) 250-125 MG-UNIT TABS per tablet, , Disp: , Rfl:      Cholecalciferol (VITAMIN D) 125 MCG (5000 UT) capsule, , Disp: , Rfl:      cyanocobalamin 1000 MCG SUBL, Place 1,000 mcg under the tongue, Disp: , Rfl:      diclofenac (VOLTAREN) 1 % topical gel, Apply topically once or twice daily if needed for neck pain., Disp: , Rfl:      ferrous fumarate (FERRETTS) 324 (106 Fe) MG TABS tablet, , Disp: , Rfl:      FLUoxetine (PROZAC) 20 MG capsule, TAKE 2 CAPSULES BY MOUTH ONCE A DAY, Disp: , Rfl:      FLUoxetine (PROZAC) 40 MG capsule, TAKE 1 CAPSULE BY MOUTH ONCE A DAY, TAKE WITH 20 MG CAPSULE (TOTAL DOSE 60 MG), Disp: , Rfl:      gabapentin (NEURONTIN) 300 MG capsule, Take 300 mg by mouth, Disp: , Rfl:      hydrOXYzine (ATARAX) 10 MG tablet, TAKE 2 TABLETS BY MOUTH ONCE A DAY AS NEEDED FOR ANXIETY **NEW DIRECTIONS!**, Disp: , Rfl:      hydrOXYzine (VISTARIL) 25 MG capsule, TAKE 1 CAPSULE BY MOUTH AT BEDTIME FOR SLEEP, Disp: , Rfl:      insulin pen needle (31G X 6 MM) 31G X 6 MM miscellaneous, Use 1 pen needles daily or as directed., Disp: 90 each, Rfl: 1     levonorgestrel (MIRENA) 20 MCG/24HR IUD, 1 each by Intrauterine route, Disp: , Rfl:      liraglutide - Weight Management (SAXENDA) 18 MG/3ML pen, Start 0.6mg injected daily for 2 weeks, then increase every 1-2 weeks by 0.6mg up to a max of 3mg/day if tolerated. (0.6mg to 1.2mg to 1.8mg to 2.4mg to 3.0mg/day)., Disp: 15 mL, Rfl: 3     Multiple Vitamins-Minerals (MULTIVITAL) TABS, Take 1 tablet by mouth daily., Disp: , Rfl:      omeprazole (PRILOSEC) 20 MG DR capsule, Take 1 capsule (20 mg) by  mouth daily, Disp: 120 capsule, Rfl: 0     propranolol (INDERAL) 10 MG tablet, TAKE 2 TABLETS BY MOUTH ONCE A DAY AS NEEDED FOR ANXIETY., Disp: , Rfl:      traZODone (DESYREL) 50 MG tablet, TAKE 1-2 TABLETS BY MOUTH AT BEDTIME AS NEEDED FOR SLEEP. INDICATIONS: TROUBLE SLEEPING, Disp: , Rfl:     Plan:  1. Saxenda dosing:  Skip 2 days and reduce dose to 2.4mg to see if better tolerated, if still too nauseated, skip 2 more days and reduce down to 1.8mg. Stay on the highest dose that is well tolerated. Stop Saxenda if severe pain/nausea/vomiting.    2. Continue good vitamin use. Recheck ferritin levels any time in March at your 3 day weekly regimen of Ferritt's Iron Fumarate.    3. Plan to check full bariatric labs this summer.   No follow-ups on file.    Bariatric Surgery Review     Interim History/LifeChanges: abdominoplasty and breast lift in December. Wearing binder still. Stitches healing well. [facility], Dr Garcia at [place].   Saxenda dose of 3.0mg as of last week. Notices some quesiness.       Patient Concerns: some nausea w/ top dose of Saxenda, discussed skipping a couple days, going down in dose to 2.4mg and see if that is better tolerated.  Appetite (1-10): low w/ saxenda.  GERD: EGD w/ Dr. Rhodes January of 2022 showed hiatal hernia, no esophagitis or Reeves's changes and recommendations to continue PPI therapy.  Medication changes: some anxiety meds/taking iron fumarate now 3 days weekly.    Vitamin Intake:   B-12   patch   MVI  patch w/o iron and iron every other day, better tolerated using Ferritt's 106mg fumarate   Vitamin D  patch   Calcium   n/a     Other  iron              LABS: ordered ferritin recheck. Will do full labs at her 3 year check in June/July.    Nausea mild to moderate at 3.0mg dose  Vomiting no  Constipation no  Diarrhea no  Rashes no  Hair Loss no  Reactive Hypoglycemia no  Light Headedness no   Moods no      Most recent labs:  Lab Results   Component Value Date    WBC 5.2  12/15/2020    HGB 14.1 12/15/2020    HCT 41.4 12/15/2020    MCV 96 12/15/2020     12/15/2020     Lab Results   Component Value Date    CHOL 184 05/15/2020     Lab Results   Component Value Date     05/15/2020     No components found for: LDLCALC  Lab Results   Component Value Date    TRIG 73 05/15/2020     No components found for: CHOLHDL  Lab Results   Component Value Date    ALT 14 2021    AST 20 2021    ALKPHOS 49 2021     No results found for: HGBA1C  No components found for: IBMCEXDL20  No components found for: VJIAWJUA64SW  No components found for: FERRITIN  No components found for: PTH  No components found for: 78123  No components found for: 7597  Lab Results   Component Value Date    TSH 2.59 05/15/2020     No results found for: TESTOSTERONE  Used protein shakes to help incision healing following breast lift and abdominoplasty.    Social History     Social History     Socioeconomic History     Marital status:      Spouse name: Not on file     Number of children: Not on file     Years of education: Not on file     Highest education level: Not on file   Occupational History     Not on file   Tobacco Use     Smoking status: Former Smoker     Quit date: 2010     Years since quittin.0     Smokeless tobacco: Never Used   Substance and Sexual Activity     Alcohol use: Yes     Comment: 1 time per week 2-4 per time     Drug use: No     Sexual activity: Yes     Partners: Male     Birth control/protection: Condom, I.U.D.   Other Topics Concern     Not on file   Social History Narrative     Not on file     Social Determinants of Health     Financial Resource Strain: Not on file   Food Insecurity: Not on file   Transportation Needs: Not on file   Physical Activity: Not on file   Stress: Not on file   Social Connections: Not on file   Intimate Partner Violence: Not on file   Housing Stability: Not on file       Past Medical History     Past Medical History:   Diagnosis Date  "    Arthritis     Neck     Gastroesophageal reflux disease      Obesity      Problem List     Patient Active Problem List   Diagnosis     Anal fistula     History of sleeve gastrectomy     Postoperative malabsorption     S/P bariatric surgery     Medications     [unfilled]  Surgical History     Past Surgical History  She has a past surgical history that includes Pr Lap, Celso Restrict Proc, Longitudinal Gastrectomy (N/A, 5/7/2019); Pr Surg Diagnostic Exam, Anorectal (N/A, 7/2/2019); Incision And Drainage Perirectal Abscess (N/A, 10/31/2019); Fissurectomy rectum (N/A, 12/27/2019); and Esophagoscopy, gastroscopy, duodenoscopy (EGD), combined (N/A, 1/26/2022).    Objective-Exam     Constitutional:  Ht 1.74 m (5' 8.5\")   Wt 88.9 kg (196 lb)   BMI 29.37 kg/m    [unfilled]   General:  Pleasant and in no acute distress on phone.      Counseling     We reviewed the important post op bariatric recommendations:  -eating 3 meals daily  -eating protein first, getting >60gm protein daily  -eating slowly, chewing food well  -avoiding/limiting calorie containing beverages  -drinking water 15-30 minutes before or after meals  -limiting restaurant or cafeteria eating to twice a week or less    We discussed the importance of restorative sleep and stress management in maintaining a healthy weight.  We discussed the National Weight Control Registry healthy weight maintenance strategies and ways to optimize metabolism.  We discussed the importance of physical activity including cardiovascular and strength training in maintaining a healthier weight.    We discussed the importance of life-long vitamin supplementation and life-long  follow-up.    Donna was reminded that, to avoid marginal ulcers she should avoid tobacco at all, alcohol in excess, caffeine in excess, and NSAIDS (unless indicated for cardioprotection or othewise and opposed by a PPI).    Alfa Branch MD    [facility].  2/2/2022  3:13 " PM  [phone] (clinic phone)  [phone] (fax)    No images are attached to the encounter.  Medical Decision Makin minutes spent on the date of the encounter doing chart review, history and exam, documentation and further activities per the note      Video-Visit Details    Type of service:  Video Visit    Video End Time (time video stopped): 3:30 PM  Originating Location (pt. Location): Home    Distant Location (provider location):  [facility] SURGERY [facility] AND BARIATRICS [facility]     Platform used for Video Visit: Lane      Again, thank you for allowing me to participate in the care of your patient.        Sincerely,        Alfa Branch MD

## 2022-02-10 ENCOUNTER — VIRTUAL VISIT (OUTPATIENT)
Dept: SURGERY | Facility: CLINIC | Age: 37
End: 2022-02-10
Payer: COMMERCIAL

## 2022-02-10 VITALS — HEIGHT: 69 IN | WEIGHT: 196 LBS | BODY MASS INDEX: 29.03 KG/M2

## 2022-02-10 DIAGNOSIS — E66.3 OVERWEIGHT: ICD-10-CM

## 2022-02-10 DIAGNOSIS — Z98.84 BARIATRIC SURGERY STATUS: Primary | ICD-10-CM

## 2022-02-10 PROCEDURE — 97803 MED NUTRITION INDIV SUBSEQ: CPT | Mod: GT | Performed by: DIETITIAN, REGISTERED

## 2022-02-10 ASSESSMENT — MIFFLIN-ST. JEOR: SCORE: 1635.49

## 2022-02-10 NOTE — LETTER
2/10/2022         RE: Donna Bolanos  [address] [place] [address]        Dear Colleague,    Thank you for referring your patient, Donna Bolanos, to the [facility] SURGERY CLINIC AND BARIATRICS CARE [place]. Please see a copy of my visit note below.    Donna Bolanos is a 36 year old who is being evaluated via a billable video visit.      How would you like to obtain your AVS? MyChart  If the video visit is dropped, the invitation should be resent by: Send to e-mail at: [email or web address]  Will anyone else be joining your video visit? No      Video Start Time: 2:23 PM      Post-op Surgical Weight Loss Diet Evaluation     Assessment:  Pt presents for 2.5 years post-op RD visit, s/p LSG on 5-17-19 with Dr. Louis. Today we reviewed current eating habits and level of physical activity, and instructed on the changes that are required for successful bariatric outcomes.    Patient Progress: getting hunger pains between meals- taking saxenda  +lowest weight was 179lb last summer- postponed wedding 2 times- very high stress- working with a therapist    Initial weight: 270lb  Current weight: 196lb  Weight change: down 74lb    Body mass index is 29.37 kg/m .    Patient Active Problem List   Diagnosis     Anal fistula     History of sleeve gastrectomy     Postoperative malabsorption     S/P bariatric surgery       Vitamins   Multi Vit with Iron: patch and separate iron  Calcium Citrate: yes  B12: yes  D3: yes    Do you experience any reflux or discomfort with eating? Yes- has a hiatal hernia    Diet Recall/Time:   Breakfast: protein shake in coffee (maybe 1/4 shake) and glass of milk  9:30- small snack  Lunch: 11:30a- chicken breast with ng, taco meat, leftovers, etc.   Dinner: protein, rice and a sauce, veggies    Typical Snacks: cheese and crackers, apple and peanut butter      Estimated protein intake: 60-80 grams    Estimated portion size per meals:3/4 cup/meal    Meal Duration:20-30  "min    Fluid-meal separation:  Fluids are  30min before and 30 minutes after meals.    Fluid Intake  Water: at least 40oz, caffeine free tea  Caffeine: coffee in morning  Alcohol: couple times per week- no more than 4 for a weekend    Exercise: not cleared for activity yet due to recently plastic surgery- hoping to start yoga soon      PES statement:      (NC-1.4) Altered GI Function related to Alteration in gastrointestinal tract structure and/or function/ Decreased functional length of the GI tract as evidenced by Weight loss of 27.41% initial body weight; sleeve gastrectomy    Intervention    Discussion  1. Recommended to consume 15-20gm protein at 3 meals daily, along with protein supplement/\"planned protein containing snack\" of 15-30gm protein, to reach goal of 60-80 gm protein daily.  2. Reviewed lean protein sources  3. Bariatric Plate Method-  including lean/low fat protein at each meal, including a vegetable/fruit, and limiting carbohydrate intake to less than 25% of plate volume.     Assessment/Plan:    Pt to follow up in 2 months with bariatrician      Video-Visit Details    Type of service:  Video Visit    Video End Time:2:55 PM    Originating Location (pt. Location): Home    Distant Location (provider location):  [facility] SURGERY CLINIC AND BARIATRICS CARE [place]     Platform used for Video Visit: Dorita FRIAS RD          Again, thank you for allowing me to participate in the care of your patient.        Sincerely,        CHERIE FRIAS RD    "

## 2022-02-10 NOTE — PROGRESS NOTES
Donna Bolanos is a 36 year old who is being evaluated via a billable video visit.      How would you like to obtain your AVS? MyChart  If the video visit is dropped, the invitation should be resent by: Send to e-mail at: [email or web address]  Will anyone else be joining your video visit? No      Video Start Time: 2:23 PM      Post-op Surgical Weight Loss Diet Evaluation     Assessment:  Pt presents for 2.5 years post-op RD visit, s/p LSG on 5-17-19 with Dr. Louis. Today we reviewed current eating habits and level of physical activity, and instructed on the changes that are required for successful bariatric outcomes.    Patient Progress: getting hunger pains between meals- taking saxenda  +lowest weight was 179lb last summer- postponed wedding 2 times- very high stress- working with a therapist    Initial weight: 270lb  Current weight: 196lb  Weight change: down 74lb    Body mass index is 29.37 kg/m .    Patient Active Problem List   Diagnosis     Anal fistula     History of sleeve gastrectomy     Postoperative malabsorption     S/P bariatric surgery       Vitamins   Multi Vit with Iron: patch and separate iron  Calcium Citrate: yes  B12: yes  D3: yes    Do you experience any reflux or discomfort with eating? Yes- has a hiatal hernia    Diet Recall/Time:   Breakfast: protein shake in coffee (maybe 1/4 shake) and glass of milk  9:30- small snack  Lunch: 11:30a- chicken breast with ng, taco meat, leftovers, etc.   Dinner: protein, rice and a sauce, veggies    Typical Snacks: cheese and crackers, apple and peanut butter      Estimated protein intake: 60-80 grams    Estimated portion size per meals:3/4 cup/meal    Meal Duration:20-30 min    Fluid-meal separation:  Fluids are  30min before and 30 minutes after meals.    Fluid Intake  Water: at least 40oz, caffeine free tea  Caffeine: coffee in morning  Alcohol: couple times per week- no more than 4 for a weekend    Exercise: not cleared for activity yet due to  "recently plastic surgery- hoping to start yoga soon      PES statement:      (NC-1.4) Altered GI Function related to Alteration in gastrointestinal tract structure and/or function/ Decreased functional length of the GI tract as evidenced by Weight loss of 27.41% initial body weight; sleeve gastrectomy    Intervention    Discussion  1. Recommended to consume 15-20gm protein at 3 meals daily, along with protein supplement/\"planned protein containing snack\" of 15-30gm protein, to reach goal of 60-80 gm protein daily.  2. Reviewed lean protein sources  3. Bariatric Plate Method-  including lean/low fat protein at each meal, including a vegetable/fruit, and limiting carbohydrate intake to less than 25% of plate volume.     Assessment/Plan:    Pt to follow up in 2 months with bariatrician      Video-Visit Details    Type of service:  Video Visit    Video End Time:2:55 PM    Originating Location (pt. Location): Home    Distant Location (provider location):  [facility] SURGERY CLINIC AND BARIATRICS CARE [place]     Platform used for Video Visit: Dorita FRIAS RD      "

## 2022-02-11 ENCOUNTER — LAB (OUTPATIENT)
Dept: LAB | Facility: CLINIC | Age: 37
End: 2022-02-11
Payer: COMMERCIAL

## 2022-02-11 DIAGNOSIS — K91.2 POSTOPERATIVE MALABSORPTION: ICD-10-CM

## 2022-02-11 DIAGNOSIS — R79.0 LOW FERRITIN: ICD-10-CM

## 2022-02-11 DIAGNOSIS — Z90.3 HISTORY OF SLEEVE GASTRECTOMY: ICD-10-CM

## 2022-02-11 LAB — FERRITIN SERPL-MCNC: 33 NG/ML (ref 10–130)

## 2022-02-11 PROCEDURE — 82728 ASSAY OF FERRITIN: CPT

## 2022-02-11 PROCEDURE — 36415 COLL VENOUS BLD VENIPUNCTURE: CPT

## 2022-03-12 ENCOUNTER — HEALTH MAINTENANCE LETTER (OUTPATIENT)
Age: 37
End: 2022-03-12

## 2022-04-06 ENCOUNTER — VIRTUAL VISIT (OUTPATIENT)
Dept: SURGERY | Facility: CLINIC | Age: 37
End: 2022-04-06
Payer: COMMERCIAL

## 2022-04-06 VITALS — HEIGHT: 69 IN | WEIGHT: 198.6 LBS | BODY MASS INDEX: 29.41 KG/M2

## 2022-04-06 DIAGNOSIS — Z86.39 HISTORY OF MORBID OBESITY: Primary | ICD-10-CM

## 2022-04-06 PROCEDURE — 99214 OFFICE O/P EST MOD 30 MIN: CPT | Mod: GT | Performed by: EMERGENCY MEDICINE

## 2022-04-06 RX ORDER — NALTREXONE HYDROCHLORIDE 50 MG/1
TABLET, FILM COATED ORAL
Qty: 90 TABLET | Refills: 0 | Status: SHIPPED | OUTPATIENT
Start: 2022-04-06 | End: 2022-07-21

## 2022-04-06 RX ORDER — MIRTAZAPINE 7.5 MG/1
7.5 TABLET, FILM COATED ORAL AT BEDTIME
COMMUNITY
Start: 2022-03-28 | End: 2022-07-06

## 2022-04-06 RX ORDER — FLUOXETINE 10 MG/1
CAPSULE ORAL
COMMUNITY
Start: 2021-12-16 | End: 2022-04-12

## 2022-04-06 RX ORDER — PHENTERMINE HYDROCHLORIDE 37.5 MG/1
TABLET ORAL
Qty: 45 TABLET | Refills: 0 | Status: SHIPPED | OUTPATIENT
Start: 2022-04-06 | End: 2022-10-03 | Stop reason: ALTCHOICE

## 2022-04-06 NOTE — LETTER
4/6/2022         RE: Donna Bolanos  [address] [place] [address]        Dear Colleague,    Thank you for referring your patient, Donna Bolanos, to the [facility]. Please see a copy of my visit note below.    Donna Bolanos is 37 year old  female who presents for a billable video visit today.    How would you like to obtain your AVS? MyChart  If dropped from the video visit, the video invitation should be resent by: Text to cell phone: [phone]  Will anyone else be joining your video visit? No      Video Start Time: see my note    Are there any specific questions or needs that you would like addressed at your visit today? No    Provider Notes: see above      Video-Visit Details    Type of service:  Video Visit    Video End Time (time video stopped): 2:39 PM  Originating Location (pt. Location): Home    Distant Location (provider location):  [facility]     Platform used for Video Visit: [facility]    Bariatric Follow Up Visit with a History of Previous Bariatric Surgery     Date of visit: 4/6/2022  Physician: Alfa Branch MD, MD  Primary Care Provider:  Gela Hummel  Donna Bolanos   37 year old  female    Date of Surgery: 5/7/19  Initial Weight: 270 lbs  Initial BMI: 40.8  Today's Weight:   Wt Readings from Last 1 Encounters:   04/06/22 90.1 kg (198 lb 9.6 oz)     Weight history:   Wt Readings from Last 4 Encounters:   04/06/22 90.1 kg (198 lb 9.6 oz)   02/10/22 88.9 kg (196 lb)   02/02/22 88.9 kg (196 lb)   01/24/22 92.5 kg (204 lb)      Body mass index is 29.76 kg/m .      Assessment and Plan     Assessment: Donna is a 37 year old year old female who is nearly 3 years s/p  Sleeve Gastrectomy with Dr. Louis.  She's been using combined medical/dietary therapy to complement her previous bariatric surgery tool with the addition of Saxenda (liraglutide) since December of 2021.  Weight  loss has been modest, her approval/prior authorization runs out tomorrow and weight reduction has not met 5% goal so we'll discontinue this medication due to lack of additional efficacy.    We'll plan to switch back to phentermine therapy which her anxiety tolerated well in the past and some evening naltrexone for further support and check tolerance/vitals and weight at her annual bariatric visit at the end of June..     Donna Bolanos feels as if she has not yet achieved the goals she hoped to accomplish through bariatric surgery and weight loss but is down about 72 lbs total from intake visit pre sleeve gastrectomy.    Encounter Diagnosis   Name Primary?     History of morbid obesity Yes         Current Outpatient Medications:      B Complex Vitamins (VITAMIN B COMPLEX PO), Take 1 drop by mouth daily., Disp: , Rfl:      busPIRone (BUSPAR) 10 MG tablet, TAKE 1 TABLET BY MOUTH TWICE A DAY, Disp: , Rfl:      calcium citrate-vitamin D (CITRACAL) 315-200 MG-UNIT TABS per tablet, Take 1 tablet by mouth, Disp: , Rfl:      calcium-vitamin D (OSCAL) 250-125 MG-UNIT TABS per tablet, , Disp: , Rfl:      Cholecalciferol (VITAMIN D) 125 MCG (5000 UT) capsule, , Disp: , Rfl:      cyanocobalamin 1000 MCG SUBL, Place 1,000 mcg under the tongue, Disp: , Rfl:      diclofenac (VOLTAREN) 1 % topical gel, Apply topically once or twice daily if needed for neck pain., Disp: , Rfl:      ferrous fumarate (FERRETTS) 324 (106 Fe) MG TABS tablet, , Disp: , Rfl:      FLUoxetine (PROZAC) 10 MG capsule, BEGIN TAKING ONE CAPSULE ONCE DAILY FOR TWO WEEKS, THEN INCREASE TO TWO CAPSULES ONCE PER DAY ONGOING., Disp: , Rfl:      FLUoxetine (PROZAC) 20 MG capsule, TAKE 2 CAPSULES BY MOUTH ONCE A DAY, Disp: , Rfl:      FLUoxetine (PROZAC) 40 MG capsule, TAKE 1 CAPSULE BY MOUTH ONCE A DAY, TAKE WITH 20 MG CAPSULE (TOTAL DOSE 60 MG), Disp: , Rfl:      gabapentin (NEURONTIN) 300 MG capsule, Take 300 mg by mouth, Disp: , Rfl:      hydrOXYzine (ATARAX) 10  MG tablet, TAKE 2 TABLETS BY MOUTH ONCE A DAY AS NEEDED FOR ANXIETY **NEW DIRECTIONS!**, Disp: , Rfl:      hydrOXYzine (VISTARIL) 25 MG capsule, TAKE 1 CAPSULE BY MOUTH AT BEDTIME FOR SLEEP, Disp: , Rfl:      levonorgestrel (MIRENA) 20 MCG/24HR IUD, 1 each by Intrauterine route, Disp: , Rfl:      mirtazapine (REMERON) 7.5 MG tablet, Take 7.5 mg by mouth At Bedtime, Disp: , Rfl:      Multiple Vitamins-Minerals (MULTIVITAL) TABS, Take 1 tablet by mouth daily., Disp: , Rfl:      naltrexone (DEPADE/REVIA) 50 MG tablet, Start half a tablet daily with supper for 7 days then increase, if tolerated to half a tablet with breakfast and supper., Disp: 90 tablet, Rfl: 0     omeprazole (PRILOSEC) 20 MG DR capsule, Take 1 capsule (20 mg) by mouth daily, Disp: 120 capsule, Rfl: 0     phentermine (ADIPEX-P) 37.5 MG tablet, Start half tablet daily after breakfast., Disp: 45 tablet, Rfl: 0     propranolol (INDERAL) 10 MG tablet, TAKE 2 TABLETS BY MOUTH ONCE A DAY AS NEEDED FOR ANXIETY., Disp: , Rfl:      traZODone (DESYREL) 50 MG tablet, TAKE 1-2 TABLETS BY MOUTH AT BEDTIME AS NEEDED FOR SLEEP. INDICATIONS: TROUBLE SLEEPING (Patient not taking: Reported on 4/6/2022), Disp: , Rfl:     Plan:    No follow-ups on file.    Bariatric Surgery Review     Interim History/LifeChanges: initially didn't tolerate the 3mg dose and reduced to 2.4mg then back up to 3mg/day.   Some ketobread/milk/PB breakfast.   Small meals more frequently given some hiatal hernia issues: may have cheese snack prior to lunch/peas/yogurt around 9:30am, 11:30am lunch.   Snack at midafternoon crackers/cheese.   Dinner 6:30-7pm  Doing PT and exercises more regularly: getting core/pelvic floor exercise: squats/kick backs and bowflex max  (similar to elliptical) on low setting 15 minutes twice weekly.   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 20 minutes both [place]. Some occ stretches through the day.     We talked through different options in medication and given  side effect/interaction risks of bupropion with her propranolol and anxiety drugs we'll go back to half tablet phentermine therapy which she tolerated well in the past with evening dose of naltrexone after supper to provide some further crave reduction and reasses efficacy tolerance at our in person visit at the end of June.     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    Reviewed whether any need/indication for screening EGD today and we will deferred. Dr. Louis previously evaluated.  Typically, a screening EGD is recommend post op year 2-3 if no symptoms to assess health of esophagus/bariatric surgery and sooner if difficult to control GERD or persistent pain/dysphagia sx despite behavior modification.    Medication changes:                  Moods good. Anxiety meds require prn dosing of propranolol once or twice weekly currently.      Most recent labs:  Lab Results   Component Value Date    WBC 5.2 12/15/2020    HGB 14.1 12/15/2020    HCT 41.4 12/15/2020    MCV 96 12/15/2020     12/15/2020     Lab Results   Component Value Date    CHOL 184 05/15/2020     Lab Results   Component Value Date     05/15/2020     No components found for: LDLCALC  Lab Results   Component Value Date    TRIG 73 05/15/2020     No results found for: CHOLHDL  Lab Results   Component Value Date    ALT 14 07/19/2021    AST 20 07/19/2021    ALKPHOS 49 07/19/2021     No results found for: HGBA1C  No components found for: KYTPROUT88  No components found for: [id number]  No components found for: FERRITIN  No components found for: PTH  No components found for: 93011  No components found for: 7597  Lab Results   Component Value Date    TSH 2.59 05/15/2020     No results found for: TESTOSTERONE    See above for meal  "timing/recall/exercise.    Social History     Social History     Socioeconomic History     Marital status:      Spouse name: Not on file     Number of children: Not on file     Years of education: Not on file     Highest education level: Not on file   Occupational History     Not on file   Tobacco Use     Smoking status: Former Smoker     Quit date: 2010     Years since quittin.1     Smokeless tobacco: Never Used   Substance and Sexual Activity     Alcohol use: Yes     Comment: 1 time per week 2-4 per time     Drug use: No     Sexual activity: Yes     Partners: Male     Birth control/protection: Condom, I.U.D.   Other Topics Concern     Not on file   Social History Narrative     Not on file     Social Determinants of Health     Financial Resource Strain: Not on file   Food Insecurity: Not on file   Transportation Needs: Not on file   Physical Activity: Not on file   Stress: Not on file   Social Connections: Not on file   Intimate Partner Violence: Not on file   Housing Stability: Not on file       Past Medical History     Past Medical History:   Diagnosis Date     Arthritis     Neck     Gastroesophageal reflux disease      Obesity      Problem List     Patient Active Problem List   Diagnosis     Anal fistula     History of sleeve gastrectomy     Postoperative malabsorption     S/P bariatric surgery     Medications     [unfilled]  Surgical History     Past Surgical History  She has a past surgical history that includes Pr Lap, Celso Restrict Proc, Longitudinal Gastrectomy (N/A, 2019); Pr Surg Diagnostic Exam, Anorectal (N/A, 2019); Incision And Drainage Perirectal Abscess (N/A, 10/31/2019); Fissurectomy rectum (N/A, 2019); and Esophagoscopy, gastroscopy, duodenoscopy (EGD), combined (N/A, 2022).    Objective-Exam     Constitutional:  Ht 1.74 m (5' 8.5\")   Wt 90.1 kg (198 lb 9.6 oz)   BMI 29.76 kg/m    [unfilled]   General:  Pleasant and in no acute distress   Eyes:  " EOMI  ENT:  Airway patent    Neck:  Respiratory: Normal respiratory effort, no cough, .  CV:  n/a  Gastrointestinal: n/a  Musculoskeletal: muscle mass WNL  Skin: color without pallor hair long,   Psychiatric: alert and oriented X3, mood and affect normal    Counseling     We reviewed the important post op bariatric recommendations:  -eating 3 meals daily  -eating protein first, getting >60gm protein daily  -eating slowly, chewing food well  -avoiding/limiting calorie containing beverages  -drinking water 15-30 minutes before or after meals  -limiting restaurant or cafeteria eating to twice a week or less    We discussed the importance of restorative sleep and stress management in maintaining a healthy weight.  We discussed the National Weight Control Registry healthy weight maintenance strategies and ways to optimize metabolism.  We discussed the importance of physical activity including cardiovascular and strength training in maintaining a healthier weight.    We discussed the importance of life-long vitamin supplementation and life-long  follow-up.    Donna was reminded that, to avoid marginal ulcers she should avoid tobacco at all, alcohol in excess, caffeine in excess, and NSAIDS (unless indicated for cardioprotection or othewise and opposed by a PPI).    Alfa Branch MD    [facility].  2022  2:09 PM  [phone] (clinic phone)  [phone] (fax)    No images are attached to the encounter.  Medical Decision Makin minutes spent on the date of the encounter doing chart review, history and exam, documentation and further activities per the note      Again, thank you for allowing me to participate in the care of your patient.        Sincerely,        Alfa Branch MD

## 2022-04-06 NOTE — PROGRESS NOTES
Bariatric Follow Up Visit with a History of Previous Bariatric Surgery     Date of visit: 4/6/2022  Physician: Alfa Branch MD, MD  Primary Care Provider:  Gela Hummelcassie Metcalfallan   37 year old  female    Date of Surgery: 5/7/19  Initial Weight: 270 lbs  Initial BMI: 40.8  Today's Weight:   Wt Readings from Last 1 Encounters:   04/06/22 90.1 kg (198 lb 9.6 oz)     Weight history:   Wt Readings from Last 4 Encounters:   04/06/22 90.1 kg (198 lb 9.6 oz)   02/10/22 88.9 kg (196 lb)   02/02/22 88.9 kg (196 lb)   01/24/22 92.5 kg (204 lb)      Body mass index is 29.76 kg/m .      Assessment and Plan     Assessment: Donna is a 37 year old year old female who is nearly 3 years s/p  Sleeve Gastrectomy with Dr. Louis.  She's been using combined medical/dietary therapy to complement her previous bariatric surgery tool with the addition of Saxenda (liraglutide) since December of 2021.  Weight loss has been modest, her approval/prior authorization runs out tomorrow and weight reduction has not met 5% goal so we'll discontinue this medication due to lack of additional efficacy.    We'll plan to switch back to phentermine therapy which her anxiety tolerated well in the past and some evening naltrexone for further support and check tolerance/vitals and weight at her annual bariatric visit at the end of June..     Donna Bolanos feels as if she has not yet achieved the goals she hoped to accomplish through bariatric surgery and weight loss but is down about 72 lbs total from intake visit pre sleeve gastrectomy.    Encounter Diagnosis   Name Primary?     History of morbid obesity Yes         Current Outpatient Medications:      B Complex Vitamins (VITAMIN B COMPLEX PO), Take 1 drop by mouth daily., Disp: , Rfl:      busPIRone (BUSPAR) 10 MG tablet, TAKE 1 TABLET BY MOUTH TWICE A DAY, Disp: , Rfl:      calcium citrate-vitamin D (CITRACAL) 315-200 MG-UNIT TABS per tablet, Take 1 tablet by mouth, Disp: , Rfl:       calcium-vitamin D (OSCAL) 250-125 MG-UNIT TABS per tablet, , Disp: , Rfl:      Cholecalciferol (VITAMIN D) 125 MCG (5000 UT) capsule, , Disp: , Rfl:      cyanocobalamin 1000 MCG SUBL, Place 1,000 mcg under the tongue, Disp: , Rfl:      diclofenac (VOLTAREN) 1 % topical gel, Apply topically once or twice daily if needed for neck pain., Disp: , Rfl:      ferrous fumarate (FERRETTS) 324 (106 Fe) MG TABS tablet, , Disp: , Rfl:      FLUoxetine (PROZAC) 10 MG capsule, BEGIN TAKING ONE CAPSULE ONCE DAILY FOR TWO WEEKS, THEN INCREASE TO TWO CAPSULES ONCE PER DAY ONGOING., Disp: , Rfl:      FLUoxetine (PROZAC) 20 MG capsule, TAKE 2 CAPSULES BY MOUTH ONCE A DAY, Disp: , Rfl:      FLUoxetine (PROZAC) 40 MG capsule, TAKE 1 CAPSULE BY MOUTH ONCE A DAY, TAKE WITH 20 MG CAPSULE (TOTAL DOSE 60 MG), Disp: , Rfl:      gabapentin (NEURONTIN) 300 MG capsule, Take 300 mg by mouth, Disp: , Rfl:      hydrOXYzine (ATARAX) 10 MG tablet, TAKE 2 TABLETS BY MOUTH ONCE A DAY AS NEEDED FOR ANXIETY **NEW DIRECTIONS!**, Disp: , Rfl:      hydrOXYzine (VISTARIL) 25 MG capsule, TAKE 1 CAPSULE BY MOUTH AT BEDTIME FOR SLEEP, Disp: , Rfl:      levonorgestrel (MIRENA) 20 MCG/24HR IUD, 1 each by Intrauterine route, Disp: , Rfl:      mirtazapine (REMERON) 7.5 MG tablet, Take 7.5 mg by mouth At Bedtime, Disp: , Rfl:      Multiple Vitamins-Minerals (MULTIVITAL) TABS, Take 1 tablet by mouth daily., Disp: , Rfl:      naltrexone (DEPADE/REVIA) 50 MG tablet, Start half a tablet daily with supper for 7 days then increase, if tolerated to half a tablet with breakfast and supper., Disp: 90 tablet, Rfl: 0     omeprazole (PRILOSEC) 20 MG DR capsule, Take 1 capsule (20 mg) by mouth daily, Disp: 120 capsule, Rfl: 0     phentermine (ADIPEX-P) 37.5 MG tablet, Start half tablet daily after breakfast., Disp: 45 tablet, Rfl: 0     propranolol (INDERAL) 10 MG tablet, TAKE 2 TABLETS BY MOUTH ONCE A DAY AS NEEDED FOR ANXIETY., Disp: , Rfl:      traZODone (DESYREL) 50 MG  tablet, TAKE 1-2 TABLETS BY MOUTH AT BEDTIME AS NEEDED FOR SLEEP. INDICATIONS: TROUBLE SLEEPING (Patient not taking: Reported on 4/6/2022), Disp: , Rfl:     Plan:    No follow-ups on file.    Bariatric Surgery Review     Interim History/LifeChanges: initially didn't tolerate the 3mg dose and reduced to 2.4mg then back up to 3mg/day.   Some ketobread/milk/PB breakfast.   Small meals more frequently given some hiatal hernia issues: may have cheese snack prior to lunch/peas/yogurt around 9:30am, 11:30am lunch.   Snack at midafternoon crackers/cheese.   Dinner 6:30-7pm  Doing PT and exercises more regularly: getting core/pelvic floor exercise: squats/kick backs and bowflex max  (similar to elliptical) on low setting 15 minutes twice weekly.   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 20 [place]. Some occ stretches through the day.     We talked through different options in medication and given side effect/interaction risks of bupropion with her propranolol and anxiety drugs we'll go back to half tablet phentermine therapy which she tolerated well in the past with evening dose of naltrexone after supper to provide some further crave reduction and reasses efficacy tolerance at our in person visit at the end of June.     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    Reviewed whether any need/indication for screening EGD today and we will deferred. Dr. Louis previously evaluated.  Typically, a screening EGD is recommend post op year 2-3 if no symptoms to assess health of esophagus/bariatric surgery and sooner if difficult to control GERD or persistent pain/dysphagia sx despite behavior modification.    Medication changes:                  Moods good. Anxiety meds require prn dosing of propranolol once or twice  weekly currently.      Most recent labs:  Lab Results   Component Value Date    WBC 5.2 12/15/2020    HGB 14.1 12/15/2020    HCT 41.4 12/15/2020    MCV 96 12/15/2020     12/15/2020     Lab Results   Component Value Date    CHOL 184 05/15/2020     Lab Results   Component Value Date     05/15/2020     No components found for: LDLCALC  Lab Results   Component Value Date    TRIG 73 05/15/2020     No results found for: CHOLHDL  Lab Results   Component Value Date    ALT 14 2021    AST 20 2021    ALKPHOS 49 2021     No results found for: HGBA1C  No components found for: [id number]  No components found for: [id number]  No components found for: FERRITIN  No components found for: PTH  No components found for: 07082  No components found for: 7597  Lab Results   Component Value Date    TSH 2.59 05/15/2020     No results found for: TESTOSTERONE    See above for meal timing/recall/exercise.    Social History     Social History     Socioeconomic History     Marital status:      Spouse name: Not on file     Number of children: Not on file     Years of education: Not on file     Highest education level: Not on file   Occupational History     Not on file   Tobacco Use     Smoking status: Former Smoker     Quit date: 2010     Years since quittin.1     Smokeless tobacco: Never Used   Substance and Sexual Activity     Alcohol use: Yes     Comment: 1 time per week 2-4 per time     Drug use: No     Sexual activity: Yes     Partners: Male     Birth control/protection: Condom, I.U.D.   Other Topics Concern     Not on file   Social History Narrative     Not on file     Social Determinants of Health     Financial Resource Strain: Not on file   Food Insecurity: Not on file   Transportation Needs: Not on file   Physical Activity: Not on file   Stress: Not on file   Social Connections: Not on file   Intimate Partner Violence: Not on file   Housing Stability: Not on file       Past Medical History  "    Past Medical History:   Diagnosis Date     Arthritis     Neck     Gastroesophageal reflux disease      Obesity      Problem List     Patient Active Problem List   Diagnosis     Anal fistula     History of sleeve gastrectomy     Postoperative malabsorption     S/P bariatric surgery     Medications     [unfilled]  Surgical History     Past Surgical History  She has a past surgical history that includes Pr Lap, Celso Restrict Proc, Longitudinal Gastrectomy (N/A, 5/7/2019); Pr Surg Diagnostic Exam, Anorectal (N/A, 7/2/2019); Incision And Drainage Perirectal Abscess (N/A, 10/31/2019); Fissurectomy rectum (N/A, 12/27/2019); and Esophagoscopy, gastroscopy, duodenoscopy (EGD), combined (N/A, 1/26/2022).    Objective-Exam     Constitutional:  Ht 1.74 m (5' 8.5\")   Wt 90.1 kg (198 lb 9.6 oz)   BMI 29.76 kg/m    [unfilled]   General:  Pleasant and in no acute distress   Eyes:  EOMI  ENT:  Airway patent    Neck:  Respiratory: Normal respiratory effort, no cough, .  CV:  n/a  Gastrointestinal: n/a  Musculoskeletal: muscle mass WNL  Skin: color without pallor hair long,   Psychiatric: alert and oriented X3, mood and affect normal    Counseling     We reviewed the important post op bariatric recommendations:  -eating 3 meals daily  -eating protein first, getting >60gm protein daily  -eating slowly, chewing food well  -avoiding/limiting calorie containing beverages  -drinking water 15-30 minutes before or after meals  -limiting restaurant or cafeteria eating to twice a week or less    We discussed the importance of restorative sleep and stress management in maintaining a healthy weight.  We discussed the National Weight Control Registry healthy weight maintenance strategies and ways to optimize metabolism.  We discussed the importance of physical activity including cardiovascular and strength training in maintaining a healthier weight.    We discussed the importance of life-long vitamin supplementation and life-long  " follow-up.    Donna was reminded that, to avoid marginal ulcers she should avoid tobacco at all, alcohol in excess, caffeine in excess, and NSAIDS (unless indicated for cardioprotection or othewise and opposed by a PPI).    Alfa Branch MD    [facility].  2022  2:09 PM  [phone] (clinic phone)  [phone] (fax)    No images are attached to the encounter.  Medical Decision Makin minutes spent on the date of the encounter doing chart review, history and exam, documentation and further activities per the note

## 2022-04-06 NOTE — PROGRESS NOTES
Donna Bolanos is 37 year old  female who presents for a billable video visit today.    How would you like to obtain your AVS? MyChart  If dropped from the video visit, the video invitation should be resent by: Text to cell phone: [phone]  Will anyone else be joining your video visit? No      Video Start Time: see my note    Are there any specific questions or needs that you would like addressed at your visit today? No    Provider Notes: see above      Video-Visit Details    Type of service:  Video Visit    Video End Time (time video stopped): 2:39 PM  Originating Location (pt. Location): Home    Distant Location (provider location):  [facility] [place]     Platform used for Video Visit: [employer]

## 2022-04-07 ENCOUNTER — TELEPHONE (OUTPATIENT)
Dept: SURGERY | Facility: CLINIC | Age: 37
End: 2022-04-07
Payer: COMMERCIAL

## 2022-04-12 DIAGNOSIS — Z90.3 HISTORY OF SLEEVE GASTRECTOMY: ICD-10-CM

## 2022-04-12 DIAGNOSIS — K21.9 GASTROESOPHAGEAL REFLUX DISEASE, UNSPECIFIED WHETHER ESOPHAGITIS PRESENT: ICD-10-CM

## 2022-05-12 DIAGNOSIS — R63.5 WEIGHT GAIN: Primary | ICD-10-CM

## 2022-06-24 ENCOUNTER — LAB (OUTPATIENT)
Dept: LAB | Facility: CLINIC | Age: 37
End: 2022-06-24
Payer: COMMERCIAL

## 2022-06-24 DIAGNOSIS — R63.5 WEIGHT GAIN: ICD-10-CM

## 2022-06-24 LAB
CORTIS SERPL-MCNC: 13.6 UG/DL
FSH SERPL-ACNC: 4.2 MIU/ML
PROLACTIN SERPL-MCNC: 17.2 NG/ML (ref 0–20)
T3FREE SERPL-MCNC: 2.4 PG/ML (ref 1.6–3.9)
TSH SERPL DL<=0.005 MIU/L-ACNC: 2.34 UIU/ML (ref 0.3–5)

## 2022-06-24 PROCEDURE — 84443 ASSAY THYROID STIM HORMONE: CPT

## 2022-06-24 PROCEDURE — 84481 FREE ASSAY (FT-3): CPT

## 2022-06-24 PROCEDURE — 36415 COLL VENOUS BLD VENIPUNCTURE: CPT

## 2022-06-24 PROCEDURE — 84146 ASSAY OF PROLACTIN: CPT

## 2022-06-24 PROCEDURE — 82533 TOTAL CORTISOL: CPT

## 2022-06-24 PROCEDURE — 83001 ASSAY OF GONADOTROPIN (FSH): CPT

## 2022-07-06 DIAGNOSIS — K21.9 GASTROESOPHAGEAL REFLUX DISEASE, UNSPECIFIED WHETHER ESOPHAGITIS PRESENT: ICD-10-CM

## 2022-07-06 DIAGNOSIS — Z90.3 HISTORY OF SLEEVE GASTRECTOMY: ICD-10-CM

## 2022-07-19 PROBLEM — F41.1 GAD (GENERALIZED ANXIETY DISORDER): Status: ACTIVE | Noted: 2022-02-03

## 2022-07-19 PROBLEM — R87.610 ASCUS OF CERVIX WITH NEGATIVE HIGH RISK HPV: Status: ACTIVE | Noted: 2022-02-04

## 2022-07-19 PROBLEM — K44.9 HIATAL HERNIA: Status: ACTIVE | Noted: 2022-02-03

## 2022-07-19 PROBLEM — M54.12 CERVICAL RADICULITIS: Status: ACTIVE | Noted: 2021-05-19

## 2022-07-19 PROBLEM — Z97.5 IUD (INTRAUTERINE DEVICE) IN PLACE: Status: ACTIVE | Noted: 2018-10-24

## 2022-07-19 PROBLEM — R29.898 MUSCULAR DECONDITIONING: Status: ACTIVE | Noted: 2021-05-19

## 2022-07-19 PROBLEM — K60.40 RECTAL FISTULA: Status: ACTIVE | Noted: 2019-07-02

## 2022-07-21 DIAGNOSIS — Z86.39 HISTORY OF MORBID OBESITY: ICD-10-CM

## 2022-07-21 RX ORDER — NALTREXONE HYDROCHLORIDE 50 MG/1
TABLET, FILM COATED ORAL
Qty: 90 TABLET | Refills: 1 | Status: SHIPPED | OUTPATIENT
Start: 2022-07-21 | End: 2022-10-03

## 2022-09-10 NOTE — TELEPHONE ENCOUNTER
Central Prior Authorization Team   Phone: [phone]    PA Initiation    Medication: Phentermine 37.5 mg tablets  Insurance Company: [employer] ([facility]) - Phone [phone] Fax [phone]  Pharmacy Filling the Rx: [facility] - [place], [place] - [facility] [address]  Filling Pharmacy Phone: [phone]  Filling Pharmacy Fax:    Start Date: 4/8/2022    
Prior Authorization Approval    Authorization Effective Date: 4/12/2022  Authorization Expiration Date: 10/8/2022  Medication: Phentermine 37.5 mg tablets  Approved Dose/Quantity:    Reference #:     Insurance Company: [employer] ([facility]) - Phone [phone] Fax [phone]  Expected CoPay:       CoPay Card Available:      Foundation Assistance Needed:    Which Pharmacy is filling the prescription (Not needed for infusion/clinic administered): [facility] - [place], [place] - [address]  Pharmacy Notified: Yes  Patient Notified: Yes      
Prior Authorization Retail Medication Request    Medication/Dose: Phentermine 37.5 mg tablets    Key: [id number]    Pharmacy Information (if different than what is on RX)  Name:  [facility]  Phone:  [phone]    
Unknown

## 2022-09-20 ENCOUNTER — LAB (OUTPATIENT)
Dept: LAB | Facility: CLINIC | Age: 37
End: 2022-09-20
Attending: FAMILY MEDICINE
Payer: COMMERCIAL

## 2022-09-20 DIAGNOSIS — Z20.822 CLOSE EXPOSURE TO 2019 NOVEL CORONAVIRUS: ICD-10-CM

## 2022-09-20 PROCEDURE — U0003 INFECTIOUS AGENT DETECTION BY NUCLEIC ACID (DNA OR RNA); SEVERE ACUTE RESPIRATORY SYNDROME CORONAVIRUS 2 (SARS-COV-2) (CORONAVIRUS DISEASE [COVID-19]), AMPLIFIED PROBE TECHNIQUE, MAKING USE OF HIGH THROUGHPUT TECHNOLOGIES AS DESCRIBED BY CMS-2020-01-R: HCPCS

## 2022-09-20 PROCEDURE — U0005 INFEC AGEN DETEC AMPLI PROBE: HCPCS

## 2022-09-21 LAB — SARS-COV-2 RNA RESP QL NAA+PROBE: NEGATIVE

## 2022-09-28 DIAGNOSIS — K21.9 GASTROESOPHAGEAL REFLUX DISEASE, UNSPECIFIED WHETHER ESOPHAGITIS PRESENT: ICD-10-CM

## 2022-09-28 DIAGNOSIS — Z90.3 HISTORY OF SLEEVE GASTRECTOMY: ICD-10-CM

## 2022-10-03 ENCOUNTER — OFFICE VISIT (OUTPATIENT)
Dept: SURGERY | Facility: CLINIC | Age: 37
End: 2022-10-03
Payer: COMMERCIAL

## 2022-10-03 ENCOUNTER — LAB (OUTPATIENT)
Dept: LAB | Facility: CLINIC | Age: 37
End: 2022-10-03
Payer: COMMERCIAL

## 2022-10-03 ENCOUNTER — TELEPHONE (OUTPATIENT)
Dept: SURGERY | Facility: CLINIC | Age: 37
End: 2022-10-03

## 2022-10-03 VITALS
WEIGHT: 207 LBS | BODY MASS INDEX: 30.66 KG/M2 | DIASTOLIC BLOOD PRESSURE: 84 MMHG | SYSTOLIC BLOOD PRESSURE: 120 MMHG | HEIGHT: 69 IN

## 2022-10-03 DIAGNOSIS — K91.2 POSTOPERATIVE MALABSORPTION: Primary | ICD-10-CM

## 2022-10-03 DIAGNOSIS — Z90.3 HISTORY OF SLEEVE GASTRECTOMY: ICD-10-CM

## 2022-10-03 DIAGNOSIS — E66.811 OBESITY, CLASS I, BMI 30.0-34.9 (SEE ACTUAL BMI): ICD-10-CM

## 2022-10-03 DIAGNOSIS — E53.8 LOW FOLATE: ICD-10-CM

## 2022-10-03 DIAGNOSIS — K91.2 POSTOPERATIVE MALABSORPTION: ICD-10-CM

## 2022-10-03 LAB
ALBUMIN SERPL BCG-MCNC: 4.1 G/DL (ref 3.5–5.2)
ALP SERPL-CCNC: 62 U/L (ref 35–104)
ALT SERPL W P-5'-P-CCNC: 8 U/L (ref 10–35)
ANION GAP SERPL CALCULATED.3IONS-SCNC: 6 MMOL/L (ref 7–15)
AST SERPL W P-5'-P-CCNC: 30 U/L (ref 10–35)
BILIRUB SERPL-MCNC: 0.4 MG/DL
BUN SERPL-MCNC: 16 MG/DL (ref 6–20)
CALCIUM SERPL-MCNC: 8.9 MG/DL (ref 8.6–10)
CHLORIDE SERPL-SCNC: 101 MMOL/L (ref 98–107)
CREAT SERPL-MCNC: 0.86 MG/DL (ref 0.51–0.95)
DEPRECATED HCO3 PLAS-SCNC: 27 MMOL/L (ref 22–29)
ERYTHROCYTE [DISTWIDTH] IN BLOOD BY AUTOMATED COUNT: 14.2 % (ref 10–15)
FERRITIN SERPL-MCNC: 36 NG/ML (ref 6–175)
FOLATE SERPL-MCNC: 3.3 NG/ML (ref 4.6–34.8)
GFR SERPL CREATININE-BSD FRML MDRD: 89 ML/MIN/1.73M2
GLUCOSE SERPL-MCNC: 87 MG/DL (ref 70–99)
HCT VFR BLD AUTO: 37.8 % (ref 35–47)
HGB BLD-MCNC: 12.2 G/DL (ref 11.7–15.7)
MCH RBC QN AUTO: 29.9 PG (ref 26.5–33)
MCHC RBC AUTO-ENTMCNC: 32.3 G/DL (ref 31.5–36.5)
MCV RBC AUTO: 93 FL (ref 78–100)
PLATELET # BLD AUTO: 272 10E3/UL (ref 150–450)
POTASSIUM SERPL-SCNC: 4.2 MMOL/L (ref 3.4–5.3)
PROT SERPL-MCNC: 6.6 G/DL (ref 6.4–8.3)
PTH-INTACT SERPL-MCNC: 43 PG/ML (ref 15–65)
RBC # BLD AUTO: 4.08 10E6/UL (ref 3.8–5.2)
SODIUM SERPL-SCNC: 134 MMOL/L (ref 136–145)
TSH SERPL DL<=0.005 MIU/L-ACNC: 1.65 UIU/ML (ref 0.3–4.2)
VIT B12 SERPL-MCNC: 393 PG/ML (ref 232–1245)
WBC # BLD AUTO: 6.2 10E3/UL (ref 4–11)

## 2022-10-03 PROCEDURE — 99000 SPECIMEN HANDLING OFFICE-LAB: CPT

## 2022-10-03 PROCEDURE — 82746 ASSAY OF FOLIC ACID SERUM: CPT

## 2022-10-03 PROCEDURE — 84590 ASSAY OF VITAMIN A: CPT | Mod: 90

## 2022-10-03 PROCEDURE — 82306 VITAMIN D 25 HYDROXY: CPT

## 2022-10-03 PROCEDURE — 36415 COLL VENOUS BLD VENIPUNCTURE: CPT

## 2022-10-03 PROCEDURE — 80053 COMPREHEN METABOLIC PANEL: CPT

## 2022-10-03 PROCEDURE — 99214 OFFICE O/P EST MOD 30 MIN: CPT | Performed by: EMERGENCY MEDICINE

## 2022-10-03 PROCEDURE — 84630 ASSAY OF ZINC: CPT | Mod: 90

## 2022-10-03 PROCEDURE — 85027 COMPLETE CBC AUTOMATED: CPT

## 2022-10-03 PROCEDURE — 84443 ASSAY THYROID STIM HORMONE: CPT

## 2022-10-03 PROCEDURE — 84425 ASSAY OF VITAMIN B-1: CPT | Mod: 90

## 2022-10-03 PROCEDURE — 83970 ASSAY OF PARATHORMONE: CPT

## 2022-10-03 PROCEDURE — 82607 VITAMIN B-12: CPT

## 2022-10-03 PROCEDURE — 82728 ASSAY OF FERRITIN: CPT

## 2022-10-03 RX ORDER — BUSPIRONE HYDROCHLORIDE 15 MG/1
15 TABLET ORAL 2 TIMES DAILY
COMMUNITY
Start: 2022-08-17

## 2022-10-03 RX ORDER — ONDANSETRON 4 MG/1
TABLET, FILM COATED ORAL
COMMUNITY
Start: 2022-09-10

## 2022-10-03 RX ORDER — DOXYCYCLINE 100 MG/1
CAPSULE ORAL
COMMUNITY
Start: 2022-09-30

## 2022-10-03 RX ORDER — LIRAGLUTIDE 6 MG/ML
INJECTION, SOLUTION SUBCUTANEOUS
Qty: 3 ML | Refills: 5 | Status: SHIPPED | OUTPATIENT
Start: 2022-10-03 | End: 2022-10-13

## 2022-10-03 RX ORDER — PRAZOSIN HYDROCHLORIDE 1 MG/1
CAPSULE ORAL
COMMUNITY
Start: 2022-09-07

## 2022-10-03 NOTE — TELEPHONE ENCOUNTER
Central Prior Authorization Team   Phone: [phone]      PRIOR AUTHORIZATION DENIED    Medication: liraglutide - Weight Management (SAXENDA) 18 MG/3ML pen - EPA DENIED     Denial Date: 10/3/2022    Denial Rational:       Appeal Information:

## 2022-10-03 NOTE — PROGRESS NOTES
Bariatric Follow Up Visit with a History of Previous Bariatric Surgery     Date of visit: 10/3/2022  Physician: Alfa Branch MD, MD  Primary Care Provider:  Gela Hummel   37 year old  female    Date of Surgery: 5/7/19  Initial Weight: 270 lbs  Initial BMI: 40.8  Today's Weight:   Wt Readings from Last 1 Encounters:   10/03/22 93.9 kg (207 lb)     Weight history:   Wt Readings from Last 4 Encounters:   10/03/22 93.9 kg (207 lb)   04/06/22 90.1 kg (198 lb 9.6 oz)   02/10/22 88.9 kg (196 lb)   02/02/22 88.9 kg (196 lb)      Body mass index is 31.02 kg/m .      Assessment and Plan     Assessment: Donna is a 37 year old year old female who is 3.5 years s/p  Sleeve Gastrectomy with Dr. Louis.  She's had good overall durability of weight reduction with some combined medical support of her weight related health with Saxenda previously and some naltrexone/phentermine as well. She's not noted much efficacy w/ naltrexone and her anxiety/insomnia issues didn't respond favorable to phentermine therapy. She has some interest in restart of the Saxenda that she'd stopped earlier this year and still has some left over medication that she plans to reramp, new Rx also provided today with needles. We'll check in in 3 months after she's ramped up to max tolerated dose of 3mg/day if tolerated well.  We'll check labs today.     She is planning to move to [place] this next March of 2023 and we discussed establishing Bariatric Care at a center of excellence when she moves down, resources provided.  She's had abdominoplasty and breast lift this past year w/ Dr. Gallegos and is happy with results and less neck/back pain as a result.     Her anxiety medications carry some risk for weight regain and we discussed the importance of mindful use of protein and general bariatric method as nibbling behaviors can still generate substantial weight regain in many anxious patients medicated with similar agents.    Donna  ASHLIE Bolanos feels as if she has achieved the goals she hoped to accomplish through bariatric surgery and weight loss.    Encounter Diagnoses   Name Primary?     History of sleeve gastrectomy Yes     Postoperative malabsorption          Current Outpatient Medications:      Cholecalciferol (VITAMIN D) 125 MCG (5000 UT) capsule, , Disp: , Rfl:      doxycycline monohydrate (MONODOX) 100 MG capsule, TAKE 1 CAPSULE (100 MG) BY MOUTH TWO TIMES DAILY FOR 7 DAYS., Disp: , Rfl:      ferrous fumarate (FERRETTS) 324 (106 Fe) MG TABS tablet, , Disp: , Rfl:      FLUoxetine (PROZAC) 20 MG capsule, TAKE 2 CAPSULES BY MOUTH ONCE A DAY, Disp: , Rfl:      FLUoxetine (PROZAC) 40 MG capsule, TAKE 1 CAPSULE BY MOUTH ONCE A DAY, TAKE WITH 20 MG CAPSULE (TOTAL DOSE 60 MG), Disp: , Rfl:      hydrOXYzine (ATARAX) 10 MG tablet, TAKE 2 TABLETS BY MOUTH ONCE A DAY AS NEEDED FOR ANXIETY **NEW DIRECTIONS!**, Disp: , Rfl:      hydrOXYzine (VISTARIL) 25 MG capsule, TAKE 1 CAPSULE BY MOUTH AT BEDTIME FOR SLEEP, Disp: , Rfl:      levonorgestrel (MIRENA) 20 MCG/DAY IUD, 1 each by Intrauterine route, Disp: , Rfl:      naltrexone (DEPADE/REVIA) 50 MG tablet, Half a tablet with breakfast and supper., Disp: 90 tablet, Rfl: 1     omeprazole (PRILOSEC) 20 MG DR capsule, Take 1 capsule (20 mg) by mouth daily, Disp: 120 capsule, Rfl: 0     ondansetron (ZOFRAN ODT) 4 MG ODT tab, Place 4 mg under the tongue, Disp: , Rfl:      prazosin (MINIPRESS) 1 MG capsule, TAKE 1 CAPSULE BY MOUTH NIGHTLY AS NEEDED; FOR NIGHTMARES, Disp: , Rfl:      propranolol (INDERAL) 10 MG tablet, TAKE 2 TABLETS BY MOUTH ONCE A DAY AS NEEDED FOR ANXIETY., Disp: , Rfl:      traZODone (DESYREL) 50 MG tablet, TAKE 1-2 TABLETS BY MOUTH AT BEDTIME AS NEEDED FOR SLEEP. INDICATIONS: TROUBLE SLEEPING, Disp: , Rfl:      B Complex Vitamins (VITAMIN B COMPLEX PO), Take 1 drop by mouth daily., Disp: , Rfl:      busPIRone (BUSPAR) 10 MG tablet, TAKE 1 TABLET BY MOUTH TWICE A DAY, Disp: , Rfl:       busPIRone (BUSPAR) 15 MG tablet, Take 15 mg by mouth 2 times daily, Disp: , Rfl:      calcium citrate-vitamin D (CITRACAL) 315-200 MG-UNIT TABS per tablet, Take 1 tablet by mouth (Patient not taking: Reported on 10/3/2022), Disp: , Rfl:      cyanocobalamin 1000 MCG SUBL, Place 1,000 mcg under the tongue, Disp: , Rfl:      diclofenac (VOLTAREN) 1 % topical gel, Apply topically once or twice daily if needed for neck pain., Disp: , Rfl:      gabapentin (NEURONTIN) 300 MG capsule, Take 300 mg by mouth, Disp: , Rfl:      ISENTRESS 400 MG tablet, Take 400 mg by mouth 2 times daily, Disp: , Rfl:      Multiple Vitamins-Minerals (MULTIVITAL) TABS, Take 1 tablet by mouth daily., Disp: , Rfl:      ondansetron (ZOFRAN) 4 MG tablet, TAKE 1 TABLET (4 MG) BY MOUTH EVERY 8 HOURS AS NEEDED FOR NAUSEA., Disp: , Rfl:      phentermine (ADIPEX-P) 37.5 MG tablet, Start half tablet daily after breakfast. (Patient not taking: Reported on 10/3/2022), Disp: 45 tablet, Rfl: 0    Plan:    No follow-ups on file.    Bariatric Surgery Review     Interim History/LifeChanges: had walking pneumonia this summer and lost activity, gained about 10 lbs, then lost it was at la under 200 lbs now at current levels.   Naltrexone wasn't helpful. Saxenda was slow to be tolerated, nausea was problematic. She has some still left over.  She's interested in ramping up again and given her anxiety hx and insomnia, stimulants are contraindicated for her and I'll re-write for a ramping dose/maintenance dose through the Winter.  Patient Concerns: weight gain mildly.  Appetite (1-10): OK  GERD: rare w/ on PPI..    Reviewed whether any need/indication for screening EGD today and we will defered.  Typically, a screening EGD is recommend post op year 2-3 if no symptoms to assess health of esophagus/bariatric surgery and sooner if difficult to control GERD or persistent pain/dysphagia sx despite behavior modification.    Medication changes: see list, reviewed today in  detail. Some obesogenic risks w/ her neurontin and hydroxyzine therapy.    Vitamin Intake:   B-12   yes   MVI  yes   Vitamin D  yes   Calcium   rare     Other  probiotic and iron.              LABS: ordered    Nausea some related to her anxiety, tolerated Saxenda if went very slow.  Anxiety triggers nausea.  Vomiting no  Constipation no  Diarrhea no  Rashes no  Hair Loss thick.  Skin removal nearly a year ago.  Reactive Hypoglycemia n/a  Light Headedness no   Moods : positive about moving to [place], anxiety aided by meds and therapy weekly.      Most recent labs:  Lab Results   Component Value Date    WBC 5.2 12/15/2020    HGB 14.1 12/15/2020    HCT 41.4 12/15/2020    MCV 96 12/15/2020     12/15/2020     Lab Results   Component Value Date    CHOL 184 05/15/2020     Lab Results   Component Value Date     05/15/2020     No components found for: LDLCALC  Lab Results   Component Value Date    TRIG 73 05/15/2020     No results found for: CHOLHDL  Lab Results   Component Value Date    ALT 14 07/19/2021    AST 20 07/19/2021    ALKPHOS 49 07/19/2021     No results found for: HGBA1C  Lab Results   Component Value Date    B12 467 07/19/2021     No components found for: VITDT1  Lab Results   Component Value Date    LEXX 33 02/11/2022     Lab Results   Component Value Date    PTHI 57 07/19/2021     Lab Results   Component Value Date    ZN 96.4 07/19/2021     Lab Results   Component Value Date    VIB1WB 118 07/19/2021     Lab Results   Component Value Date    TSH 2.34 06/24/2022     No results found for: TEST    Habits:  Tobacco/Nicotine/THC exposure? no   NSAID use? None.    Alcohol use? Social, no issues.   Caffeine Habits? Less coffee than usual. occ iced coffee/ protein drink.       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   3 meals/day? Yes, breakfast can be a struggle.   Protein 60-80g/day? W/ protein shake.   Water Separate from meals? yes  Calorie Containing Beverages:  "rare  Restaurant eating/wk: n/a  Sleep Habits:  8-9 hours.  CPAP Use? never  Contraception: IUD  DEXA:not due, reviewed guidance in the future given that she's moving away.  Discussed annual screening to start at age 45 and continue to age 55 if scoring \"low risk\". DEXA scan recommended at age 55 regardless as long as at least 2 years have transpired from their bariatric surgery.    Social History     Social History     Socioeconomic History     Marital status:      Spouse name: Not on file     Number of children: Not on file     Years of education: Not on file     Highest education level: Not on file   Occupational History     Not on file   Tobacco Use     Smoking status: Former Smoker     Quit date: 2010     Years since quittin.6     Smokeless tobacco: Never Used   Substance and Sexual Activity     Alcohol use: Yes     Comment: 1 time per week 2-4 per time     Drug use: No     Sexual activity: Yes     Partners: Male     Birth control/protection: Condom, I.U.D.   Other Topics Concern     Not on file   Social History Narrative     Not on file     Social Determinants of Health     Financial Resource Strain: Not on file   Food Insecurity: Not on file   Transportation Needs: Not on file   Physical Activity: Not on file   Stress: Not on file   Social Connections: Not on file   Intimate Partner Violence: Not on file   Housing Stability: Not on file       Past Medical History     Past Medical History:   Diagnosis Date     Arthritis     Neck     Gastroesophageal reflux disease      Hiatal hernia 2/3/2022     Obesity      Problem List     Patient Active Problem List   Diagnosis     Anal fistula     History of sleeve gastrectomy     Postoperative malabsorption     S/P bariatric surgery     ASCUS of cervix with negative high risk HPV     Cervical radiculitis     MACIEL (generalized anxiety disorder)     Hiatal hernia     IUD (intrauterine device) in place     Rectal fistula     Muscular deconditioning " "    Medications     [unfilled]  Surgical History     Past Surgical History  She has a past surgical history that includes Pr Lap, Celso Restrict Proc, Longitudinal Gastrectomy (N/A, 5/7/2019); Pr Surg Diagnostic Exam, Anorectal (N/A, 7/2/2019); Incision And Drainage Perirectal Abscess (N/A, 10/31/2019); Fissurectomy rectum (N/A, 12/27/2019); and Esophagoscopy, gastroscopy, duodenoscopy (EGD), combined (N/A, 1/26/2022).    Objective-Exam     Constitutional:  /84   Ht 1.74 m (5' 8.5\")   Wt 93.9 kg (207 lb)   BMI 31.02 kg/m    [unfilled]   General:  Pleasant and in no acute distress   Eyes:  EOMI  ENT:  Airway patent.    Neck:  Respiratory: Normal respiratory effort, no cough, .  CV:  RRR  Gastrointestinal: abdominoplasty incisions well healed, nodular midline scar without any palpable defects.   Musculoskeletal: muscle mass WNL  Skin: color without pallor hair thick/long,   Psychiatric: alert and oriented X3, mood and affect normal    Counseling     We reviewed the important post op bariatric recommendations:  -eating 3 meals daily  -eating protein first, getting >60gm protein daily  -eating slowly, chewing food well  -avoiding/limiting calorie containing beverages  -drinking water 15-30 minutes before or after meals  -limiting restaurant or cafeteria eating to twice a week or less    We discussed the importance of restorative sleep and stress management in maintaining a healthy weight.  We discussed the National Weight Control Registry healthy weight maintenance strategies and ways to optimize metabolism.  We discussed the importance of physical activity including cardiovascular and strength training in maintaining a healthier weight.    We discussed the importance of life-long vitamin supplementation and life-long  follow-up.    Donna was reminded that, to avoid marginal ulcers she should avoid tobacco at all, alcohol in excess, caffeine in excess, and NSAIDS (unless indicated for cardioprotection or " othewise and opposed by a PPI).    Alfa Branch MD    [facility].  10/3/2022  8:17 AM  [phone] (clinic phone)  [phone] (fax)    No images are attached to the encounter.  Medical Decision Makin minutes spent on the date of the encounter doing chart review, history and exam, documentation and further activities per the note

## 2022-10-03 NOTE — PATIENT INSTRUCTIONS
"Plan:  Great work w/ excellent weight maintenance, given some weight regain you had interest in restarting some of your Saxenda again and I've sent a starting regimen for you again to your pharmacy. Spend 3 weeks at each dose before ramping up to the next level and reduce dose if needed to tolerate. If rash/severe abdominal pain/vomiting then stop the medication.    2. Check labs today to determine efficacy of Patch and periodic iron/calcium regimen.  Oral vitamin used currently.    3. Continue finding fitness option for staying active and destressing.    4. When you move to [place], check out the \"centers of excellence\" for clinics in your area on the [email or web address] web site.         [facility]  Nutritional Guidelines  Gastric Sleeve 18 Months Post Op and Beyond    eneral Guidelines and Helpful Hints:  Eat 3 meals per day + protein supplement(s). No snacks between meals.  Do not skip meals.  This can cause overeating at the next meal and will prevent adequate protein and nutritional intake.  Aim for 60-80 grams of protein per day.  Always eat your protein first. This assists with optimal nutrition and helps you stay full longer.  Depending on your portion size, you may need to drink approved protein supplement between meals to achieve protein goals. Follow recommendations of your Dietitian.   Eat your protein first, and then follow with fiber.   It is not necessary to count your fiber, but 15-25 grams per day is recommended.    Add fiber by including fruits, vegetables, whole grains, and beans.   Portions should remain about 1 cup per meal. Use measuring cups to be accurate.  Continue to use saucer/salad plates, infant/toddler silverware to keep portion sizes small and take small bites.  Eat [id number] to make each meal last 20-30 minutes. Always stop eating when satisfied.  Continue to use caution with foods containing skins, peels or membranes. Chew well!  Aim for 64 oz. of calorie-free fluids " "daily.  Continue to avoid caffeine and carbonation. If you choose to drink alcohol, do so in moderation.   Remember to avoid drinking during meals, 15-30 minutes before and 30 minutes after.  Exercise is sterling for continued weight loss and weight maintenance. 150 minutes weekly of moderate aerobic activity or 75 minutes of vigorous with 2 days or more a week of strength training. Try to get 20% or more of your steps each day at a brisk pace, as though hurrying to a bus stop. Look to get stronger this year.  If having trouble tolerating meat, try using a crock-pot, tinfoil tent, steamer or other moist cooking method to create tender meats. Add broth or low-fat gravy to help meat stay moist.   Avoid high sugar and high fat foods to prevent dumping syndrome.  Check nutrition labels for less than 10 grams of sugar and less than 10 grams of fat per serving.  Continue Taking Vitamins/Minerals:  1000 mcg of Sublingual B-12 at least 3 days weekly to average 350-500mcg/day. If using 2500mcg lozenges, 2 weekly.  If 5000 mcg, once weekly dosing works.  1 Complete Multivitamin with 18mg Iron twice daily (chewable or swallow tabs). Often sold as \"women's one a day\" if tablet but take twice daily.  500-600 mg Calcium Citrate twice daily (chewable or swallow tabs).  5000 IU Vitamin D3 daily.  If menstruating, you may need closer to 60mg of iron daily to prevent iron deficiency. An occasional \"boost\" of extra iron supplement during/after is reasonable if heavy flow.    Sample Grocery List    Protein:  Fat free Greek or light yogurt (less than 10 grams sugar)  Fat free or low-fat cottage cheese  String cheese or reduced fat cheese slices  Tuna, salmon, crab, egg, or chicken salad made with light or fat free mayonnaise  Egg or Egg Substitute  Lean/extra lean turkey, beef, bison, venison (ground, sirloin, round, flank)  Pork loin or tenderloin (grilled, baked, broiled)  Fish such as salmon, tuna, trout, tilapia, etc. (grilled, baked, " broiled)  Tender cuts of lean (skinless) turkey or chicken  Lean deli meats: turkey, lean ham, chicken, lean roast beef  Beans such as kidney, garbanzo, black, verdugo, or low-fat/fat free refried beans  Peanut butter (natural preferred). Limit to 1 Tbsp. per day.  Low-fat meatloaf (made with lean ground beef or turkey)  Sloppy Joes made with low-sugar ketchup and lean ground beef or turkey  Soy or vegetable protein (i.e. vegan crumbles, soy/veggie burger, tofu)  Hummus    Vegetables:  Fresh: cooked or raw (as tolerated)  Frozen vegetables  Canned vegetables (low sodium or no salt added, rinse before cooking/eating)  (Ok to have skins/peels/membranes/seeds - just chew well)    Fruits:  Fresh fruit  Frozen fruit (no sugar added)  Canned fruit (packed in its own juice, NOT syrup)  (Ok to have skins/peels/membranes/seeds - just chew well)    Starch:  Unsweetened whole-grain hot cereal (or high fiber cold cereal, dry)  Toasted whole wheat bread or [place] Thins  Whole grain crackers  Baked /boiled/mashed potato/sweet potato  Cooked whole grain pasta, brown rice, or other cooked whole grains  Starchy vegetables: corn, peas, winter squash    Protein Supplement:   Ready to drink protein shake with:  15-30 grams protein per serving  Less than 10 grams total carbohydrate per serving   Protein powder mixed with:   Skim or 1% milk  Low fat or fat free Lactaid milk, plain or no sugar added soymilk  Water     Fats: (use in moderation)  1 teaspoon of soft tub margarine  1 teaspoon olive oil, canola oil, or peanut oil  1 tablespoon of low-fat ng or salad dressing     Sample Menu for 18+ months after Gastric Sleeve    You do NOT need to eat/drink the full portion sizes listed below  Always stop when you are satisfied    Breakfast   cup 1% cottage cheese     cup mixed berries   Lunch 2 oz lean roast beef on   [place] Thin with 1 tsp. light ng    small tomato, chopped, mixed with 1 tsp. light vinaigrette dressing   Supplement  Approved protein supplement (if needed between meals)   Dinner 2 oz grilled salmon    cup salad greens with 1 tsp. light salad dressing and 1 tsp. ground flax seed    cup quinoa or brown rice     Breakfast   cup egg substitute with   cup sautéed chopped vegetables  2 light [place] Krisp crackers   Lunch Tuna Melt:   cup tuna mixed with 1 tsp. light ng over   [place] Thin. Top with 2-3 slices cucumber and 1 oz slice of low fat cheese   Supplement 1 cup skim milk (if needed between meals)   Dinner 3 oz  grilled, broiled, or baked seasoned skinless chicken breast    cup asparagus     Breakfast   cup plain oatmeal made with skim or 1% milk with 1 Tbsp. flavored/unflavored protein powder added  1 mozzarella string cheese   Lunch 2 oz deli turkey breast  1/3 cup salad with 1 tsp. light salad dressing, 1/8 of a whole avocado and 1 Tbsp. sunflower seeds   Dinner 3 oz. pork loin made in a crock pot, seasoned with a spice rub    cup cooked carrots   Supplement Approved protein supplement (if needed between meals)     Breakfast 1 cup breakfast casserole made with egg substitute, turkey sausage,  and steamed, chopped bell peppers   Supplement  1 cup light Greek yogurt (if needed between meals)   Lunch 2 oz. teriyaki turkey    cup mashed sweet potato with 1-2 spritzes of spray butter (like [place])    cup fresh pineapple   Dinner 3 oz low fat meatloaf    cup roasted garlic zucchini     Breakfast   cup leftover breakfast casserole    cup no sugar added applesauce with 1 Tbsp. unflavored protein powder and a sprinkle of cinnamon    Lunch 3 oz shrimp with 1-2 Tbsp. low-sugar cocktail sauce for dipping    c. whole wheat pasta drizzled with   tsp. olive oil   Supplement 1 cup skim/1% milk with scoop of protein powder (if needed between meals)   Dinner Grilled, seasoned kebob with 2 oz lean beef and   cup vegetables     Breakfast Breakfast pizza:   [place] Thin spread with 1 Tbsp. low sugar spaghetti sauce,   cup shredded low fat  cheese, melted and 1 slice of [place] cabral     cup fresh fruit mixed with chopped almonds   Lunch   cup black bean soup  4-5 whole grain crackers   Dinner 3 oz  tilapia with lemon pepper seasoning    cup stewed tomatoes   Supplement 1 string cheese (if needed between meals)     Breakfast 2 hard boiled eggs (discard 1 egg yolk)    whole wheat English Muffin with 1 tsp. low sugar jelly   Lunch   cup leftover black bean soup topped with 1-2 Tbsp. low fat cheese  2-3 light [place] Krisp crackers   Supplement Approved protein supplement (if needed between meals)   Dinner 3 oz sirloin steak    cup steamed broccoli      LEAN PROTEIN SOURCES  Getting 20-30 grams of protein, 3 meals daily, is appropriate for most people, some need more but more than about 40 grams per meal is not useful.  General rule is drinking one ounce of water per gram of protein eaten over the course of the day:  70 grams of protein each day, drink 70 oz of water.  Protein Source Portion Calories Grams of Protein                           Nonfat, plain Greek yogurt    (10 grams sugar or less) 3/4 cup (6 oz)  12-17   Light Yogurt (10 grams sugar or less) 3/4 cup (6 oz)  6-8   Protein Shake 1 shake 110-180 15-30   Skim/1% Milk or lactose-free milk 1 cup ( 8 oz)  8   Plain or light, flavored soymilk 1 cup  7-8   Plain or light, hemp milk 1 cup 110 6   Fat Free or 1% Cottage Cheese 1/2 cup 90 15   Part skim ricotta cheese 1/2 cup 100 14   Part skim or reduced fat cheese slices 1 ounce 65-80 8     Mozzarella String Cheese 1 80 8   Canned tuna, chicken, crab or salmon  (canned in water)  1/2 cup 100 15-20   White fish (broiled, grilled, baked) 3 ounces 100 21   [place]/Tuna (broiled, grilled, baked) 3 ounces 150-180 21   Shrimp, Scallops, Lobster, Crab 3 ounces 100 21   Pork loin, Pork Tenderloin 3 ounces 150 21   Boneless, skinless chicken /turkey breast                          (broiled, grilled, baked) 3 ounces 120 21   [place], [place],  [place], and Venison 3 ounces 120 21   Lean cuts of red meat and pork (sirloin,   round, tenderloin, flank, ground 93%-96%) 3 ounces 170 21   Lean or Extra Lean Ground Turkey 1/2 cup 150 20   90-95% Lean [place] Burger 1 van 140-180 21   Low-fat casserole with lean meat 3/4 cup 200 17   Luncheon Meats                                                        (turkey, lean ham, roast beef, chicken) 3 ounces 100 21   Egg (boiled, poached, scrambled) 1 Egg 60 7   Egg Substitute 1/2 cup 70 10   Nuts (limit to 1 serving per day)  3 Tbsp. 150 7   Nut [place] (peanut, almond)  Limit to 1 serving or less daily 1 Tbsp. 90 4   Soy Burger (varies) 1  15   Garbanzo, Black, [place] Beans 1/2 cup 110 7   Refried Beans 1/2 cup 100 7   Kidney and [place]ima beans 1/2 cup 110 7   Tempeh 3 oz 175 18   Vegan crumbles 1/2 cup 100 14   Tofu 1/2 cup 110 14   Chili (beans and extra lean beef or turkey) 1 cup 200 23   Lentil Stew/Soup 1 cup 150 12   Black Bean Soup 1 cup 175 12       MEDICATIONS FOR WEIGHT LOSS  There are several medications available to assist us in weight loss.  By themselves, without compliance to a change in diet and increase in movement/activity these medications are disappointing in their results. However, combined with a closely monitored program of diet change and exercise they can be very effective in controlling appetite and boosting initial weight loss.  All weight loss medications need continual re-evaluation for efficacy as their side effects and health benefits fail to be worthwhile if a person is not continuing to lose weight or in maintaining their healthy weight.  Some weight loss medications are scheduled drugs, meaning there is at least a theoretical possibility for developing addiction to them but in practice this is rare.  We do anticipate coming off meds in the future after stabilization of weight loss is assurred.  Finally, a tolerance can develop and people s perceived efficacy of medication can  diminish.  In communication with your physician, it may be appropriate to intermittently take a break from these medications and then restart again (few weeks off then restart again) if a plateau is reached that cannot be broken through.  Each person can respond to a medication differently and to be a good option for you, it will need to be affordable, effective and well tolerated with minimal side effects.    In most cases, weight loss progress after one month and three months will be obtained and if a patient is not reaching the satisfactory progress towards weight loss, the medications may be discontinued.  The thought is that if a person is taking a weight loss medication and not receiving the potential health benefit of that drug, the side effects are not worthwhile and use should be discontinued.  On the flip side, there are many people on some weight loss medications for years because it continues to be an effective tool in their weight management and they are tolerating the medication without any long-term side effects.  Each person's response and purpose will be evaluated.      PHENTERMINE (Adipex): approved in 1959 for appetite suppression.  It has stimulant effects and cannot be used with Ritalin, Concerta, or other stimulants.  Although it is not highly addictive, it's chemically related to amphetamines which are addictive and is classified as a Controlled Substance by the [place].  Occasional dependence can develop, but rarely. The most common side effects are dry mouth, increased energy and concentration, increased pulse, and constipation.  You should not take phentermine if you have glaucoma, hyperthyroidism, or uncontrolled/untreated hypertension or overly anxious. You should stop if dramatic mood swings, severe insomnia, palpations, chest pains, visual changes or if your Blood Pressure is consistently elevated or any time it's over 160/90.   It's ok to go off the med for a few weeks and restart if  efficacy is wearing off.  $24-$30 for 90 tablets at [facility]. Females are required to have reliable birth control to reduce the risk birth defects/miscarriage.      TOPIRAMATE (Topamax): Anti-seizure medication, also used to prevent migraines and sometimes for mood stabilization.  Side effects include paresthesia, glaucoma, altered concentration, attention difficulties, memory and speech problems, metabolic acidosis, depression, increase in body temperature and decrease sweating, risk of kidney stones.  Do not take Topamax while taking Depakote as this can cause high ammonia levels.  You must have reliable birth control as Topamax can cause birth defects.  If prolonged use has occurred it should be tapered off slowly to avoid withdrawal issues.  Insurance usually covers Topiramate.  At higher doses, there may be some confusion/forgetfulness associated with this so we try to limit dose to under 75mg twice daily to reduce this risk. Often covered by insurance as it's used for many reasons.  Topamax will cause carbonated beverages to taste bad. A recheck of your kidney/electrolytes may occur within a few months of starting.    QSYMIA (Phentermine + Topamax):  See above information about phentermine and Topamax.  Most common side effects are paresthesia, dizziness, distortion of taste, insomnia, constipation, and dry mouth.  See above descriptions for the two individual agents.Females are required to have reliable birth control to reduce the risk birth defects/miscarriage.  $150-$220 per month      GLP1 Agonists:  Liraglutide (Victoza/Saxenda), Semaglutide (Ozempic/Wegovy):   Part of the family of Glucagon Like Peptide Agonists, these medications directly suppresses appetite and are often used by diabetic patients due to improvements in glucose/insulin balance.  They also slow how quickly the stomach empties, increasing fullness. They may be hard to get covered for non diabetics and some plans have exclusions for  "weight loss purposes.  Currently, these are  injectable medications delivered via autoinjector pen. It can be very costly without insurance coverage (over $500/month).  Small risks for pancreatitis exists and dose should be held if increased mid abdominal pain/burning. It is not to be used if previous Multiple Endocrine Neoplasia. In rodents, may increase risk of thyroid tumors and not indicated for anyone with a history of medullary thyroid cancer as a result.  If changes in voice/swallowing should be discontinued. Reliable birth control required in women. [email or web address], [email or web address] has more information on these medications.    Contrave (Bupropion/Naltrexone).    Synergistic combination of a mild appetite suppressing anti-depressant (Bupropion) whose effects are increased due to interaction with Naltrexone.  Naltrexone may have some effects on craving and is often used in addiction medicine to help previous opiate addicts be less prone to relapse as it blocks the action of opiates. Should be stopped if any need for opiate pain medication, surgery or planned procedures where you'll be given sedation/anesthesia. If prolonged use, recommend stepping down bupropion over 2-3 weeks to limit any risk of withdrawal issues. Side effects may include dry mouth, increased heart rate, mild elevation in Blood pressure;  dizziness, ringing in the ears, anxiety (typically due to bupropion), nausea, constipation, and some get fatigued with naltrexone.  About $210 on [facility] for 120 tabs of \"Contrave\", the brand name without insurance coverage. Generic Bupropion 75mg: $25 for 120 tabs, Naltrexone: $55 for 90 tabs without insurance coverage on [employer]. Cannot be used if pregnant/trying to conceive or breast feeding.      Plenity:   Recently available October 2020, by mail order pharmacy only, but expensive. $98/month.  2-3 capsules taken 20 minutes before 2 meals daily provides crystals that expand into a gel that provides a " "mechanical fullness. Gel mixes with your next meal and increases satisfaction by bulking the meal up to feel bigger than it truly is. Plenity is not absorbed and gets passed through the digestive tract and excreted in stools. May cause some bloating/gas/full feeling as it behaves like a fiber in many ways. Cost not available yet. FDA cleared in March of 2019 but not available in stores as of March of 2020. Clearance safety and efficacy data done under \"Gelesis\" name. Not appropriate for people with stomach or bowel motility issues as requires you to pass it through digestive tract. [email or web address] has more information.    "

## 2022-10-03 NOTE — LETTER
10/3/2022         RE: Donna Bolanos  [address]  [place] [address]        Dear Colleague,    Thank you for referring your patient, Donna Bolanos, to the [facility] [place]. Please see a copy of my visit note below.    a    Bariatric Follow Up Visit with a History of Previous Bariatric Surgery     Date of visit: 10/3/2022  Physician: Alfa Branch MD, MD  Primary Care Provider:  Gela Hummel  Donna Bolanos   37 year old  female    Date of Surgery: 5/7/19  Initial Weight: 270 lbs  Initial BMI: 40.8  Today's Weight:   Wt Readings from Last 1 Encounters:   10/03/22 93.9 kg (207 lb)     Weight history:   Wt Readings from Last 4 Encounters:   10/03/22 93.9 kg (207 lb)   04/06/22 90.1 kg (198 lb 9.6 oz)   02/10/22 88.9 kg (196 lb)   02/02/22 88.9 kg (196 lb)      Body mass index is 31.02 kg/m .      Assessment and Plan     Assessment: Donna is a 37 year old year old female who is 3.5 years s/p  Sleeve Gastrectomy with Dr. Louis.  She's had good overall durability of weight reduction with some combined medical support of her weight related health with Saxenda previously and some naltrexone/phentermine as well. She's not noted much efficacy w/ naltrexone and her anxiety/insomnia issues didn't respond favorable to phentermine therapy. She has some interest in restart of the Saxenda that she'd stopped earlier this year and still has some left over medication that she plans to reramp, new Rx also provided today with needles. We'll check in in 3 months after she's ramped up to max tolerated dose of 3mg/day if tolerated well.  We'll check labs today.     She is planning to move to [place] this next March of 2023 and we discussed establishing Bariatric Care at a center of excellence when she moves down, resources provided.  She's had abdominoplasty and breast lift this past year w/ Dr. Gallegos and is happy with results and less neck/back pain as a result.      Her anxiety medications carry some risk for weight regain and we discussed the importance of mindful use of protein and general bariatric method as nibbling behaviors can still generate substantial weight regain in many anxious patients medicated with similar agents.    Donna Bolanos feels as if she has achieved the goals she hoped to accomplish through bariatric surgery and weight loss.    Encounter Diagnoses   Name Primary?     History of sleeve gastrectomy Yes     Postoperative malabsorption          Current Outpatient Medications:      Cholecalciferol (VITAMIN D) 125 MCG (5000 UT) capsule, , Disp: , Rfl:      doxycycline monohydrate (MONODOX) 100 MG capsule, TAKE 1 CAPSULE (100 MG) BY MOUTH TWO TIMES DAILY FOR 7 DAYS., Disp: , Rfl:      ferrous fumarate (FERRETTS) 324 (106 Fe) MG TABS tablet, , Disp: , Rfl:      FLUoxetine (PROZAC) 20 MG capsule, TAKE 2 CAPSULES BY MOUTH ONCE A DAY, Disp: , Rfl:      FLUoxetine (PROZAC) 40 MG capsule, TAKE 1 CAPSULE BY MOUTH ONCE A DAY, TAKE WITH 20 MG CAPSULE (TOTAL DOSE 60 MG), Disp: , Rfl:      hydrOXYzine (ATARAX) 10 MG tablet, TAKE 2 TABLETS BY MOUTH ONCE A DAY AS NEEDED FOR ANXIETY **NEW DIRECTIONS!**, Disp: , Rfl:      hydrOXYzine (VISTARIL) 25 MG capsule, TAKE 1 CAPSULE BY MOUTH AT BEDTIME FOR SLEEP, Disp: , Rfl:      levonorgestrel (MIRENA) 20 MCG/DAY IUD, 1 each by Intrauterine route, Disp: , Rfl:      naltrexone (DEPADE/REVIA) 50 MG tablet, Half a tablet with breakfast and supper., Disp: 90 tablet, Rfl: 1     omeprazole (PRILOSEC) 20 MG DR capsule, Take 1 capsule (20 mg) by mouth daily, Disp: 120 capsule, Rfl: 0     ondansetron (ZOFRAN ODT) 4 MG ODT tab, Place 4 mg under the tongue, Disp: , Rfl:      prazosin (MINIPRESS) 1 MG capsule, TAKE 1 CAPSULE BY MOUTH NIGHTLY AS NEEDED; FOR NIGHTMARES, Disp: , Rfl:      propranolol (INDERAL) 10 MG tablet, TAKE 2 TABLETS BY MOUTH ONCE A DAY AS NEEDED FOR ANXIETY., Disp: , Rfl:      traZODone (DESYREL) 50 MG tablet, TAKE  1-2 TABLETS BY MOUTH AT BEDTIME AS NEEDED FOR SLEEP. INDICATIONS: TROUBLE SLEEPING, Disp: , Rfl:      B Complex Vitamins (VITAMIN B COMPLEX PO), Take 1 drop by mouth daily., Disp: , Rfl:      busPIRone (BUSPAR) 10 MG tablet, TAKE 1 TABLET BY MOUTH TWICE A DAY, Disp: , Rfl:      busPIRone (BUSPAR) 15 MG tablet, Take 15 mg by mouth 2 times daily, Disp: , Rfl:      calcium citrate-vitamin D (CITRACAL) 315-200 MG-UNIT TABS per tablet, Take 1 tablet by mouth (Patient not taking: Reported on 10/3/2022), Disp: , Rfl:      cyanocobalamin 1000 MCG SUBL, Place 1,000 mcg under the tongue, Disp: , Rfl:      diclofenac (VOLTAREN) 1 % topical gel, Apply topically once or twice daily if needed for neck pain., Disp: , Rfl:      gabapentin (NEURONTIN) 300 MG capsule, Take 300 mg by mouth, Disp: , Rfl:      ISENTRESS 400 MG tablet, Take 400 mg by mouth 2 times daily, Disp: , Rfl:      Multiple Vitamins-Minerals (MULTIVITAL) TABS, Take 1 tablet by mouth daily., Disp: , Rfl:      ondansetron (ZOFRAN) 4 MG tablet, TAKE 1 TABLET (4 MG) BY MOUTH EVERY 8 HOURS AS NEEDED FOR NAUSEA., Disp: , Rfl:      phentermine (ADIPEX-P) 37.5 MG tablet, Start half tablet daily after breakfast. (Patient not taking: Reported on 10/3/2022), Disp: 45 tablet, Rfl: 0    Plan:    No follow-ups on file.    Bariatric Surgery Review     Interim History/LifeChanges: had walking pneumonia this summer and lost activity, gained about 10 lbs, then lost it was at la under 200 lbs now at current levels.   Naltrexone wasn't helpful. Saxenda was slow to be tolerated, nausea was problematic. She has some still left over.  She's interested in ramping up again and given her anxiety hx and insomnia, stimulants are contraindicated for her and I'll re-write for a ramping dose/maintenance dose through the Winter.  Patient Concerns: weight gain mildly.  Appetite (1-10): OK  GERD: rare w/ on PPI..    Reviewed whether any need/indication for screening EGD today and we will  defered.  Typically, a screening EGD is recommend post op year 2-3 if no symptoms to assess health of esophagus/bariatric surgery and sooner if difficult to control GERD or persistent pain/dysphagia sx despite behavior modification.    Medication changes: see list, reviewed today in detail. Some obesogenic risks w/ her neurontin and hydroxyzine therapy.    Vitamin Intake:   B-12   yes   MVI  yes   Vitamin D  yes   Calcium   rare     Other  probiotic and iron.              LABS: ordered    Nausea some related to her anxiety, tolerated Saxenda if went very slow.  Anxiety triggers nausea.  Vomiting no  Constipation no  Diarrhea no  Rashes no  Hair Loss thick.  Skin removal nearly a year ago.  Reactive Hypoglycemia n/a  Light Headedness no   Moods : positive about moving to [place], anxiety aided by meds and therapy weekly.      Most recent labs:  Lab Results   Component Value Date    WBC 5.2 12/15/2020    HGB 14.1 12/15/2020    HCT 41.4 12/15/2020    MCV 96 12/15/2020     12/15/2020     Lab Results   Component Value Date    CHOL 184 05/15/2020     Lab Results   Component Value Date     05/15/2020     No components found for: LDLCALC  Lab Results   Component Value Date    TRIG 73 05/15/2020     No results found for: CHOLHDL  Lab Results   Component Value Date    ALT 14 07/19/2021    AST 20 07/19/2021    ALKPHOS 49 07/19/2021     No results found for: HGBA1C  Lab Results   Component Value Date    B12 467 07/19/2021     No components found for: VITDT1  Lab Results   Component Value Date    LEXX 33 02/11/2022     Lab Results   Component Value Date    PTHI 57 07/19/2021     Lab Results   Component Value Date    ZN 96.4 07/19/2021     Lab Results   Component Value Date    VIB1WB 118 07/19/2021     Lab Results   Component Value Date    TSH 2.34 06/24/2022     No results found for: TEST    Habits:  Tobacco/Nicotine/THC exposure? no   NSAID use? None.    Alcohol use? Social, no issues.   Caffeine Habits? Less  "coffee than usual. occ iced coffee/ protein drink.       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   3 meals/day? Yes, breakfast can be a struggle.   Protein 60-80g/day? W/ protein shake.   Water Separate from meals? yes  Calorie Containing Beverages: rare  Restaurant eating/wk: n/a  Sleep Habits:  8-9 hours.  CPAP Use? never  Contraception: IUD  DEXA:not due, reviewed guidance in the future given that she's moving away.  Discussed annual screening to start at age 45 and continue to age 55 if scoring \"low risk\". DEXA scan recommended at age 55 regardless as long as at least 2 years have transpired from their bariatric surgery.    Social History     Social History     Socioeconomic History     Marital status:      Spouse name: Not on file     Number of children: Not on file     Years of education: Not on file     Highest education level: Not on file   Occupational History     Not on file   Tobacco Use     Smoking status: Former Smoker     Quit date: 2010     Years since quittin.6     Smokeless tobacco: Never Used   Substance and Sexual Activity     Alcohol use: Yes     Comment: 1 time per week 2-4 per time     Drug use: No     Sexual activity: Yes     Partners: Male     Birth control/protection: Condom, I.U.D.   Other Topics Concern     Not on file   Social History Narrative     Not on file     Social Determinants of Health     Financial Resource Strain: Not on file   Food Insecurity: Not on file   Transportation Needs: Not on file   Physical Activity: Not on file   Stress: Not on file   Social Connections: Not on file   Intimate Partner Violence: Not on file   Housing Stability: Not on file       Past Medical History     Past Medical History:   Diagnosis Date     Arthritis     Neck     Gastroesophageal reflux disease      Hiatal hernia 2/3/2022     Obesity      Problem List     Patient Active Problem List   Diagnosis     Anal fistula     History of sleeve gastrectomy     " "Postoperative malabsorption     S/P bariatric surgery     ASCUS of cervix with negative high risk HPV     Cervical radiculitis     MACIEL (generalized anxiety disorder)     Hiatal hernia     IUD (intrauterine device) in place     Rectal fistula     Muscular deconditioning     Medications     [unfilled]  Surgical History     Past Surgical History  She has a past surgical history that includes Pr Lap, Celso Restrict Proc, Longitudinal Gastrectomy (N/A, 5/7/2019); Pr Surg Diagnostic Exam, Anorectal (N/A, 7/2/2019); Incision And Drainage Perirectal Abscess (N/A, 10/31/2019); Fissurectomy rectum (N/A, 12/27/2019); and Esophagoscopy, gastroscopy, duodenoscopy (EGD), combined (N/A, 1/26/2022).    Objective-Exam     Constitutional:  /84   Ht 1.74 m (5' 8.5\")   Wt 93.9 kg (207 lb)   BMI 31.02 kg/m    [unfilled]   General:  Pleasant and in no acute distress   Eyes:  EOMI  ENT:  Airway patent.    Neck:  Respiratory: Normal respiratory effort, no cough, .  CV:  RRR  Gastrointestinal: abdominoplasty incisions well healed, nodular midline scar without any palpable defects.   Musculoskeletal: muscle mass WNL  Skin: color without pallor hair thick/long,   Psychiatric: alert and oriented X3, mood and affect normal    Counseling     We reviewed the important post op bariatric recommendations:  -eating 3 meals daily  -eating protein first, getting >60gm protein daily  -eating slowly, chewing food well  -avoiding/limiting calorie containing beverages  -drinking water 15-30 minutes before or after meals  -limiting restaurant or cafeteria eating to twice a week or less    We discussed the importance of restorative sleep and stress management in maintaining a healthy weight.  We discussed the National Weight Control Registry healthy weight maintenance strategies and ways to optimize metabolism.  We discussed the importance of physical activity including cardiovascular and strength training in maintaining a healthier " weight.    We discussed the importance of life-long vitamin supplementation and life-long  follow-up.    Donna was reminded that, to avoid marginal ulcers she should avoid tobacco at all, alcohol in excess, caffeine in excess, and NSAIDS (unless indicated for cardioprotection or othewise and opposed by a PPI).    Alfa Branch MD    [facility].  10/3/2022  8:17 AM  [phone] (clinic phone)  [phone] (fax)    No images are attached to the encounter.  Medical Decision Makin minutes spent on the date of the encounter doing chart review, history and exam, documentation and further activities per the note      Again, thank you for allowing me to participate in the care of your patient.        Sincerely,        Alfa Branch MD

## 2022-10-04 LAB — DEPRECATED CALCIDIOL+CALCIFEROL SERPL-MC: 86 UG/L (ref 20–75)

## 2022-10-06 LAB
ANNOTATION COMMENT IMP: NORMAL
RETINYL PALMITATE SERPL-MCNC: 0.04 MG/L
VIT A SERPL-MCNC: 0.63 MG/L
ZINC SERPL-MCNC: 79.1 UG/DL

## 2022-10-10 LAB — VIT B1 PYROPHOSHATE BLD-SCNC: 121 NMOL/L

## 2022-10-12 RX ORDER — LANOLIN ALCOHOL/MO/W.PET/CERES
800 CREAM (GRAM) TOPICAL DAILY
Qty: 20 TABLET | Refills: 0 | Status: SHIPPED | OUTPATIENT
Start: 2022-10-12 | End: 2022-10-22

## 2022-12-22 DIAGNOSIS — Z90.3 HISTORY OF SLEEVE GASTRECTOMY: ICD-10-CM

## 2022-12-22 DIAGNOSIS — K21.9 GASTROESOPHAGEAL REFLUX DISEASE, UNSPECIFIED WHETHER ESOPHAGITIS PRESENT: ICD-10-CM

## 2022-12-26 ENCOUNTER — HEALTH MAINTENANCE LETTER (OUTPATIENT)
Age: 37
End: 2022-12-26

## 2023-01-04 ENCOUNTER — VIRTUAL VISIT (OUTPATIENT)
Dept: SURGERY | Facility: CLINIC | Age: 38
End: 2023-01-04
Payer: COMMERCIAL

## 2023-01-04 VITALS — HEIGHT: 69 IN | BODY MASS INDEX: 29.56 KG/M2 | WEIGHT: 199.6 LBS

## 2023-01-04 DIAGNOSIS — M25.562 ARTHRALGIA OF BOTH KNEES: ICD-10-CM

## 2023-01-04 DIAGNOSIS — Z86.39 HISTORY OF MORBID OBESITY: ICD-10-CM

## 2023-01-04 DIAGNOSIS — Z90.3 HISTORY OF SLEEVE GASTRECTOMY: ICD-10-CM

## 2023-01-04 DIAGNOSIS — M25.561 ARTHRALGIA OF BOTH KNEES: ICD-10-CM

## 2023-01-04 DIAGNOSIS — R63.5 WEIGHT GAIN DUE TO MEDICATION: Primary | ICD-10-CM

## 2023-01-04 DIAGNOSIS — K91.2 POSTOPERATIVE MALABSORPTION: ICD-10-CM

## 2023-01-04 DIAGNOSIS — T50.905A WEIGHT GAIN DUE TO MEDICATION: Primary | ICD-10-CM

## 2023-01-04 PROCEDURE — 99214 OFFICE O/P EST MOD 30 MIN: CPT | Mod: GT | Performed by: EMERGENCY MEDICINE

## 2023-01-04 RX ORDER — TRAZODONE HYDROCHLORIDE 100 MG/1
100 TABLET ORAL AT BEDTIME
COMMUNITY
Start: 2022-12-13

## 2023-01-04 RX ORDER — FOLIC ACID 0.8 MG
TABLET ORAL
COMMUNITY
Start: 2022-10-12

## 2023-01-04 RX ORDER — PRAZOSIN HYDROCHLORIDE 2 MG/1
CAPSULE ORAL
COMMUNITY
Start: 2022-12-05

## 2023-01-04 RX ORDER — LIRAGLUTIDE 6 MG/ML
INJECTION, SOLUTION SUBCUTANEOUS
Qty: 15 ML | Refills: 6 | Status: SHIPPED | OUTPATIENT
Start: 2023-01-04 | End: 2023-01-05 | Stop reason: ALTCHOICE

## 2023-01-04 NOTE — PROGRESS NOTES
Donna Bolanos is 37 year old  female who presents for a billable video visit today.    How would you like to obtain your AVS? MyChart  If dropped from the video visit, the video invitation should be resent by: Text to cell phone: [phone]  Will anyone else be joining your video visit? No      Video Start Time: 3:00pm    Are there any specific questions or needs that you would like addressed at your visit today? Return MWM - med check    Provider Notes: see my note      Video-Visit Details    Type of service:  Video Visit    Platform used for Video Visit: "[employer]    Video End Time (time video stopped): 3:31 PM    Originating Location (pt. Location): Home        Distant Location (provider location):  Off-site    Distant Location (provider location):  [facility]

## 2023-01-04 NOTE — PROGRESS NOTES
Bariatric Follow Up Visit with a History of Previous Bariatric Surgery     Date of visit: 1/3/2023  Physician: Alfa Branch MD, MD  Primary Care Provider:  Gela Hummel   37 year old  female    Date of Surgery: 5/7/19  Initial Weight: 270 lbs  Initial BMI: 40.8  Today's Weight:   Wt Readings from Last 1 Encounters:   01/04/23 90.5 kg (199 lb 9.6 oz)     Weight history:   Wt Readings from Last 4 Encounters:   01/04/23 90.5 kg (199 lb 9.6 oz)   10/03/22 93.9 kg (207 lb)   04/06/22 90.1 kg (198 lb 9.6 oz)   02/10/22 88.9 kg (196 lb)      Body mass index is 29.91 kg/m .      Assessment and Plan     Assessment: Donna is a 37 year old year old female who is 3.5 years s/p  Sleeve Gastrectomy with Dr. Louis.  She had started medication support one year after surgery with phentermine and then Saxenda started December of 2021 but did not achieve enough weight loss over 6 months so this was discontinued in May of 2022 (under 5% reduction) and naltrexone/phentermine restarted in April of 2022.  Her anxiety did not respond well to phentermine therapy and naltrexone was not helpful for her. She was to re-initiate Saxenda therapy per her preference following our 10/03/22 visit and since she has reported 8 lb reduction, BMI is now under 30 and she is feeling good on the 1.2mg/day dose and in need of refill, which was provided today. .    She was planning to move to [place] in March of 2023 and these plans are still in place. [email or web address] web site can be accessed for centers of excellence in her area should she move.  Donnacassie Bolanos feels as if she has achieved the goals she hoped to accomplish through bariatric surgery and weight loss.    Encounter Diagnoses   Name Primary?     Weight gain due to medication Yes     History of morbid obesity      BMI 29.0-29.9,adult      Arthralgia of both knees          Current Outpatient Medications:      liraglutide - Weight Management (SAXENDA) 18 MG/3ML pen, Up  to a maximum of 3mg/day if tolerated. Titrate up every 2-3 weeks if tolerating well., Disp: 15 mL, Rfl: 6     B Complex Vitamins (VITAMIN B COMPLEX PO), Take 1 drop by mouth daily., Disp: , Rfl:      busPIRone (BUSPAR) 10 MG tablet, TAKE 1 TABLET BY MOUTH TWICE A DAY, Disp: , Rfl:      busPIRone (BUSPAR) 15 MG tablet, Take 15 mg by mouth 2 times daily, Disp: , Rfl:      calcium citrate-vitamin D (CITRACAL) 315-200 MG-UNIT TABS per tablet, Take 1 tablet by mouth (Patient not taking: Reported on 10/3/2022), Disp: , Rfl:      Cholecalciferol (VITAMIN D) 125 MCG (5000 UT) capsule, , Disp: , Rfl:      cyanocobalamin 1000 MCG SUBL, Place 1,000 mcg under the tongue, Disp: , Rfl:      diclofenac (VOLTAREN) 1 % topical gel, Apply topically once or twice daily if needed for neck pain., Disp: , Rfl:      doxycycline monohydrate (MONODOX) 100 MG capsule, TAKE 1 CAPSULE (100 MG) BY MOUTH TWO TIMES DAILY FOR 7 DAYS., Disp: , Rfl:      ferrous fumarate (FERRETTS) 324 (106 Fe) MG TABS tablet, , Disp: , Rfl:      FLUoxetine (PROZAC) 20 MG capsule, TAKE 2 CAPSULES BY MOUTH ONCE A DAY, Disp: , Rfl:      FLUoxetine (PROZAC) 40 MG capsule, TAKE 1 CAPSULE BY MOUTH ONCE A DAY, TAKE WITH 20 MG CAPSULE (TOTAL DOSE 60 MG), Disp: , Rfl:      folic acid 800 MCG tablet, TAKE 2 TABLETS (800 MCG) BY MOUTH DAILY FOR 10 DAYS, Disp: , Rfl:      gabapentin (NEURONTIN) 300 MG capsule, Take 300 mg by mouth, Disp: , Rfl:      hydrOXYzine (ATARAX) 10 MG tablet, TAKE 2 TABLETS BY MOUTH ONCE A DAY AS NEEDED FOR ANXIETY **NEW DIRECTIONS!**, Disp: , Rfl:      hydrOXYzine (VISTARIL) 25 MG capsule, TAKE 1 CAPSULE BY MOUTH AT BEDTIME FOR SLEEP, Disp: , Rfl:      insulin pen needle (31G X 6 MM) 31G X 6 MM miscellaneous, Use new pen needle for each autoinjection., Disp: 90 each, Rfl: 1     levonorgestrel (MIRENA) 20 MCG/DAY IUD, 1 each by Intrauterine route, Disp: , Rfl:      Multiple Vitamins-Minerals (MULTIVITAL) TABS, Take 1 tablet by mouth daily., Disp: ,  Rfl:      omeprazole (PRILOSEC) 20 MG DR capsule, Take 1 capsule (20 mg) by mouth daily, Disp: 120 capsule, Rfl: 0     ondansetron (ZOFRAN ODT) 4 MG ODT tab, Place 4 mg under the tongue, Disp: , Rfl:      ondansetron (ZOFRAN) 4 MG tablet, TAKE 1 TABLET (4 MG) BY MOUTH EVERY 8 HOURS AS NEEDED FOR NAUSEA., Disp: , Rfl:      prazosin (MINIPRESS) 1 MG capsule, TAKE 1 CAPSULE BY MOUTH NIGHTLY AS NEEDED; FOR NIGHTMARES, Disp: , Rfl:      prazosin (MINIPRESS) 2 MG capsule, TAKE 1 CAPSULE BY MOUTH NIGHTLY, Disp: , Rfl:      propranolol (INDERAL) 10 MG tablet, TAKE 2 TABLETS BY MOUTH ONCE A DAY AS NEEDED FOR ANXIETY., Disp: , Rfl:      traZODone (DESYREL) 100 MG tablet, Take 100 mg by mouth At Bedtime, Disp: , Rfl:      traZODone (DESYREL) 50 MG tablet, TAKE 1-2 TABLETS BY MOUTH AT BEDTIME AS NEEDED FOR SLEEP. INDICATIONS: TROUBLE SLEEPING, Disp: , Rfl:     Plan:    No follow-ups on file.    Bariatric Surgery Review     Interim History/LifeChanges: increased yoga daily since last visit. Has been up to 1.2mg/Saxenda with some nausea. Tolerating 0.6mg/day dose. Occasionally skipped days but picks up well. Finds it very helpful for appetite control and has continued to lose weight since restarting medication about 6 weeks ago and is now up to the 1.2mg/week dose and doing well. BMI now under 30 for the first time and she's maintaining a 110lb reduction from her maximum weight ever (309 lbs) and 71 lbs from her introductory weight in our bariatric program in 2019 (270 lbs). The combination of her mindful, lower calorie/high protein bariatric diet and Saxenda has been much more useful this winter to offset the obesogenic nature of her anti-anxiety medications and has now lost enough weight to put her obesity diagnosis into remission (BMI 29.9 today).  She continues to have indication for further weight reduction given her neck and knee arthritis issues and we'll continue Saxenda therapy until she moves to [facility]  [place]. She will consult the [email or web address] web site for centers of excellence to continue her dual weight management support with her bariatric surgery and medication needs being cared for by new clinic once she's  in [place] as I am not licensed in that state.    Patient Concerns: recheck of medications/  Appetite (1-10): better with saxenda.  GERD: no.    Reviewed whether any need/indication for screening EGD today and we will deferred. .  Typically, a screening EGD is recommend post op year 2-3 if no symptoms to assess health of esophagus/bariatric surgery and sooner if difficult to control GERD or persistent pain/dysphagia sx despite behavior modification.    Medication changes: restarted Saxenda this late fall and down 4% body weight over the last 2 months.    Vitamin Intake:   B-12   yes   MVI  women's one a day (no iron)   Vitamin D  yes   Calcium   not needed.      Other  iron every other day that doesn't upset her stomach. Extra folate a few days weekly.              LABS: partially reviewed    Nausea mildly during dose adjustments of Saxenda.      Most recent labs:  Lab Results   Component Value Date    WBC 6.2 10/03/2022    HGB 12.2 10/03/2022    HCT 37.8 10/03/2022    MCV 93 10/03/2022     10/03/2022     Lab Results   Component Value Date    CHOL 184 05/15/2020     Lab Results   Component Value Date     05/15/2020     No components found for: LDLCALC  Lab Results   Component Value Date    TRIG 73 05/15/2020     No results found for: CHOLHDL  Lab Results   Component Value Date    ALT 8 (L) 10/03/2022    AST 30 10/03/2022    ALKPHOS 62 10/03/2022     No results found for: HGBA1C  Lab Results   Component Value Date    B12 393 10/03/2022     No components found for: VITDT1  Lab Results   Component Value Date    LEXX 36 10/03/2022     Lab Results   Component Value Date    PTHI 43 10/03/2022     Lab Results   Component Value Date    ZN 79.1 10/03/2022     Lab Results   Component Value Date    VIB1WB  121 10/03/2022     Lab Results   Component Value Date    TSH 1.65 10/03/2022     No results found for: TEST    Habits:  Currently packing up/showing [place].         Social History     Social History     Socioeconomic History     Marital status:      Spouse name: Not on file     Number of children: Not on file     Years of education: Not on file     Highest education level: Not on file   Occupational History     Not on file   Tobacco Use     Smoking status: Former     Types: Cigarettes     Quit date: 2010     Years since quittin.9     Smokeless tobacco: Never   Substance and Sexual Activity     Alcohol use: Yes     Comment: 1 time per week 2-4 per time     Drug use: No     Sexual activity: Yes     Partners: Male     Birth control/protection: Condom, I.U.D.   Other Topics Concern     Not on file   Social History Narrative     Not on file     Social Determinants of Health     Financial Resource Strain: Not on file   Food Insecurity: Not on file   Transportation Needs: Not on file   Physical Activity: Not on file   Stress: Not on file   Social Connections: Not on file   Intimate Partner Violence: Not on file   Housing Stability: Not on file       Past Medical History     Past Medical History:   Diagnosis Date     Arthritis     Neck     Gastroesophageal reflux disease      Hiatal hernia 2/3/2022     Obesity      Past Surgical History:   Procedure Laterality Date     ESOPHAGOSCOPY, GASTROSCOPY, DUODENOSCOPY (EGD), COMBINED N/A 2022    Procedure: ESOPHAGOGASTRODUODENOSCOPY;  Surgeon: Austyn Louis MD;  Location: [facility]     FISSURECTOMY RECTUM N/A 2019    Procedure: EXAM UNDER ANESTHESIA, FISTULOTOMY;  Surgeon: Kiki Everett MD;  Location: [facility];  Service: General     INCISION AND DRAINAGE PERIRECTAL ABSCESS N/A 10/31/2019    Procedure: EXAMINE UNDER ANESTHESIA, INCISION AND DRAINAGE PERIRECTAL ABSCESS;  Surgeon: Kiki Everett MD;   "Location: [facility];  Service: General     NC LAP, CELSO RESTRICT PROC, LONGITUDINAL GASTRECTOMY N/A 5/7/2019    Procedure: GASTRECTOMY, SLEEVE, LAPAROSCOPIC;  Surgeon: Austyn Louis MD;  Location: [facility];  Service: General     NC SURG DIAGNOSTIC EXAM, ANORECTAL N/A 7/2/2019    Procedure: RECTAL EXAM UNDER ANESTHESIA, Seton placement, anal fistula debridment,;  Surgeon: Amado Shepherd MD;  Location: [facility];  Service: General       Problem List     Patient Active Problem List   Diagnosis     Anal fistula     History of sleeve gastrectomy     Postoperative malabsorption     S/P bariatric surgery     ASCUS of cervix with negative high risk HPV     Cervical radiculitis     MACIEL (generalized anxiety disorder)     Hiatal hernia     IUD (intrauterine device) in place     Rectal fistula     Muscular deconditioning     Medications     [unfilled]  Surgical History     Past Surgical History  She has a past surgical history that includes Pr Lap, Celso Restrict Proc, Longitudinal Gastrectomy (N/A, 5/7/2019); Pr Surg Diagnostic Exam, Anorectal (N/A, 7/2/2019); Incision And Drainage Perirectal Abscess (N/A, 10/31/2019); Fissurectomy rectum (N/A, 12/27/2019); and Esophagoscopy, gastroscopy, duodenoscopy (EGD), combined (N/A, 1/26/2022).    Objective-Exam     Constitutional:  Ht 1.74 m (5' 8.5\")   Wt 90.5 kg (199 lb 9.6 oz)   BMI 29.91 kg/m    [unfilled]   General:  Pleasant and in no acute distress   Eyes:  EOMI  ENT:  Airway patent    Neck:  Respiratory: Normal respiratory effort, no cough..  CV:  n/a  Gastrointestinal: n/a  Musculoskeletal: muscle mass WNL  Skin: color without pallor hair straight/full.,   Psychiatric: alert and oriented X3, mood and affect normal    Counseling     We reviewed the important post op bariatric recommendations:  -eating 3 meals daily  -eating protein first, getting >60gm protein daily  -eating slowly, chewing food well  -avoiding/limiting calorie " containing beverages  -drinking water 15-30 minutes before or after meals  -limiting restaurant or cafeteria eating to twice a week or less    We discussed the importance of restorative sleep and stress management in maintaining a healthy weight.  We discussed the National Weight Control Registry healthy weight maintenance strategies and ways to optimize metabolism.  We discussed the importance of physical activity including cardiovascular and strength training in maintaining a healthier weight.    We discussed the importance of life-long vitamin supplementation and life-long  follow-up.    Donna was reminded that, to avoid marginal ulcers she should avoid tobacco at all, alcohol in excess, caffeine in excess, and NSAIDS (unless indicated for cardioprotection or othewise and opposed by a PPI).    Alfa Branch MD    [facility].  1/3/2023  9:03 PM  [phone] (clinic phone)  [phone] (fax)    No images are attached to the encounter.  Medical Decision Makin minutes spent on the date of the encounter doing chart review, history and exam, documentation and further activities per the note

## 2023-01-04 NOTE — LETTER
1/4/2023         RE: Donna Bolanos  [address]  [place] [address]        Dear Colleague,    Thank you for referring your patient, Donna Bolanos, to the [facility]. Please see a copy of my visit note below.    Bariatric Follow Up Visit with a History of Previous Bariatric Surgery     Date of visit: 1/3/2023  Physician: Alfa Branch MD, MD  Primary Care Provider:  Gela Hummel  Donna Bolanos   37 year old  female    Date of Surgery: 5/7/19  Initial Weight: 270 lbs  Initial BMI: 40.8  Today's Weight:   Wt Readings from Last 1 Encounters:   01/04/23 90.5 kg (199 lb 9.6 oz)     Weight history:   Wt Readings from Last 4 Encounters:   01/04/23 90.5 kg (199 lb 9.6 oz)   10/03/22 93.9 kg (207 lb)   04/06/22 90.1 kg (198 lb 9.6 oz)   02/10/22 88.9 kg (196 lb)      Body mass index is 29.91 kg/m .      Assessment and Plan     Assessment: Donna is a 37 year old year old female who is 3.5 years s/p  Sleeve Gastrectomy with Dr. Louis.  She had started medication support one year after surgery with phentermine and then Saxenda started December of 2021 but did not achieve enough weight loss over 6 months so this was discontinued in May of 2022 (under 5% reduction) and naltrexone/phentermine restarted in April of 2022.  Her anxiety did not respond well to phentermine therapy and naltrexone was not helpful for her. She was to re-initiate Saxenda therapy per her preference following our 10/03/22 visit and since she has reported 8 lb reduction, BMI is now under 30 and she is feeling good on the 1.2mg/day dose and in need of refill, which was provided today. .    She was planning to move to [place] in March of 2023 and these plans are still in place. [email or web address] web site can be accessed for centers of excellence in her area should she move.  Donna Bolanos feels as if she has achieved the goals she hoped to accomplish through bariatric surgery and weight  loss.    Encounter Diagnoses   Name Primary?     Weight gain due to medication Yes     History of morbid obesity      BMI 29.0-29.9,adult      Arthralgia of both knees          Current Outpatient Medications:      liraglutide - Weight Management (SAXENDA) 18 MG/3ML pen, Up to a maximum of 3mg/day if tolerated. Titrate up every 2-3 weeks if tolerating well., Disp: 15 mL, Rfl: 6     B Complex Vitamins (VITAMIN B COMPLEX PO), Take 1 drop by mouth daily., Disp: , Rfl:      busPIRone (BUSPAR) 10 MG tablet, TAKE 1 TABLET BY MOUTH TWICE A DAY, Disp: , Rfl:      busPIRone (BUSPAR) 15 MG tablet, Take 15 mg by mouth 2 times daily, Disp: , Rfl:      calcium citrate-vitamin D (CITRACAL) 315-200 MG-UNIT TABS per tablet, Take 1 tablet by mouth (Patient not taking: Reported on 10/3/2022), Disp: , Rfl:      Cholecalciferol (VITAMIN D) 125 MCG (5000 UT) capsule, , Disp: , Rfl:      cyanocobalamin 1000 MCG SUBL, Place 1,000 mcg under the tongue, Disp: , Rfl:      diclofenac (VOLTAREN) 1 % topical gel, Apply topically once or twice daily if needed for neck pain., Disp: , Rfl:      doxycycline monohydrate (MONODOX) 100 MG capsule, TAKE 1 CAPSULE (100 MG) BY MOUTH TWO TIMES DAILY FOR 7 DAYS., Disp: , Rfl:      ferrous fumarate (FERRETTS) 324 (106 Fe) MG TABS tablet, , Disp: , Rfl:      FLUoxetine (PROZAC) 20 MG capsule, TAKE 2 CAPSULES BY MOUTH ONCE A DAY, Disp: , Rfl:      FLUoxetine (PROZAC) 40 MG capsule, TAKE 1 CAPSULE BY MOUTH ONCE A DAY, TAKE WITH 20 MG CAPSULE (TOTAL DOSE 60 MG), Disp: , Rfl:      folic acid 800 MCG tablet, TAKE 2 TABLETS (800 MCG) BY MOUTH DAILY FOR 10 DAYS, Disp: , Rfl:      gabapentin (NEURONTIN) 300 MG capsule, Take 300 mg by mouth, Disp: , Rfl:      hydrOXYzine (ATARAX) 10 MG tablet, TAKE 2 TABLETS BY MOUTH ONCE A DAY AS NEEDED FOR ANXIETY **NEW DIRECTIONS!**, Disp: , Rfl:      hydrOXYzine (VISTARIL) 25 MG capsule, TAKE 1 CAPSULE BY MOUTH AT BEDTIME FOR SLEEP, Disp: , Rfl:      insulin pen needle (31G X 6  MM) 31G X 6 MM miscellaneous, Use new pen needle for each autoinjection., Disp: 90 each, Rfl: 1     levonorgestrel (MIRENA) 20 MCG/DAY IUD, 1 each by Intrauterine route, Disp: , Rfl:      Multiple Vitamins-Minerals (MULTIVITAL) TABS, Take 1 tablet by mouth daily., Disp: , Rfl:      omeprazole (PRILOSEC) 20 MG DR capsule, Take 1 capsule (20 mg) by mouth daily, Disp: 120 capsule, Rfl: 0     ondansetron (ZOFRAN ODT) 4 MG ODT tab, Place 4 mg under the tongue, Disp: , Rfl:      ondansetron (ZOFRAN) 4 MG tablet, TAKE 1 TABLET (4 MG) BY MOUTH EVERY 8 HOURS AS NEEDED FOR NAUSEA., Disp: , Rfl:      prazosin (MINIPRESS) 1 MG capsule, TAKE 1 CAPSULE BY MOUTH NIGHTLY AS NEEDED; FOR NIGHTMARES, Disp: , Rfl:      prazosin (MINIPRESS) 2 MG capsule, TAKE 1 CAPSULE BY MOUTH NIGHTLY, Disp: , Rfl:      propranolol (INDERAL) 10 MG tablet, TAKE 2 TABLETS BY MOUTH ONCE A DAY AS NEEDED FOR ANXIETY., Disp: , Rfl:      traZODone (DESYREL) 100 MG tablet, Take 100 mg by mouth At Bedtime, Disp: , Rfl:      traZODone (DESYREL) 50 MG tablet, TAKE 1-2 TABLETS BY MOUTH AT BEDTIME AS NEEDED FOR SLEEP. INDICATIONS: TROUBLE SLEEPING, Disp: , Rfl:     Plan:    No follow-ups on file.    Bariatric Surgery Review     Interim History/LifeChanges: increased yoga daily since last visit. Has been up to 1.2mg/Saxenda with some nausea. Tolerating 0.6mg/day dose. Occasionally skipped days but picks up well. Finds it very helpful for appetite control and has continued to lose weight since restarting medication about 6 weeks ago and is now up to the 1.2mg/week dose and doing well. BMI now under 30 for the first time and she's maintaining a 110lb reduction from her maximum weight ever (309 lbs) and 71 lbs from her introductory weight in our bariatric program in 2019 (270 lbs). The combination of her mindful, lower calorie/high protein bariatric diet and Saxenda has been much more useful this winter to offset the obesogenic nature of her anti-anxiety medications  and has now lost enough weight to put her obesity diagnosis into remission (BMI 29.9 today).  She continues to have indication for further weight reduction given her neck and knee arthritis issues and we'll continue Saxenda therapy until she moves to [place], [place] this Spring. She will consult the [email or web address] web site for centers of excellence to continue her dual weight management support with her bariatric surgery and medication needs being cared for by new clinic once she's  in [place] as I am not licensed in that state.    Patient Concerns: recheck of medications/  Appetite (1-10): better with saxenda.  GERD: no.    Reviewed whether any need/indication for screening EGD today and we will deferred. .  Typically, a screening EGD is recommend post op year 2-3 if no symptoms to assess health of esophagus/bariatric surgery and sooner if difficult to control GERD or persistent pain/dysphagia sx despite behavior modification.    Medication changes: restarted Saxenda this late fall and down 4% body weight over the last 2 months.    Vitamin Intake:   B-12   yes   MVI  women's one a day (no iron)   Vitamin D  yes   Calcium   not needed.      Other  iron every other day that doesn't upset her stomach. Extra folate a few days weekly.              LABS: partially reviewed    Nausea mildly during dose adjustments of Saxenda.      Most recent labs:  Lab Results   Component Value Date    WBC 6.2 10/03/2022    HGB 12.2 10/03/2022    HCT 37.8 10/03/2022    MCV 93 10/03/2022     10/03/2022     Lab Results   Component Value Date    CHOL 184 05/15/2020     Lab Results   Component Value Date     05/15/2020     No components found for: LDLCALC  Lab Results   Component Value Date    TRIG 73 05/15/2020     No results found for: CHOLHDL  Lab Results   Component Value Date    ALT 8 (L) 10/03/2022    AST 30 10/03/2022    ALKPHOS 62 10/03/2022     No results found for: HGBA1C  Lab Results   Component Value Date    B12 393  10/03/2022     No components found for: VITDT1  Lab Results   Component Value Date    LEXX 36 10/03/2022     Lab Results   Component Value Date    PTHI 43 10/03/2022     Lab Results   Component Value Date    ZN 79.1 10/03/2022     Lab Results   Component Value Date    VIB1WB 121 10/03/2022     Lab Results   Component Value Date    TSH 1.65 10/03/2022     No results found for: TEST    Habits:  Currently packing up/showing [place].         Social History     Social History     Socioeconomic History     Marital status:      Spouse name: Not on file     Number of children: Not on file     Years of education: Not on file     Highest education level: Not on file   Occupational History     Not on file   Tobacco Use     Smoking status: Former     Types: Cigarettes     Quit date: 2010     Years since quittin.9     Smokeless tobacco: Never   Substance and Sexual Activity     Alcohol use: Yes     Comment: 1 time per week 2-4 per time     Drug use: No     Sexual activity: Yes     Partners: Male     Birth control/protection: Condom, I.U.D.   Other Topics Concern     Not on file   Social History Narrative     Not on file     Social Determinants of Health     Financial Resource Strain: Not on file   Food Insecurity: Not on file   Transportation Needs: Not on file   Physical Activity: Not on file   Stress: Not on file   Social Connections: Not on file   Intimate Partner Violence: Not on file   Housing Stability: Not on file       Past Medical History     Past Medical History:   Diagnosis Date     Arthritis     Neck     Gastroesophageal reflux disease      Hiatal hernia 2/3/2022     Obesity      Past Surgical History:   Procedure Laterality Date     ESOPHAGOSCOPY, GASTROSCOPY, DUODENOSCOPY (EGD), COMBINED N/A 2022    Procedure: ESOPHAGOGASTRODUODENOSCOPY;  Surgeon: Austyn Louis MD;  Location: [facility]     FISSURECTOMY RECTUM N/A 2019    Procedure: EXAM UNDER ANESTHESIA,  "FISTULOTOMY;  Surgeon: Kiki Everett MD;  Location: [facility];  Service: General     INCISION AND DRAINAGE PERIRECTAL ABSCESS N/A 10/31/2019    Procedure: EXAMINE UNDER ANESTHESIA, INCISION AND DRAINAGE PERIRECTAL ABSCESS;  Surgeon: Kiki Everett MD;  Location: [facility];  Service: General     VT LAP, CELSO RESTRICT PROC, LONGITUDINAL GASTRECTOMY N/A 5/7/2019    Procedure: GASTRECTOMY, SLEEVE, LAPAROSCOPIC;  Surgeon: Austyn Louis MD;  Location: [facility];  Service: General     VT SURG DIAGNOSTIC EXAM, ANORECTAL N/A 7/2/2019    Procedure: RECTAL EXAM UNDER ANESTHESIA, Seton placement, anal fistula debridment,;  Surgeon: Amado Shepherd MD;  Location: [facility];  Service: General       Problem List     Patient Active Problem List   Diagnosis     Anal fistula     History of sleeve gastrectomy     Postoperative malabsorption     S/P bariatric surgery     ASCUS of cervix with negative high risk HPV     Cervical radiculitis     MACIEL (generalized anxiety disorder)     Hiatal hernia     IUD (intrauterine device) in place     Rectal fistula     Muscular deconditioning     Medications     [unfilled]  Surgical History     Past Surgical History  She has a past surgical history that includes Pr Lap, Celso Restrict Proc, Longitudinal Gastrectomy (N/A, 5/7/2019); Pr Surg Diagnostic Exam, Anorectal (N/A, 7/2/2019); Incision And Drainage Perirectal Abscess (N/A, 10/31/2019); Fissurectomy rectum (N/A, 12/27/2019); and Esophagoscopy, gastroscopy, duodenoscopy (EGD), combined (N/A, 1/26/2022).    Objective-Exam     Constitutional:  Ht 1.74 m (5' 8.5\")   Wt 90.5 kg (199 lb 9.6 oz)   BMI 29.91 kg/m    [unfilled]   General:  Pleasant and in no acute distress   Eyes:  EOMI  ENT:  Airway patent    Neck:  Respiratory: Normal respiratory effort, no cough..  CV:  n/a  Gastrointestinal: n/a  Musculoskeletal: muscle mass WNL  Skin: color without pallor hair straight/full., "   Psychiatric: alert and oriented X3, mood and affect normal    Counseling     We reviewed the important post op bariatric recommendations:  -eating 3 meals daily  -eating protein first, getting >60gm protein daily  -eating slowly, chewing food well  -avoiding/limiting calorie containing beverages  -drinking water 15-30 minutes before or after meals  -limiting restaurant or cafeteria eating to twice a week or less    We discussed the importance of restorative sleep and stress management in maintaining a healthy weight.  We discussed the National Weight Control Registry healthy weight maintenance strategies and ways to optimize metabolism.  We discussed the importance of physical activity including cardiovascular and strength training in maintaining a healthier weight.    We discussed the importance of life-long vitamin supplementation and life-long  follow-up.    Donna was reminded that, to avoid marginal ulcers she should avoid tobacco at all, alcohol in excess, caffeine in excess, and NSAIDS (unless indicated for cardioprotection or othewise and opposed by a PPI).    Alfa Branch MD    [facility].  1/3/2023  9:03 PM  [phone] (clinic phone)  [phone] (fax)    No images are attached to the encounter.  Medical Decision Makin minutes spent on the date of the encounter doing chart review, history and exam, documentation and further activities per the note    Donna Bolanos is 37 year old  female who presents for a billable video visit today.    How would you like to obtain your AVS? MyChart  If dropped from the video visit, the video invitation should be resent by: Text to cell phone: [phone]  Will anyone else be joining your video visit? No      Video Start Time: 3:00pm    Are there any specific questions or needs that you would like addressed at your visit today? Return MWM - med check    Provider Notes: see my note      Video-Visit Details    Type of service:  Video  Visit    Platform used for Video Visit: [employer]    Video End Time (time video stopped): 3:31 PM    Originating Location (pt. Location): Home        Distant Location (provider location):  Off-site    Distant Location (provider location):  [facility] [place]       Again, thank you for allowing me to participate in the care of your patient.        Sincerely,        Alfa Branch MD

## 2023-01-05 ENCOUNTER — TELEPHONE (OUTPATIENT)
Dept: SURGERY | Facility: CLINIC | Age: 38
End: 2023-01-05

## 2023-01-05 DIAGNOSIS — T50.905A WEIGHT GAIN DUE TO MEDICATION: Primary | ICD-10-CM

## 2023-01-05 DIAGNOSIS — M25.561 ARTHRALGIA OF BOTH KNEES: ICD-10-CM

## 2023-01-05 DIAGNOSIS — R63.5 WEIGHT GAIN DUE TO MEDICATION: Primary | ICD-10-CM

## 2023-01-05 DIAGNOSIS — M25.562 ARTHRALGIA OF BOTH KNEES: ICD-10-CM

## 2023-01-05 DIAGNOSIS — Z86.39 HISTORY OF MORBID OBESITY: ICD-10-CM

## 2023-01-05 RX ORDER — SEMAGLUTIDE 0.5 MG/.5ML
0.5 INJECTION, SOLUTION SUBCUTANEOUS
Qty: 2 ML | Refills: 0 | Status: SHIPPED | OUTPATIENT
Start: 2023-01-05 | End: 2023-02-02

## 2023-01-05 RX ORDER — SEMAGLUTIDE 0.25 MG/.5ML
0.25 INJECTION, SOLUTION SUBCUTANEOUS WEEKLY
Qty: 2 ML | Refills: 0 | Status: SHIPPED | OUTPATIENT
Start: 2023-01-05 | End: 2023-02-02

## 2023-01-05 RX ORDER — SEMAGLUTIDE 1 MG/.5ML
1 INJECTION, SOLUTION SUBCUTANEOUS
Qty: 2 ML | Refills: 0 | Status: SHIPPED | OUTPATIENT
Start: 2023-01-05 | End: 2023-02-02

## 2023-01-05 RX ORDER — SEMAGLUTIDE 2.4 MG/.75ML
2.4 INJECTION, SOLUTION SUBCUTANEOUS
Qty: 3 ML | Refills: 9 | Status: SHIPPED | OUTPATIENT
Start: 2023-01-05 | End: 2023-10-02

## 2023-01-05 RX ORDER — SEMAGLUTIDE 1.7 MG/.75ML
1.7 INJECTION, SOLUTION SUBCUTANEOUS
Qty: 3 ML | Refills: 0 | Status: SHIPPED | OUTPATIENT
Start: 2023-01-05 | End: 2023-02-02

## 2023-01-05 NOTE — TELEPHONE ENCOUNTER
PRIOR AUTHORIZATION DENIED    Medication: liraglutide - Weight Management (SAXENDA) 18 MG/3ML pen - EPA DENIAL    Denial Date: 1/5/2023    Denial Rational:       Appeal Information:

## 2023-01-05 NOTE — PROGRESS NOTES
Saxenda not covered. I'll see if we can get Wegovy covered instead given that she's had good maintenance of over 70 lb reduction in weight since her sleeve gastrectomy and intolerant to phentermine, ineffectiveness with naltrexone previously and multiple medications that would interact/contraindicate bupropion use. She's had some modest recent success with the left over Saxenda she'd had from earlier in the year but hasn't reached threshhold her insurance would cover ongoing use so shifting to Wegovy would be reasonalbe for improved compliance and possible better efficacy given drug trial results that demonstrate better average weight loss with semaglutide over liraglutide.   Alfa Branch MD  Wt Readings from Last 5 Encounters:   01/04/23 90.5 kg (199 lb 9.6 oz)   10/03/22 93.9 kg (207 lb)   04/06/22 90.1 kg (198 lb 9.6 oz)   02/10/22 88.9 kg (196 lb)   02/02/22 88.9 kg (196 lb)       Weight history graph above.  Alfa Branch MD

## 2023-04-16 ENCOUNTER — HEALTH MAINTENANCE LETTER (OUTPATIENT)
Age: 38
End: 2023-04-16

## 2024-06-23 ENCOUNTER — HEALTH MAINTENANCE LETTER (OUTPATIENT)
Age: 39
End: 2024-06-23

## 2025-03-29 ENCOUNTER — HEALTH MAINTENANCE LETTER (OUTPATIENT)
Age: 40
End: 2025-03-29

## 2025-07-12 ENCOUNTER — HEALTH MAINTENANCE LETTER (OUTPATIENT)
Age: 40
End: 2025-07-12